# Patient Record
Sex: FEMALE | Race: WHITE | NOT HISPANIC OR LATINO | Employment: FULL TIME | URBAN - NONMETROPOLITAN AREA
[De-identification: names, ages, dates, MRNs, and addresses within clinical notes are randomized per-mention and may not be internally consistent; named-entity substitution may affect disease eponyms.]

---

## 2018-05-15 ENCOUNTER — TRANSCRIBE ORDERS (OUTPATIENT)
Dept: ADMINISTRATIVE | Facility: HOSPITAL | Age: 63
End: 2018-05-15

## 2018-05-15 DIAGNOSIS — Z12.31 ENCOUNTER FOR SCREENING MAMMOGRAM FOR MALIGNANT NEOPLASM OF BREAST: Primary | ICD-10-CM

## 2018-07-16 ENCOUNTER — OFFICE VISIT (OUTPATIENT)
Dept: GASTROENTEROLOGY | Facility: CLINIC | Age: 63
End: 2018-07-16

## 2018-07-16 ENCOUNTER — PREP FOR SURGERY (OUTPATIENT)
Dept: OTHER | Facility: HOSPITAL | Age: 63
End: 2018-07-16

## 2018-07-16 ENCOUNTER — HOSPITAL ENCOUNTER (OUTPATIENT)
Dept: MAMMOGRAPHY | Facility: HOSPITAL | Age: 63
Discharge: HOME | End: 2018-07-16
Admitting: OBSTETRICS & GYNECOLOGY

## 2018-07-16 VITALS
DIASTOLIC BLOOD PRESSURE: 88 MMHG | TEMPERATURE: 97.7 F | SYSTOLIC BLOOD PRESSURE: 139 MMHG | WEIGHT: 148 LBS | RESPIRATION RATE: 16 BRPM | BODY MASS INDEX: 27.23 KG/M2 | HEART RATE: 59 BPM | HEIGHT: 62 IN

## 2018-07-16 DIAGNOSIS — R11.0 NAUSEA: Primary | ICD-10-CM

## 2018-07-16 DIAGNOSIS — R19.7 DIARRHEA, UNSPECIFIED TYPE: Chronic | ICD-10-CM

## 2018-07-16 DIAGNOSIS — R10.13 EPIGASTRIC PAIN: ICD-10-CM

## 2018-07-16 DIAGNOSIS — K62.5 BRIGHT RED BLOOD PER RECTUM: Primary | Chronic | ICD-10-CM

## 2018-07-16 DIAGNOSIS — R10.32 LEFT LOWER QUADRANT PAIN: ICD-10-CM

## 2018-07-16 DIAGNOSIS — R10.32 LEFT LOWER QUADRANT PAIN: Chronic | ICD-10-CM

## 2018-07-16 DIAGNOSIS — R13.10 DYSPHAGIA, UNSPECIFIED TYPE: ICD-10-CM

## 2018-07-16 DIAGNOSIS — K62.5 BRIGHT RED BLOOD PER RECTUM: ICD-10-CM

## 2018-07-16 DIAGNOSIS — R19.7 DIARRHEA, UNSPECIFIED TYPE: ICD-10-CM

## 2018-07-16 DIAGNOSIS — R13.10 DYSPHAGIA, UNSPECIFIED TYPE: Chronic | ICD-10-CM

## 2018-07-16 DIAGNOSIS — Z12.31 ENCOUNTER FOR SCREENING MAMMOGRAM FOR MALIGNANT NEOPLASM OF BREAST: ICD-10-CM

## 2018-07-16 DIAGNOSIS — R12 HEARTBURN: ICD-10-CM

## 2018-07-16 DIAGNOSIS — Z12.11 COLON CANCER SCREENING: ICD-10-CM

## 2018-07-16 DIAGNOSIS — Z12.11 COLON CANCER SCREENING: Primary | ICD-10-CM

## 2018-07-16 DIAGNOSIS — R12 HEARTBURN: Chronic | ICD-10-CM

## 2018-07-16 DIAGNOSIS — R10.13 EPIGASTRIC PAIN: Chronic | ICD-10-CM

## 2018-07-16 DIAGNOSIS — R11.0 NAUSEA: Chronic | ICD-10-CM

## 2018-07-16 PROCEDURE — 99244 OFF/OP CNSLTJ NEW/EST MOD 40: CPT | Performed by: NURSE PRACTITIONER

## 2018-07-16 PROCEDURE — 77063 BREAST TOMOSYNTHESIS BI: CPT

## 2018-07-16 PROCEDURE — 77067 SCR MAMMO BI INCL CAD: CPT

## 2018-07-16 RX ORDER — OMEPRAZOLE 40 MG/1
CAPSULE, DELAYED RELEASE ORAL
Qty: 30 CAPSULE | Refills: 5 | Status: SHIPPED | OUTPATIENT
Start: 2018-07-16 | End: 2020-01-14 | Stop reason: SDUPTHER

## 2018-07-16 RX ORDER — ONDANSETRON 4 MG/1
4 TABLET, ORALLY DISINTEGRATING ORAL EVERY 8 HOURS PRN
Qty: 30 TABLET | Refills: 1 | Status: SHIPPED | OUTPATIENT
Start: 2018-07-16 | End: 2018-08-15

## 2018-07-16 RX ORDER — METRONIDAZOLE 250 MG/1
250 TABLET ORAL 4 TIMES DAILY
Qty: 28 TABLET | Refills: 0 | Status: SHIPPED | OUTPATIENT
Start: 2018-07-16 | End: 2018-07-23

## 2018-07-16 RX ORDER — OMEPRAZOLE 20 MG/1
CAPSULE, DELAYED RELEASE ORAL
Refills: 12 | COMMUNITY
Start: 2018-06-08 | End: 2018-07-16 | Stop reason: DRUGHIGH

## 2018-07-16 RX ORDER — SODIUM CHLORIDE 9 MG/ML
70 INJECTION, SOLUTION INTRAVENOUS CONTINUOUS PRN
Status: CANCELLED | OUTPATIENT
Start: 2018-07-16

## 2018-07-16 RX ORDER — CIPROFLOXACIN 500 MG/1
500 TABLET, FILM COATED ORAL 2 TIMES DAILY
Qty: 14 TABLET | Refills: 0 | Status: SHIPPED | OUTPATIENT
Start: 2018-07-16 | End: 2018-07-23

## 2018-07-16 NOTE — PATIENT INSTRUCTIONS
1. Antireflux measures: Avoid fried, fatty foods, alcohol, chocolate, coffee, tea,  soft drinks, peppermint and spearmint, spicy foods, tomatoes and tomato based foods, onion based foods, and smoking. Other antireflux measures include weight reduction if overweight, avoiding tight clothing around the abdomen, elevating the head of the bed 6 inches with blocks under the head board, and don't drink or eat before going to bed and avoid lying down immediately after meals.  2. Omeprazole 40 mg 1 po daily in the am 30 minutes before breakfast.  3. Zofran 4 mg 1 po every 8 hours as needed for nausea.  4. Upper endoscopy-EGD: Description of the procedure, risks, benefits, alternatives and options, including nonoperative options, were discussed with the patient in detail. The patient understands and wishes to proceed.  5. Treatment for diverticulitis:  A. Low-fat low fiber diet for 5 days thereafter low-fat high-fiber diet.   B. Cipro (ciprofloxacin) tablets 500 mg. Take 1 tablet by mouth twice a day for 7 days. Side effects were discussed.   C. Flagyl (metronidazole) tablets 250 mg. Take 1 tablet one by mouth 4 times a day for 7 days. Side effects were discussed.  D. Avoid laxatives, enemas for next 5 days. However, for constipation the patient may use stool softeners.  E. Patient may take probiotics while taking antibiotics, and continue for an additional 1-2 weeks.  F. Colonoscopy: Description of the procedure, risks, benefits, alternatives and options, including nonoperative options, were discussed with the patient in detail. The patient understands and wishes to proceed, although it may be advisable to wait 3-4 weeks to schedule procedure.  6. The patient is to call in 1 week with update.

## 2018-07-16 NOTE — PROGRESS NOTES
Chief Complaint   Patient presents with   • Hematochezia     There is a history of bright red blood per rectum since October 2017. The patient has rectal bleeding daily. At times it is 1-2 tsp and at times it is a very scant amount. It seems to be worsening.     There is a history of loose stools since October 2017. The patient has 1-2 loose stools per day. Nothing worsens diarrhea and nothing improves diarrhea. The patient does not take anything for diarrhea. No history of constipation.     There is a history of epigastric pain since October 2017. The patient has the pain daily. The pain is a sharp pain and has been gradually worsening. The pain lasts 5-15 minutes. Eating makes the pain worse. Nothing improves the pain.     There is a long-standing history of heartburn. The patient has heartburn 3-4 days per week. Heartburn is moderate to severe. The patient takes Omeprazole with reasonable control of heartburn.     There is a long-standing history of difficulty swallowing. The patient may have difficulty swallowing 3-4 times per month. She has difficulty swallowing solids and soft foods, but no problems with liquids.     There is a long-standing history of nausea. The patient has nausea 5-6 days per week. Eating makes nausea worse. The patient is not taking anything for nausea. The patient denies vomiting. Of interest, the patient has been having chills for the past several months off and on, but no fevers.    The patient's last colonoscopy was in 2014. There is no family history of colon cancer.    Rectal Bleeding   This is a chronic problem. Episode onset: October 2017. The problem occurs daily. The problem has been gradually worsening. Associated symptoms include abdominal pain, arthralgias and coughing. Pertinent negatives include no chest pain, chills, fatigue, fever, headaches, joint swelling, myalgias, nausea, rash or vomiting. Exacerbated by: bowel movements. She has tried nothing for the symptoms.    Diarrhea    This is a chronic problem. Episode onset: October 2017. The problem occurs less than 2 times per day. The problem has been unchanged. Diarrhea characteristics: loose. The patient states that diarrhea does not awaken her from sleep. Associated symptoms include abdominal pain, arthralgias and coughing. Pertinent negatives include no chills, fever, headaches, myalgias or vomiting. Nothing aggravates the symptoms. There are no known risk factors. She has tried nothing for the symptoms.   Abdominal Pain   This is a chronic problem. Episode onset: October 2017. The onset quality is gradual. The problem occurs daily. The problem has been gradually worsening. The pain is located in the epigastric region. The quality of the pain is sharp. Associated symptoms include arthralgias, diarrhea and hematochezia. Pertinent negatives include no constipation, dysuria, fever, headaches, hematuria, myalgias, nausea or vomiting. Her past medical history is significant for GERD.   Heartburn   She complains of abdominal pain, coughing and heartburn. She reports no chest pain or no nausea. This is a chronic problem. Episode onset: over 5 years. The problem occurs frequently. The problem has been unchanged. The heartburn duration is an hour. The heartburn is of moderate intensity. The heartburn wakes her from sleep. Nothing aggravates the symptoms. Pertinent negatives include no fatigue. There are no known risk factors. She has tried a PPI for the symptoms. The treatment provided moderate relief.   Nausea   This is a chronic problem. Episode onset: October 2017. The problem occurs intermittently. The problem has been unchanged. Associated symptoms include abdominal pain, arthralgias and coughing. Pertinent negatives include no chest pain, chills, fatigue, fever, headaches, joint swelling, myalgias, nausea, rash or vomiting. The symptoms are aggravated by eating. She has tried nothing for the symptoms.     Review of Systems    Constitutional: Negative for appetite change, chills, fatigue, fever and unexpected weight change.   HENT: Negative for mouth sores, nosebleeds and trouble swallowing.    Eyes: Negative for discharge and redness.   Respiratory: Positive for cough. Negative for apnea and shortness of breath.    Cardiovascular: Negative for chest pain, palpitations and leg swelling.   Gastrointestinal: Positive for abdominal pain, blood in stool, diarrhea, heartburn and hematochezia. Negative for abdominal distention, anal bleeding, constipation, nausea and vomiting.   Endocrine: Negative for cold intolerance, heat intolerance and polydipsia.   Genitourinary: Negative for dysuria, hematuria and urgency.   Musculoskeletal: Positive for arthralgias. Negative for joint swelling and myalgias.   Skin: Negative for rash.   Allergic/Immunologic: Negative for food allergies and immunocompromised state.   Neurological: Negative for dizziness, seizures, syncope and headaches.   Hematological: Negative for adenopathy. Does not bruise/bleed easily.   Psychiatric/Behavioral: Negative for dysphoric mood. The patient is not nervous/anxious and is not hyperactive.      Patient Active Problem List   Diagnosis   • Bright red blood per rectum   • Left lower quadrant pain   • Diarrhea   • Nausea   • Dysphagia   • Heartburn   • Epigastric pain     Past Medical History:   Diagnosis Date   • Anxiety    • Colon polyp 01/16/2014   • Depression    • Hyperlipidemia    • Osteoarthritis      Past Surgical History:   Procedure Laterality Date   • BREAST BIOPSY     • COLONOSCOPY  01/16/2014   • TUBAL ABDOMINAL LIGATION  1989   • UPPER GASTROINTESTINAL ENDOSCOPY  03/13/2014     Family History   Problem Relation Age of Onset   • Lung cancer Mother    • Lung cancer Father    • Prostate cancer Brother         x 3 brothers     Social History   Substance Use Topics   • Smoking status: Former Smoker     Types: Cigarettes     Quit date: 7/16/2016   • Smokeless tobacco:  "Never Used   • Alcohol use Yes      Comment: social       Current Outpatient Prescriptions:   •  ciprofloxacin (CIPRO) 500 MG tablet, Take 1 tablet by mouth 2 (Two) Times a Day for 7 days. Take 1 tablet twice a day x 7 days, Disp: 14 tablet, Rfl: 0  •  metroNIDAZOLE (FLAGYL) 250 MG tablet, Take 1 tablet by mouth 4 (Four) Times a Day for 7 days. Take 1 tablet four times daily x 7 days, Disp: 28 tablet, Rfl: 0  •  omeprazole (PRILOSEC) 40 MG capsule, 1 po daily in the am 30 minutes before breakfast, Disp: 30 capsule, Rfl: 5  •  ondansetron ODT (ZOFRAN-ODT) 4 MG disintegrating tablet, Take 1 tablet by mouth Every 8 (Eight) Hours As Needed for Nausea or Vomiting for up to 30 days., Disp: 30 tablet, Rfl: 1  •  Probiotic capsule, Take 1 capsule by mouth Daily., Disp: 30 capsule, Rfl: 1    No Known Allergies    /88   Pulse 59   Temp 97.7 °F (36.5 °C)   Resp 16   Ht 157.5 cm (62\")   Wt 67.1 kg (148 lb)   LMP  (LMP Unknown)   BMI 27.07 kg/m²     Physical Exam   Constitutional: She is oriented to person, place, and time. She appears well-developed and well-nourished. No distress.   HENT:   Head: Normocephalic and atraumatic.   Right Ear: Hearing and external ear normal.   Left Ear: Hearing and external ear normal.   Nose: Nose normal.   Mouth/Throat: Oropharynx is clear and moist and mucous membranes are normal. Mucous membranes are not pale, not dry and not cyanotic. No oral lesions. No oropharyngeal exudate.   Eyes: Conjunctivae and EOM are normal. Right eye exhibits no discharge. Left eye exhibits no discharge.   Neck: Trachea normal. Neck supple. No JVD present. No edema present. No thyroid mass and no thyromegaly present.   Cardiovascular: Normal rate, regular rhythm, S2 normal and normal heart sounds.  Exam reveals no gallop, no S3 and no friction rub.    No murmur heard.  Pulmonary/Chest: Effort normal and breath sounds normal. No respiratory distress. She exhibits no tenderness.   Abdominal: Normal " appearance and bowel sounds are normal. She exhibits no distension, no ascites and no mass. There is no splenomegaly or hepatomegaly. There is tenderness (mild to moderate) in the epigastric area and left lower quadrant. There is no rigidity, no rebound and no guarding. No hernia.     Vascular Status -  Her right foot exhibits no edema. Her left foot exhibits no edema.  Lymphadenopathy:     She has no cervical adenopathy.        Left: No supraclavicular adenopathy present.   Neurological: She is alert and oriented to person, place, and time. She has normal strength. No cranial nerve deficit or sensory deficit.   Skin: No rash noted. She is not diaphoretic. No cyanosis. No pallor. Nails show no clubbing.   Psychiatric: She has a normal mood and affect.   Nursing note and vitals reviewed.  Stigmata of chronic liver disease:  None.  Asterixis:  None.    Procedures:  Upon review of records:    Colonoscopy dated 1/16/2014 reveals left-sided scattered diverticulosis.  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  Colon polyps.  Internal hemorrhoids.  Hypertrophic anal papilla.  Biopsy results not available.    EGD dated 3/13/2014 reveals linear erythema within the midesophagus which may represent pill esophagitis.  Erythematous distal esophagitis, grade 1.  Small sliding hiatal hernia less than 3 centimeter.  Erythematous gastritis.  No Castlelanos's mucosa was seen.  Subtle concentric rings were seen in the mid esophagus.  Duodenum second portion biopsy reveals benign small bowel mucosa with no diagnostic abnormality.  Antrum and body biopsy reveals chronic gastritis with lymphoid aggregate.  Negative for H. pylori.  Mid esophagus biopsy reveals reactive squamous mucosa.    Assessment:      ICD-10-CM ICD-9-CM   1. Bright red blood per rectum K62.5 569.3   2. Left lower quadrant pain R10.32 789.04   3. Diarrhea, unspecified type R19.7 787.91   4. Nausea R11.0 787.02   5. Dysphagia, unspecified type R13.10  787.20   6. Heartburn R12 787.1   7. Epigastric pain R10.13 789.06   8. Colon cancer screening Z12.11 V76.51     Discussion:  1. Long-standing history of bright red blood per rectum.  Differentials include diverticulitis, hemorrhoids, fissure, vascular ectasia, underlying inflammatory bowel disease.  2. Left lower quadrant pain consistent with diverticulitis.  3. Long-standing history of loose stools.  Differentials include diverticulitis, microscopic colitis, underlying inflammatory bowel disease.  4. Long-standing history of nausea and epigastric pain.  Differentials include peptic ulcer disease, pancreatobiliary disease.  5. History of recurrent difficulty swallowing.  Differentials include Schatzki's ring, esophagitis, esophageal dysmotility.  6. Long-standing history of heartburn.  Moderate control with omeprazole.  Concerns for underlying Castellanos's.    Plan/  Patient Instructions   1. Antireflux measures: Avoid fried, fatty foods, alcohol, chocolate, coffee, tea,  soft drinks, peppermint and spearmint, spicy foods, tomatoes and tomato based foods, onion based foods, and smoking. Other antireflux measures include weight reduction if overweight, avoiding tight clothing around the abdomen, elevating the head of the bed 6 inches with blocks under the head board, and don't drink or eat before going to bed and avoid lying down immediately after meals.  2. Omeprazole 40 mg 1 po daily in the am 30 minutes before breakfast.  3. Zofran 4 mg 1 po every 8 hours as needed for nausea.  4. Upper endoscopy-EGD: Description of the procedure, risks, benefits, alternatives and options, including nonoperative options, were discussed with the patient in detail. The patient understands and wishes to proceed.  5. Treatment for diverticulitis:  A. Low-fat low fiber diet for 5 days thereafter low-fat high-fiber diet.   B. Cipro (ciprofloxacin) tablets 500 mg. Take 1 tablet by mouth twice a day for 7 days. Side effects were discussed.    C. Flagyl (metronidazole) tablets 250 mg. Take 1 tablet one by mouth 4 times a day for 7 days. Side effects were discussed.  D. Avoid laxatives, enemas for next 5 days. However, for constipation the patient may use stool softeners.  E. Patient may take probiotics while taking antibiotics, and continue for an additional 1-2 weeks.  F. Colonoscopy: Description of the procedure, risks, benefits, alternatives and options, including nonoperative options, were discussed with the patient in detail. The patient understands and wishes to proceed, although it may be advisable to wait 3-4 weeks to schedule procedure.  6. The patient is to call in 1 week with update.     Lakia Keen, APRN

## 2018-07-17 PROBLEM — Z12.11 COLON CANCER SCREENING: Status: ACTIVE | Noted: 2018-07-17

## 2019-05-23 DIAGNOSIS — R12 HEARTBURN: Chronic | ICD-10-CM

## 2019-05-23 DIAGNOSIS — R10.13 EPIGASTRIC PAIN: Chronic | ICD-10-CM

## 2019-05-23 RX ORDER — OMEPRAZOLE 40 MG/1
CAPSULE, DELAYED RELEASE ORAL
Qty: 30 CAPSULE | Refills: 4 | OUTPATIENT
Start: 2019-05-23

## 2019-09-26 DIAGNOSIS — R12 HEARTBURN: Chronic | ICD-10-CM

## 2019-09-26 DIAGNOSIS — R10.13 EPIGASTRIC PAIN: Chronic | ICD-10-CM

## 2019-09-26 RX ORDER — OMEPRAZOLE 40 MG/1
CAPSULE, DELAYED RELEASE ORAL
Qty: 30 CAPSULE | Refills: 4 | OUTPATIENT
Start: 2019-09-26

## 2020-01-14 ENCOUNTER — OFFICE VISIT (OUTPATIENT)
Dept: INTERNAL MEDICINE | Facility: CLINIC | Age: 65
End: 2020-01-14

## 2020-01-14 VITALS
HEART RATE: 75 BPM | WEIGHT: 165 LBS | OXYGEN SATURATION: 97 % | HEIGHT: 62 IN | BODY MASS INDEX: 30.36 KG/M2 | TEMPERATURE: 97.6 F | RESPIRATION RATE: 16 BRPM | SYSTOLIC BLOOD PRESSURE: 118 MMHG | DIASTOLIC BLOOD PRESSURE: 84 MMHG

## 2020-01-14 DIAGNOSIS — F41.9 ANXIETY: ICD-10-CM

## 2020-01-14 DIAGNOSIS — Z12.11 COLON CANCER SCREENING: ICD-10-CM

## 2020-01-14 DIAGNOSIS — M19.90 ARTHRITIS: ICD-10-CM

## 2020-01-14 DIAGNOSIS — E78.5 HYPERLIPIDEMIA, UNSPECIFIED HYPERLIPIDEMIA TYPE: ICD-10-CM

## 2020-01-14 DIAGNOSIS — R11.0 NAUSEA: ICD-10-CM

## 2020-01-14 DIAGNOSIS — R10.32 LEFT LOWER QUADRANT PAIN: ICD-10-CM

## 2020-01-14 DIAGNOSIS — F32.A DEPRESSION, UNSPECIFIED DEPRESSION TYPE: ICD-10-CM

## 2020-01-14 DIAGNOSIS — R19.7 DIARRHEA, UNSPECIFIED TYPE: ICD-10-CM

## 2020-01-14 DIAGNOSIS — E55.9 VITAMIN D DEFICIENCY, UNSPECIFIED: ICD-10-CM

## 2020-01-14 DIAGNOSIS — Z83.2 FAMILY HISTORY OF ANEMIA: ICD-10-CM

## 2020-01-14 DIAGNOSIS — K62.5 BRIGHT RED BLOOD PER RECTUM: Primary | ICD-10-CM

## 2020-01-14 DIAGNOSIS — R10.13 EPIGASTRIC PAIN: Chronic | ICD-10-CM

## 2020-01-14 DIAGNOSIS — R12 HEARTBURN: ICD-10-CM

## 2020-01-14 DIAGNOSIS — R32 URINARY INCONTINENCE, UNSPECIFIED TYPE: ICD-10-CM

## 2020-01-14 PROBLEM — R13.10 DYSPHAGIA: Status: RESOLVED | Noted: 2018-07-16 | Resolved: 2020-01-14

## 2020-01-14 PROCEDURE — 99214 OFFICE O/P EST MOD 30 MIN: CPT | Performed by: INTERNAL MEDICINE

## 2020-01-14 RX ORDER — OMEPRAZOLE 40 MG/1
CAPSULE, DELAYED RELEASE ORAL
Qty: 30 CAPSULE | Refills: 5 | Status: SHIPPED | OUTPATIENT
Start: 2020-01-14 | End: 2020-03-16 | Stop reason: DRUGHIGH

## 2020-01-14 NOTE — PROGRESS NOTES
Subjective   Krystina Henley is a 64 y.o. female.     Chief Complaint   Patient presents with   • Hiatal Hernia     pt states she had a colonoscopy 5 years ago by Dr. Abdi, pt states she had a lot of rectal bleeding and still does have rectal bleeding. Dr. Abdi also found her hiatal hernia.     • Establish Care       History of Present Illness   Patient here for follow-up.  Patient complains lot of GI symptoms including nausea heartburn epigastric area discomfort diarrhea bright red blood per rectum.  Patient had a history of upper endoscopy many years ago diagnosed a hiatal hernia.  Patient states eating make it worse lying down make it worse medication does not help a great deal.  Patient also has some anxiety depression stable now.  Hyperlipidemia history and anemia history.  Patient also complains urinary incontinence.  Patient also has a history of a low vitamin D.      Current Outpatient Medications:   •  omeprazole (PrilOSEC) 40 MG capsule, 1 po daily in the am 30 minutes before breakfast, Disp: 30 capsule, Rfl: 5  •  Probiotic capsule, Take 1 capsule by mouth Daily., Disp: 30 capsule, Rfl: 1    The following portions of the patient's history were reviewed and updated as appropriate: allergies, current medications, past family history, past medical history, past social history, past surgical history and problem list.    Review of Systems   Constitutional: Negative.    Respiratory: Negative.    Cardiovascular: Negative.    Gastrointestinal: Positive for abdominal pain.   Musculoskeletal: Negative.    Skin: Negative.    Neurological: Negative.    Psychiatric/Behavioral: Negative.        Objective   Physical Exam   Constitutional: She is oriented to person, place, and time. She appears well-developed and well-nourished.   HENT:   Head: Normocephalic and atraumatic.   Neck: Neck supple.   Cardiovascular: Normal rate, regular rhythm and normal heart sounds.   Pulmonary/Chest: Effort normal and breath sounds  normal.   Abdominal: Bowel sounds are normal.   Neurological: She is alert and oriented to person, place, and time.   Skin: Skin is warm.   Psychiatric: She has a normal mood and affect.       All tests have been reviewed.    Assessment/Plan   Diagnoses and all orders for this visit:    Bright red blood per rectum  -     Ambulatory Referral to Gastroenterology    Diarrhea, unspecified type  -     Ambulatory Referral to Gastroenterology    Nausea  -     Ambulatory Referral to Gastroenterology    Heartburn  -     Ambulatory Referral to Gastroenterology  -     omeprazole (PrilOSEC) 40 MG capsule; 1 po daily in the am 30 minutes before breakfast    Epigastric pain  -     Ambulatory Referral to Gastroenterology  -     CBC & Differential  -     Comprehensive Metabolic Panel  -     Lipid Panel  -     TSH  -     Vitamin D 25 Hydroxy  -     omeprazole (PrilOSEC) 40 MG capsule; 1 po daily in the am 30 minutes before breakfast      Colon cancer screening  -     Ambulatory Referral to Gastroenterology    Arthritis stable now    Anxiety continue to watch    Depression, no suicidal thoughts continue to watch.  Stable now    Hyperlipidemia, unspecified hyperlipidemia type 2 blood tests  -     CBC & Differential  -     Comprehensive Metabolic Panel  -     Lipid Panel  -     TSH  -     Vitamin D 25 Hydroxy    history of anemia due to blood tests    Urinary incontinence, unspecified type  -     Ambulatory Referral to Urology    Vitamin D deficiency, unspecified   -     Vitamin D 25 Hydroxy      3 weeks after labs

## 2020-01-30 LAB
25(OH)D3+25(OH)D2 SERPL-MCNC: 32.7 NG/ML (ref 30–100)
ALBUMIN SERPL-MCNC: 4 G/DL (ref 3.5–5.2)
ALBUMIN/GLOB SERPL: 1.5 G/DL
ALP SERPL-CCNC: 85 U/L (ref 39–117)
ALT SERPL-CCNC: 20 U/L (ref 1–33)
AST SERPL-CCNC: 24 U/L (ref 1–32)
BASOPHILS # BLD AUTO: 0.05 10*3/MM3 (ref 0–0.2)
BASOPHILS NFR BLD AUTO: 1 % (ref 0–1.5)
BILIRUB SERPL-MCNC: <0.2 MG/DL (ref 0.2–1.2)
BUN SERPL-MCNC: 18 MG/DL (ref 8–23)
BUN/CREAT SERPL: 19.6 (ref 7–25)
CALCIUM SERPL-MCNC: 8.7 MG/DL (ref 8.6–10.5)
CHLORIDE SERPL-SCNC: 105 MMOL/L (ref 98–107)
CHOLEST SERPL-MCNC: 294 MG/DL (ref 0–200)
CO2 SERPL-SCNC: 26.9 MMOL/L (ref 22–29)
CREAT SERPL-MCNC: 0.92 MG/DL (ref 0.57–1)
EOSINOPHIL # BLD AUTO: 0.29 10*3/MM3 (ref 0–0.4)
EOSINOPHIL NFR BLD AUTO: 5.7 % (ref 0.3–6.2)
ERYTHROCYTE [DISTWIDTH] IN BLOOD BY AUTOMATED COUNT: 13.4 % (ref 12.3–15.4)
GLOBULIN SER CALC-MCNC: 2.6 GM/DL
GLUCOSE SERPL-MCNC: 85 MG/DL (ref 65–99)
HCT VFR BLD AUTO: 36.4 % (ref 34–46.6)
HDLC SERPL-MCNC: 71 MG/DL (ref 40–60)
HGB BLD-MCNC: 12 G/DL (ref 12–15.9)
IMM GRANULOCYTES # BLD AUTO: 0.01 10*3/MM3 (ref 0–0.05)
IMM GRANULOCYTES NFR BLD AUTO: 0.2 % (ref 0–0.5)
LDLC SERPL CALC-MCNC: 199 MG/DL (ref 0–100)
LYMPHOCYTES # BLD AUTO: 2.15 10*3/MM3 (ref 0.7–3.1)
LYMPHOCYTES NFR BLD AUTO: 42.3 % (ref 19.6–45.3)
MCH RBC QN AUTO: 28.9 PG (ref 26.6–33)
MCHC RBC AUTO-ENTMCNC: 33 G/DL (ref 31.5–35.7)
MCV RBC AUTO: 87.7 FL (ref 79–97)
MONOCYTES # BLD AUTO: 0.51 10*3/MM3 (ref 0.1–0.9)
MONOCYTES NFR BLD AUTO: 10 % (ref 5–12)
NEUTROPHILS # BLD AUTO: 2.07 10*3/MM3 (ref 1.7–7)
NEUTROPHILS NFR BLD AUTO: 40.8 % (ref 42.7–76)
NRBC BLD AUTO-RTO: 0 /100 WBC (ref 0–0.2)
PLATELET # BLD AUTO: 292 10*3/MM3 (ref 140–450)
POTASSIUM SERPL-SCNC: 4.8 MMOL/L (ref 3.5–5.2)
PROT SERPL-MCNC: 6.6 G/DL (ref 6–8.5)
RBC # BLD AUTO: 4.15 10*6/MM3 (ref 3.77–5.28)
SODIUM SERPL-SCNC: 144 MMOL/L (ref 136–145)
TRIGL SERPL-MCNC: 122 MG/DL (ref 0–150)
TSH SERPL DL<=0.005 MIU/L-ACNC: 4.66 UIU/ML (ref 0.27–4.2)
VLDLC SERPL CALC-MCNC: 24.4 MG/DL
WBC # BLD AUTO: 5.08 10*3/MM3 (ref 3.4–10.8)

## 2020-02-03 ENCOUNTER — OFFICE VISIT (OUTPATIENT)
Dept: UROLOGY | Facility: CLINIC | Age: 65
End: 2020-02-03

## 2020-02-03 VITALS — HEART RATE: 68 BPM | HEIGHT: 62 IN | BODY MASS INDEX: 30.36 KG/M2 | OXYGEN SATURATION: 99 % | WEIGHT: 165 LBS

## 2020-02-03 DIAGNOSIS — R32 URINARY INCONTINENCE, UNSPECIFIED TYPE: Primary | ICD-10-CM

## 2020-02-03 LAB
BILIRUB BLD-MCNC: NEGATIVE MG/DL
CLARITY, POC: CLEAR
COLOR UR: YELLOW
GLUCOSE UR STRIP-MCNC: NEGATIVE MG/DL
KETONES UR QL: NEGATIVE
LEUKOCYTE EST, POC: NEGATIVE
NITRITE UR-MCNC: NEGATIVE MG/ML
PH UR: 7.5 [PH] (ref 5–8)
PROT UR STRIP-MCNC: NEGATIVE MG/DL
RBC # UR STRIP: NEGATIVE /UL
SP GR UR: 1.01 (ref 1–1.03)
UROBILINOGEN UR QL: NORMAL

## 2020-02-03 PROCEDURE — 81003 URINALYSIS AUTO W/O SCOPE: CPT | Performed by: UROLOGY

## 2020-02-03 PROCEDURE — 99204 OFFICE O/P NEW MOD 45 MIN: CPT | Performed by: UROLOGY

## 2020-02-03 NOTE — PROGRESS NOTES
Chief Complaint  Mixed urinary incontinence    Referring Provider  Tre Lindquist MD    HPI  Ms. Henley is a 64 y.o. female presents with complaint of urinary incontinence for 3-4 years.     Pt has primarily urge  mixed urinary incontinence.     Severity: Pt uses 4-5 pads a day and has tried oxybutynin, Detrol, pelvic floor physical therapy, lifestyle changes and weight loss in the past  Associated Sx: Pt has + h/o daytime frequency, urgency and nocturia.     No h/o poor stream, straining, hesitancy or incomplete voiding.     No h/o recurrent UTI, hematuria, nephrolithiasis    No vaginal discharge or bleeding.  No h/o something coming out of vagina   No constipation  3 vaginal deliveries    Past Medical History  Past Medical History:   Diagnosis Date   • Anxiety    • Colon polyp 01/16/2014   • Depression    • Diverticulosis    • Hyperlipidemia    • Osteoarthritis        Past Surgical History  Past Surgical History:   Procedure Laterality Date   • BREAST BIOPSY     • COLONOSCOPY  01/16/2014   • UPPER GASTROINTESTINAL ENDOSCOPY  03/13/2014       Medications  Current Outpatient Medications   Medication Sig Dispense Refill   • omeprazole (PrilOSEC) 40 MG capsule 1 po daily in the am 30 minutes before breakfast 30 capsule 5   • Probiotic capsule Take 1 capsule by mouth Daily. 30 capsule 1     No current facility-administered medications for this visit.        Allergies  No Known Allergies    Social History  Social History     Socioeconomic History   • Marital status:      Spouse name: Not on file   • Number of children: Not on file   • Years of education: Not on file   • Highest education level: Not on file   Tobacco Use   • Smoking status: Former Smoker     Types: Cigarettes     Last attempt to quit: 7/16/2016     Years since quitting: 3.5   • Smokeless tobacco: Never Used   Substance and Sexual Activity   • Alcohol use: Yes     Comment: social   • Drug use: No   • Sexual activity: Defer       Family  "History  Family History   Problem Relation Age of Onset   • Lung cancer Mother    • Lung cancer Father    • Prostate cancer Brother         x 3 brothers   • Heart attack Brother         x 2 brothers        Review of Systems  Constitutional: No fevers or chills  Skin: Negative for rash  Endocrine: No heat/cold intolerance   Cardiovascular: Negative for chest pain or dyspnea on exertion  Respiratory: Negative for shortness of breath or wheezing  Gastrointestinal: No nausea or vomiting  Genitourinary: Negative for current gross hematuria.  Musculoskeletal: No flank pain  Neurological:  Negative for frequent headaches or dizziness  Lymph/Heme: Negative for leg swelling or calf pain.    Physical Exam  Visit Vitals  Pulse 68   Ht 157.5 cm (62\")   Wt 74.8 kg (165 lb)   LMP  (LMP Unknown)   SpO2 99%   BMI 30.18 kg/m²     Constitutional: NAD, WDWN  HEENT: NCAT. Conjunctivae normal  MMM  Cardiovascular: Regular rate  Pulmonary/Chest: Respirations are even and non-labored bilaterally  Abdominal: Soft with no distension, tenderness, masses, guarding or CVA tenderness  Neurological: A + O x 3,  cranial nerves II-XII grossly intact  Extremities: LIBBY x 4, warm, no clubbing, no cyanosis  Skin: Pink, warm, dry with no rash  Psychiatric:  Normal mood and affect    Pelvic exam: Fairly healthy vaginal estrogenization, severe stress urinary incontinence; grade 2-3 cystocele, grade 1-2 rectocele, no apical prolapse    Labs  Brief Urine Lab Results  (Last result in the past 365 days)      Color   Clarity   Blood   Leuk Est   Nitrite   Protein   CREAT   Urine HCG        02/03/20 1021 Yellow Clear Negative Negative Negative Negative               Lab Results   Component Value Date    CALCIUM 8.7 01/30/2020     01/30/2020    K 4.8 01/30/2020    CO2 26.9 01/30/2020     01/30/2020    BUN 18 01/30/2020    CREATININE 0.92 01/30/2020    EGFRIFAFRI 74 01/30/2020    EGFRIFNONA 61 01/30/2020    BCR 19.6 01/30/2020       Lab Results "   Component Value Date    WBC 5.08 01/30/2020    HGB 12.0 01/30/2020    HCT 36.4 01/30/2020    MCV 87.7 01/30/2020     01/30/2020       PVR   Post-void residual performed by staff - 0    I have personally reviewed her labs and post void residual imaging.     Assessment  Ms. Henley is a 64 y.o. female with mixed urinary incontinence, stress predominant, though the urge component is still very bothersome.  She had a significant amount of stress incontinence on exam today, as well as grade 2-3 anterior prolapse and 1-2 rectocele.  [place] was still well suspended.    We discussed the AUA guidelines for urge urinary incontinence.  We discussed lifestyle changes such as weight reduction and caffeine reduction, as well as medications such as anticholinergics and beta agonist.  We discussed third line therapies, such as Botox, InterStim, and PTNS.  The patient is interested in 3rd line therapies.  We discussed the risks, benefits, and alternatives to this approach.  She voiced her understanding and wished to proceed.    We discussed the AUA guidelines for management of stress urinary incontinence.  We discussed served of management with lifestyle changes like weight reduction and pelvic floor physical therapy.  We discussed surgical management with either mid urethral sling versus cystoscopy with bulking agent injection.  The patient is very interested in mid urethral sling.  We discussed the risks, benefits, and alternatives.  The patient voiced her understanding and wished to proceed.    I will have her see Dr. Estes and get his opinion on her degree of prolapse.  If he agrees that is overall minimal, we will proceed with mid urethral sling, plus or minus anterior repair.  If he feels that she is at risk or has significant apical prolapse, we will refer her to someone who can repair that, and she would get the sling along with that at that time.  We will defer treating her urge incontinence for now, because in  my experience 30% of women have great reduction or resolution of the urge incontinence after there severe stress incontinence is treated.    Plan  1.  FU in 2-3 weeks

## 2020-02-05 ENCOUNTER — OFFICE VISIT (OUTPATIENT)
Dept: GASTROENTEROLOGY | Facility: CLINIC | Age: 65
End: 2020-02-05

## 2020-02-05 VITALS
BODY MASS INDEX: 31.1 KG/M2 | RESPIRATION RATE: 16 BRPM | TEMPERATURE: 96.8 F | SYSTOLIC BLOOD PRESSURE: 123 MMHG | HEART RATE: 72 BPM | HEIGHT: 62 IN | WEIGHT: 169 LBS | DIASTOLIC BLOOD PRESSURE: 82 MMHG

## 2020-02-05 DIAGNOSIS — R13.10 DYSPHAGIA, UNSPECIFIED TYPE: ICD-10-CM

## 2020-02-05 DIAGNOSIS — K62.5 BRBPR (BRIGHT RED BLOOD PER RECTUM): ICD-10-CM

## 2020-02-05 DIAGNOSIS — R10.13 EPIGASTRIC PAIN: Primary | ICD-10-CM

## 2020-02-05 DIAGNOSIS — R19.7 DIARRHEA, UNSPECIFIED TYPE: ICD-10-CM

## 2020-02-05 DIAGNOSIS — R12 HEARTBURN: ICD-10-CM

## 2020-02-05 PROCEDURE — 99214 OFFICE O/P EST MOD 30 MIN: CPT | Performed by: INTERNAL MEDICINE

## 2020-02-05 NOTE — H&P (VIEW-ONLY)
Chief Complaint   Patient presents with   • Abdominal Pain     History of Present Illness     There is history of epigastric abdominal pain off and on for the last 6 months.  The pain is gradual in onset, moderate in severity and aching in character.  Frequency being 2-3 times a week.  The pain may last for several minutes.  There is no radiation of abdominal pain.  Eating worsens the abdominal pain.  No definite relieving factors of abdominal pain.  The patient has history of reflux which is getting  worse especially when she bends over.  The patient has significant regurgitation. For the last several months the patient feels some solid food dysphagia.  The patient points to words mid to lower retrosternal area.  Frequency being several times a week.  The patient denies associated weight loss. The patient has history of diarrhea off-and-on for the last months months.  Severity is moderate, frequency of bowel movements being 3-5 times a day.  The stools are described as loose and at times watery.  Occasionally, there is a nocturnal element of diarrhea. The diarrhea is associated with tenesmus at times.  There is history of bright red blood per rectum off and on for the last several months.  Frequency being several times a month.  There is no associated dizziness.  She denies anorectal pain.  There is no history of liver or pancreatic disease.    There is no family history of colon cancer, inflammatory bowel disease, celiac disease or chronic liver disease.    Review of Systems   Constitutional: Positive for fatigue. Negative for appetite change, chills, fever and unexpected weight change.   HENT: Positive for hearing loss and trouble swallowing. Negative for mouth sores and nosebleeds.    Eyes: Negative for discharge and redness.   Respiratory: Negative for apnea, cough and shortness of breath.    Cardiovascular: Negative for chest pain, palpitations and leg swelling.   Gastrointestinal: Positive for blood in stool,  diarrhea, nausea and vomiting. Negative for abdominal distention, abdominal pain, anal bleeding and constipation.   Endocrine: Negative for cold intolerance, heat intolerance and polydipsia.   Genitourinary: Negative for dysuria, hematuria and urgency.   Musculoskeletal: Positive for arthralgias and back pain. Negative for joint swelling and myalgias.   Skin: Negative for rash.   Allergic/Immunologic: Positive for environmental allergies. Negative for food allergies and immunocompromised state.   Neurological: Negative for dizziness, seizures, syncope and headaches.   Hematological: Negative for adenopathy. Does not bruise/bleed easily.   Psychiatric/Behavioral: Negative for dysphoric mood. The patient is nervous/anxious. The patient is not hyperactive.      Patient Active Problem List   Diagnosis   • Bright red blood per rectum   • Diarrhea   • Nausea   • Heartburn   • Epigastric pain   • Colon cancer screening   • Arthritis   • Anxiety   • Depression   • Hyperlipidemia   • Family history of anemia   • Urinary incontinence   • Vitamin D deficiency, unspecified      Past Medical History:   Diagnosis Date   • Anxiety    • Colon polyp 01/16/2014   • Depression    • Diverticulosis    • Hyperlipidemia    • Osteoarthritis      Past Surgical History:   Procedure Laterality Date   • BREAST BIOPSY     • COLONOSCOPY  01/16/2014   • UPPER GASTROINTESTINAL ENDOSCOPY  03/13/2014     Family History   Problem Relation Age of Onset   • Lung cancer Mother    • Lung cancer Father    • Prostate cancer Brother         x 3 brothers   • Heart attack Brother         x 2 brothers    • Colon cancer Neg Hx    • Cirrhosis Neg Hx    • Liver disease Neg Hx    • Liver cancer Neg Hx    • Crohn's disease Neg Hx      Social History     Tobacco Use   • Smoking status: Former Smoker     Types: Cigarettes     Last attempt to quit: 7/16/2016     Years since quitting: 3.5   • Smokeless tobacco: Never Used   Substance Use Topics   • Alcohol use: Yes      "Comment: social       Current Outpatient Medications:   •  omeprazole (PrilOSEC) 40 MG capsule, 1 po daily in the am 30 minutes before breakfast, Disp: 30 capsule, Rfl: 5    No Known Allergies    Blood pressure 123/82, pulse 72, temperature 96.8 °F (36 °C), resp. rate 16, height 157.5 cm (62\"), weight 76.7 kg (169 lb).    Physical Exam   Constitutional: She is oriented to person, place, and time. She appears well-developed and well-nourished. No distress.   HENT:   Head: Normocephalic and atraumatic.   Right Ear: Hearing and external ear normal.   Left Ear: Hearing and external ear normal.   Nose: Nose normal.   Mouth/Throat: Oropharynx is clear and moist and mucous membranes are normal. Mucous membranes are not pale, not dry and not cyanotic. No oral lesions. No oropharyngeal exudate.   Eyes: Conjunctivae and EOM are normal. Right eye exhibits no discharge. Left eye exhibits no discharge. No scleral icterus.   Neck: Trachea normal. Neck supple. No JVD present. No edema present. No thyroid mass and no thyromegaly present.   Cardiovascular: Normal rate, regular rhythm, S2 normal and normal heart sounds. Exam reveals no gallop, no S3 and no friction rub.   No murmur heard.  Pulmonary/Chest: Effort normal and breath sounds normal. No respiratory distress. She has no wheezes. She has no rales. She exhibits no tenderness.   Abdominal: Soft. Normal appearance and bowel sounds are normal. She exhibits no distension, no ascites and no mass. There is no splenomegaly or hepatomegaly. There is tenderness in the epigastric area. There is no rigidity, no rebound and no guarding. No hernia.   Musculoskeletal: She exhibits no tenderness or deformity.     Vascular Status -  Her right foot exhibits no edema. Her left foot exhibits no edema.  Lymphadenopathy:     She has no cervical adenopathy.        Left: No supraclavicular adenopathy present.   Neurological: She is alert and oriented to person, place, and time. She has normal " strength. No cranial nerve deficit or sensory deficit. She exhibits normal muscle tone. Coordination normal.   Skin: No rash noted. She is not diaphoretic. No cyanosis. No pallor. Nails show no clubbing.   Psychiatric: She has a normal mood and affect. Her behavior is normal. Judgment and thought content normal.   Nursing note and vitals reviewed.  Stigmata of chronic liver disease:  None.  Asterixis:  None.    Laboratory Testing:  Upon review of medical records:    Dated January 30, 2020 sodium 144 potassium 4.8 chloride 105 CO2 26.9 BUN 18 serum creatinine 0.92 glucose 85.  Calcium 8.7.  Total protein 6.6.  Albumin 4.00.  T bili <0.2 AST 24 ALT 20 alkaline phosphatase 85.  Total cholesterol 294.  Triglycerides 122.  TSH 4.660.  Vitamin D 25 hydroxy level 32.7.  WBC 5.08 hemoglobin 12.0 hematocrit 36.4 MCV 87.7 platelet count 292.    Procedures:  Upon review of medical records:     Colonoscopy dated 1/16/2014 reveals left-sided scattered diverticulosis.  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  Colon polyps.  Internal hemorrhoids.  Hypertrophic anal papilla.  Biopsy results not available.     EGD dated 3/13/2014 reveals linear erythema within the midesophagus which may represent pill esophagitis. Erythematous distal esophagitis, grade 1.  Small sliding hiatal hernia less than 3 centimeter.  Erythematous gastritis.  No Castellanos's mucosa was seen.  Subtle concentric rings were seen in the mid esophagus.  Duodenum second portion biopsy reveals benign small bowel mucosa with no diagnostic abnormality.  Antrum and body biopsy reveals chronic gastritis with lymphoid aggregate.  Negative for H. pylori.  Mid esophagus biopsy reveals reactive squamous mucosa.     Assessment:      ICD-10-CM ICD-9-CM   1. Epigastric pain R10.13 789.06   2. Heartburn R12 787.1   3. Dysphagia, unspecified type R13.10 787.20   4. Diarrhea, unspecified type R19.7 787.91   5. BRBPR (bright red blood per rectum) K62.5 569.3        Discussion:  1.     Plan/  Patient Instructions   1. Antireflux measures.  2. Prilosec(Omeprazole) DR capsule 40 mg. Take one capsule in the morning half an hour before eating daily.  3. Low-fat-low lactose diet.  4. Upper endoscopy (EGD) counseling:  Description of the procedure, risks, benefits, alternatives and options including nonoperative options were discussed with the patient in detail.  The patient understands and wishes to proceed.  5. The patient will need a colonoscopy in the future.       Marito Abdi MD

## 2020-02-05 NOTE — PATIENT INSTRUCTIONS
1. Antireflux measures.  2. Prilosec(Omeprazole) DR capsule 40 mg. Take one capsule in the morning half an hour before eating daily.  3. Low-fat-low lactose diet.  4. Upper endoscopy (EGD) counseling:  Description of the procedure, risks, benefits, alternatives and options including nonoperative options were discussed with the patient in detail.  The patient understands and wishes to proceed.  5. The patient will need a colonoscopy in the future.

## 2020-02-06 ENCOUNTER — PREP FOR SURGERY (OUTPATIENT)
Dept: OTHER | Facility: HOSPITAL | Age: 65
End: 2020-02-06

## 2020-02-06 DIAGNOSIS — R12 HEARTBURN: Primary | ICD-10-CM

## 2020-02-06 DIAGNOSIS — R10.13 EPIGASTRIC PAIN: ICD-10-CM

## 2020-02-06 DIAGNOSIS — R13.10 DYSPHAGIA, UNSPECIFIED TYPE: ICD-10-CM

## 2020-02-06 RX ORDER — SODIUM CHLORIDE 9 MG/ML
70 INJECTION, SOLUTION INTRAVENOUS CONTINUOUS PRN
Status: CANCELLED | OUTPATIENT
Start: 2020-02-19

## 2020-02-07 ENCOUNTER — OFFICE VISIT (OUTPATIENT)
Dept: INTERNAL MEDICINE | Facility: CLINIC | Age: 65
End: 2020-02-07

## 2020-02-07 VITALS
BODY MASS INDEX: 31.1 KG/M2 | TEMPERATURE: 98.2 F | DIASTOLIC BLOOD PRESSURE: 84 MMHG | SYSTOLIC BLOOD PRESSURE: 132 MMHG | HEART RATE: 72 BPM | OXYGEN SATURATION: 98 % | HEIGHT: 62 IN | WEIGHT: 169 LBS

## 2020-02-07 DIAGNOSIS — R19.7 DIARRHEA, UNSPECIFIED TYPE: ICD-10-CM

## 2020-02-07 DIAGNOSIS — K21.9 GASTROESOPHAGEAL REFLUX DISEASE WITHOUT ESOPHAGITIS: ICD-10-CM

## 2020-02-07 DIAGNOSIS — E78.5 HYPERLIPIDEMIA, UNSPECIFIED HYPERLIPIDEMIA TYPE: Primary | ICD-10-CM

## 2020-02-07 DIAGNOSIS — R10.13 EPIGASTRIC PAIN: Chronic | ICD-10-CM

## 2020-02-07 DIAGNOSIS — R94.6 ABNORMAL RESULTS OF THYROID FUNCTION STUDIES: ICD-10-CM

## 2020-02-07 DIAGNOSIS — E55.9 VITAMIN D DEFICIENCY, UNSPECIFIED: ICD-10-CM

## 2020-02-07 DIAGNOSIS — R79.89 TSH ELEVATION: ICD-10-CM

## 2020-02-07 DIAGNOSIS — F41.9 ANXIETY: ICD-10-CM

## 2020-02-07 DIAGNOSIS — R12 HEARTBURN: ICD-10-CM

## 2020-02-07 DIAGNOSIS — R32 URINARY INCONTINENCE, UNSPECIFIED TYPE: ICD-10-CM

## 2020-02-07 DIAGNOSIS — M19.90 ARTHRITIS: ICD-10-CM

## 2020-02-07 DIAGNOSIS — F32.A DEPRESSION, UNSPECIFIED DEPRESSION TYPE: ICD-10-CM

## 2020-02-07 DIAGNOSIS — K62.5 BRIGHT RED BLOOD PER RECTUM: ICD-10-CM

## 2020-02-07 DIAGNOSIS — Z12.11 COLON CANCER SCREENING: ICD-10-CM

## 2020-02-07 PROBLEM — Z83.2 FAMILY HISTORY OF ANEMIA: Status: RESOLVED | Noted: 2020-01-14 | Resolved: 2020-02-07

## 2020-02-07 PROBLEM — R13.10 DYSPHAGIA: Status: ACTIVE | Noted: 2020-02-07

## 2020-02-07 PROBLEM — R11.0 NAUSEA: Status: RESOLVED | Noted: 2018-07-16 | Resolved: 2020-02-07

## 2020-02-07 PROCEDURE — 99214 OFFICE O/P EST MOD 30 MIN: CPT | Performed by: INTERNAL MEDICINE

## 2020-02-07 RX ORDER — ROSUVASTATIN CALCIUM 10 MG/1
10 TABLET, COATED ORAL DAILY
Qty: 30 TABLET | Refills: 1 | Status: SHIPPED | OUTPATIENT
Start: 2020-02-07 | End: 2020-04-17

## 2020-02-07 NOTE — PROGRESS NOTES
Subjective   Krystina Henley is a 64 y.o. female.     Chief Complaint   Patient presents with   • Follow-up     3 wk f/u labs        History of Present Illness   Patient here for follow-up.  Cholesterol is very high .  History of failed Lipitor.  GERD still has heartburn and a loose stool blood in stool and epigastric pain patient is going to see gastroenterologist for scopes.  Vitamin D normal.  Anxiety depression normal.  Urine incontinence patient going to see urologist.    Current Outpatient Medications:   •  omeprazole (PrilOSEC) 40 MG capsule, 1 po daily in the am 30 minutes before breakfast, Disp: 30 capsule, Rfl: 5  •  rosuvastatin (CRESTOR) 10 MG tablet, Take 1 tablet by mouth Daily., Disp: 30 tablet, Rfl: 1    The following portions of the patient's history were reviewed and updated as appropriate: allergies, current medications, past family history, past medical history, past social history, past surgical history and problem list.    Review of Systems   Constitutional: Negative.    Respiratory: Negative.    Cardiovascular: Negative.    Gastrointestinal: Positive for abdominal pain.   Musculoskeletal: Negative.    Skin: Negative.    Neurological: Negative.    Psychiatric/Behavioral: Negative.        Objective   Physical Exam   Constitutional: She is oriented to person, place, and time. She appears well-nourished.   Neck: Neck supple.   Cardiovascular: Normal rate, regular rhythm and normal heart sounds.   Pulmonary/Chest: Effort normal and breath sounds normal.   Abdominal: Bowel sounds are normal. There is tenderness.   Neurological: She is alert and oriented to person, place, and time.   Skin: Skin is warm.   Psychiatric: She has a normal mood and affect.       All tests have been reviewed.    Assessment/Plan   Diagnoses and all orders for this visit:    Hyperlipidemia, unspecified hyperlipidemia type initiate Crestor 10 mg daily if muscle pain we will cut down to 5 mg daily or even every  other day patient is going to do titrate.  -     CK  -     Comprehensive Metabolic Panel  -     Lipid Panel    Gastroesophageal reflux disease without esophagitis continue medication    Vitamin D deficiency, unspecified normal    Anxiety stable    Depression, unspecified depression type stable    Heartburn pending scope    Diarrhea, unspecified type scope    Bright red blood per rectum scope    Epigastric pain  -     US Gallbladder    Arthritis    Urinary incontinence, unspecified type urology    Colon cancer screening scope    Other orders  -     rosuvastatin (CRESTOR) 10 MG tablet; Take 1 tablet by mouth Daily.      6 weeks after blood test

## 2020-02-13 ENCOUNTER — OFFICE VISIT (OUTPATIENT)
Dept: OBSTETRICS AND GYNECOLOGY | Facility: CLINIC | Age: 65
End: 2020-02-13

## 2020-02-13 VITALS
BODY MASS INDEX: 30.18 KG/M2 | DIASTOLIC BLOOD PRESSURE: 78 MMHG | SYSTOLIC BLOOD PRESSURE: 122 MMHG | HEIGHT: 62 IN | WEIGHT: 164 LBS

## 2020-02-13 DIAGNOSIS — N81.10 CYSTOCELE WITH RECTOCELE: ICD-10-CM

## 2020-02-13 DIAGNOSIS — N81.2 UTEROVAGINAL PROLAPSE, INCOMPLETE: Primary | ICD-10-CM

## 2020-02-13 DIAGNOSIS — N81.6 CYSTOCELE WITH RECTOCELE: ICD-10-CM

## 2020-02-13 DIAGNOSIS — N39.3 STRESS INCONTINENCE IN FEMALE: ICD-10-CM

## 2020-02-13 PROCEDURE — 99204 OFFICE O/P NEW MOD 45 MIN: CPT | Performed by: OBSTETRICS & GYNECOLOGY

## 2020-02-14 NOTE — PROGRESS NOTES
Subjective   Chief Complaint   Patient presents with   • Urinary Incontinence     Referred from Dr. Barreto to discuss urinary incontinence and rectocele.     Krystina Henley is a 64 y.o. year old  presenting to be seen for prolapse incontinence.    She reports worsening prolapse symptoms over the past 6 months.  She does notice a bulge at the introitus.  She does frequently require splinting in order to defecate.  No splinting in order to void.  She is embarrassed by appearance.  She does have pressure symptoms as well.  He is sexually active and the prolapse is bothersome during intercourse.    Her urinary incontinence is extremely severe.  She leaks with all activity.  She leaks large volumes of urine.  She constantly wears pads. She leaks with coughing/sneezing/laughing. She uses 4-5 pads a day and has tried oxybutynin, Detrol, pelvic floor physical therapy, lifestyle changes and weight loss in the past. She has daytime frequency, urgency and nocturia.  Her symptoms significantly affect activities of daily life.  She is very active for her age and would like to be more so.    OB Hx: 3 term vaginal births  Pap smear: 2018 - normal  Mammogram: 2018 - BIRADS 2  Colonoscopy: unsure  DEXA Scan: unsure    She denies nausea, emesis, fevers, chills, significant weight changes, hair/skin/nail changes, palpitations, chest pain, headaches, myalgia, dyspnea.     History  Past Medical History:   Diagnosis Date   • Anemia    • Anxiety    • Colon polyp 2014   • Depression    • Diverticulosis    • Fibroid    • Hyperlipidemia    • Osteoarthritis    , Past Surgical History:   Procedure Laterality Date   • BREAST BIOPSY     • COLONOSCOPY  2014   • UPPER GASTROINTESTINAL ENDOSCOPY  2014   , Family History   Problem Relation Age of Onset   • Lung cancer Mother    • Lung cancer Father    • Prostate cancer Brother         x 3 brothers   • Heart attack Brother         x 2 brothers    • Colon cancer Neg Hx    •  "Cirrhosis Neg Hx    • Liver disease Neg Hx    • Liver cancer Neg Hx    • Crohn's disease Neg Hx    , Social History     Tobacco Use   • Smoking status: Former Smoker     Types: Cigarettes     Last attempt to quit: 7/16/2016     Years since quitting: 3.5   • Smokeless tobacco: Never Used   Substance Use Topics   • Alcohol use: Yes     Comment: social   • Drug use: No   ,   (Not in a hospital admission) and Allergies:  Patient has no known allergies.    Current Outpatient Medications on File Prior to Visit   Medication Sig Dispense Refill   • omeprazole (PrilOSEC) 40 MG capsule 1 po daily in the am 30 minutes before breakfast 30 capsule 5   • rosuvastatin (CRESTOR) 10 MG tablet Take 1 tablet by mouth Daily. 30 tablet 1     No current facility-administered medications on file prior to visit.        Social History    Tobacco Use      Smoking status: Former Smoker        Types: Cigarettes        Quit date: 7/16/2016        Years since quitting: 3.5      Smokeless tobacco: Never Used      Review of Systems  Pertinent items are noted in HPI, all other systems were reviewed and negative       Objective   /78   Ht 157.5 cm (62\")   Wt 74.4 kg (164 lb)   LMP  (LMP Unknown)   BMI 30.00 kg/m²     Physical Exam:  General Appearance: alert, pleasant, appears stated age, interactive and cooperative  Head: normocephalic, without obvious abnormality and atraumatic  Eyes: lids and lashes normal and no icterus  Ears: ears appear intact with no abnormalities noted  Nose: nares normal, septum midline, mucosa normal and no drainage  Neck: suppple, trachea midline and no thyromegaly  Back: no kyphosis present, no scoliosis present and range of motion normal  Lungs: respirations regular, respirations even and respirations unlabored, clear to auscultation bilaterally   Heart: regular rhythm and normal rate, normal S1, S2, no murmur, gallop, or rubs and no click  Breasts: Not performed.  Abdomen: no masses, no hepatomegaly, no " splenomegaly, soft non-tender, no guarding and no rebound tenderness  Extremities: moves extremities well, no edema, no cyanosis and no redness  Skin: no bleeding, bruising or rash and no lesions noted  Lymph Nodes: no palpable adenopathy  Neurologic: Cranial Nerves cranial nerves 2 - 12 grossly intact, Speech normal content and execusion, Coordination normal  Psych: normal mood and affect, oriented to person, time and place, thought content organized and appropriate judgment    Pelvis:  Pelvic: Clinical staff was present for exam  External genitalia:  normal appearance of the external genitalia including Bartholin's and [place]'s glands.  :  urethral meatus normal; urethra hypermobile;  Vagina:  atrophic mucosal changes are present;  Cervix:  normal appearance.  Uterus:  normal size, shape and consistency.  Adnexa:  normal bimanual exam of the adnexa.  Rectal:  anus visually normal appearing.  Significant rectocele.  No masses palpable on digital exam.  Cystocele GRADE 2  Rectocele GRADE 2  Uterine GRADE 2  Enterocele not appreciated  Pelvic floor pain: pelvic floor is nontender to palpation in 4 quadrants.    Positive supine cough stress test with an empty bladder.  Large volume leakage demonstrated today.    Review of Labs:   No data reviewed    Review of Imaging:  No data reviewed    Decision to obtain old records:  No.    Summarization of old records:  N/A    Independent review of image, tracing or specimen:  N/A       Assessment   Grade 2 uterovaginal prolapse with anterior, apical and posterior defects  Mixed urinary incontinence with stress predominance  Vaginal atrophy     Plan    Orders Placed This Encounter   Procedures   • Ambulatory Referral to Gynecologic Urology     Referral Priority:   Routine     Referral Type:   Consultation     Referral Reason:   Specialty Services Required     Requested Specialty:   Urogynecology     Number of Visits Requested:   1     Medications ordered: none    Procedures  performed: none    We reviewed her symptoms and exam findings in detail today.  We reviewed pelvic organ prolapse animations on the computer.  Given her significant prolapse and incontinence, I recommend she meet with a urogynecologist who can perform an apical suspension procedure (sacrocolpopexy vs uterosacral/sacrospinous ligament suspension), mid urethral sling and perineorrhaphy.  All questions answered.    Greater than 50% of this 45 minute visit was spent in face-to-face counseling and/or coordination of care for this patient.    David Estes MD  Obstetrics and Gynecology  [facility]

## 2020-02-19 ENCOUNTER — ANESTHESIA (OUTPATIENT)
Dept: GASTROENTEROLOGY | Facility: HOSPITAL | Age: 65
End: 2020-02-19

## 2020-02-19 ENCOUNTER — HOSPITAL ENCOUNTER (OUTPATIENT)
Facility: HOSPITAL | Age: 65
Setting detail: HOSPITAL OUTPATIENT SURGERY
Discharge: HOME | End: 2020-02-19
Attending: INTERNAL MEDICINE | Admitting: INTERNAL MEDICINE

## 2020-02-19 ENCOUNTER — ANESTHESIA EVENT (OUTPATIENT)
Dept: GASTROENTEROLOGY | Facility: HOSPITAL | Age: 65
End: 2020-02-19

## 2020-02-19 VITALS
BODY MASS INDEX: 31.03 KG/M2 | OXYGEN SATURATION: 99 % | DIASTOLIC BLOOD PRESSURE: 83 MMHG | TEMPERATURE: 97.3 F | HEIGHT: 62 IN | HEART RATE: 63 BPM | WEIGHT: 168.6 LBS | RESPIRATION RATE: 16 BRPM | SYSTOLIC BLOOD PRESSURE: 135 MMHG

## 2020-02-19 DIAGNOSIS — R13.10 DYSPHAGIA, UNSPECIFIED TYPE: ICD-10-CM

## 2020-02-19 DIAGNOSIS — R10.13 EPIGASTRIC PAIN: ICD-10-CM

## 2020-02-19 DIAGNOSIS — R12 HEARTBURN: ICD-10-CM

## 2020-02-19 PROCEDURE — 43239 EGD BIOPSY SINGLE/MULTIPLE: CPT | Performed by: INTERNAL MEDICINE

## 2020-02-19 PROCEDURE — C1726 CATH, BAL DIL, NON-VASCULAR: HCPCS | Performed by: INTERNAL MEDICINE

## 2020-02-19 PROCEDURE — 25010000002 FENTANYL CITRATE (PF) 100 MCG/2ML SOLUTION: Performed by: NURSE ANESTHETIST, CERTIFIED REGISTERED

## 2020-02-19 PROCEDURE — 88342 IMHCHEM/IMCYTCHM 1ST ANTB: CPT | Performed by: INTERNAL MEDICINE

## 2020-02-19 PROCEDURE — 25010000002 PROPOFOL 1000 MG/100ML EMULSION: Performed by: NURSE ANESTHETIST, CERTIFIED REGISTERED

## 2020-02-19 PROCEDURE — 43249 ESOPH EGD DILATION <30 MM: CPT | Performed by: INTERNAL MEDICINE

## 2020-02-19 PROCEDURE — 88305 TISSUE EXAM BY PATHOLOGIST: CPT | Performed by: INTERNAL MEDICINE

## 2020-02-19 RX ORDER — FENTANYL CITRATE 50 UG/ML
INJECTION, SOLUTION INTRAMUSCULAR; INTRAVENOUS AS NEEDED
Status: DISCONTINUED | OUTPATIENT
Start: 2020-02-19 | End: 2020-02-19 | Stop reason: SURG

## 2020-02-19 RX ORDER — SODIUM CHLORIDE 9 MG/ML
70 INJECTION, SOLUTION INTRAVENOUS CONTINUOUS PRN
Status: DISCONTINUED | OUTPATIENT
Start: 2020-02-19 | End: 2020-02-19 | Stop reason: HOSPADM

## 2020-02-19 RX ORDER — METOCLOPRAMIDE 5 MG/1
2.5 TABLET ORAL
Qty: 30 TABLET | Refills: 0 | Status: SHIPPED | OUTPATIENT
Start: 2020-02-19 | End: 2020-07-31 | Stop reason: HOSPADM

## 2020-02-19 RX ORDER — OMEPRAZOLE 40 MG/1
40 CAPSULE, DELAYED RELEASE ORAL NIGHTLY
Qty: 30 CAPSULE | Refills: 2 | Status: SHIPPED | OUTPATIENT
Start: 2020-02-19 | End: 2020-03-16 | Stop reason: SDUPTHER

## 2020-02-19 RX ORDER — SIMETHICONE 20 MG/.3ML
EMULSION ORAL AS NEEDED
Status: DISCONTINUED | OUTPATIENT
Start: 2020-02-19 | End: 2020-02-19 | Stop reason: HOSPADM

## 2020-02-19 RX ORDER — LIDOCAINE HYDROCHLORIDE 20 MG/ML
INJECTION, SOLUTION INTRAVENOUS AS NEEDED
Status: DISCONTINUED | OUTPATIENT
Start: 2020-02-19 | End: 2020-02-19 | Stop reason: SURG

## 2020-02-19 RX ORDER — PROPOFOL 10 MG/ML
INJECTION, EMULSION INTRAVENOUS AS NEEDED
Status: DISCONTINUED | OUTPATIENT
Start: 2020-02-19 | End: 2020-02-19 | Stop reason: SURG

## 2020-02-19 RX ADMIN — PROPOFOL 50 MG: 10 INJECTION, EMULSION INTRAVENOUS at 07:44

## 2020-02-19 RX ADMIN — PROPOFOL 50 MG: 10 INJECTION, EMULSION INTRAVENOUS at 07:29

## 2020-02-19 RX ADMIN — SODIUM CHLORIDE 70 ML/HR: 9 INJECTION, SOLUTION INTRAVENOUS at 06:42

## 2020-02-19 RX ADMIN — LIDOCAINE HYDROCHLORIDE 100 MG: 20 INJECTION, SOLUTION INTRAVENOUS at 07:29

## 2020-02-19 RX ADMIN — FENTANYL CITRATE 100 MCG: 50 INJECTION, SOLUTION INTRAMUSCULAR; INTRAVENOUS at 07:29

## 2020-02-19 RX ADMIN — PROPOFOL 50 MG: 10 INJECTION, EMULSION INTRAVENOUS at 07:36

## 2020-02-19 NOTE — ANESTHESIA PREPROCEDURE EVALUATION
Anesthesia Evaluation     Patient summary reviewed and Nursing notes reviewed   NPO Solid Status: > 8 hours  NPO Liquid Status: > 8 hours           Airway   Mallampati: II  TM distance: >3 FB  Neck ROM: full  No difficulty expected  Dental - normal exam     Pulmonary - normal exam   Cardiovascular - normal exam    (+) hyperlipidemia,       Neuro/Psych  GI/Hepatic/Renal/Endo    (+)  hiatal hernia, GERD well controlled, GI bleeding upper ,     Musculoskeletal     Abdominal  - normal exam   Substance History      OB/GYN          Other   arthritis,                      Anesthesia Plan    ASA 2     MAC     intravenous induction     Anesthetic plan, all risks, benefits, and alternatives have been provided, discussed and informed consent has been obtained with: patient.    Plan discussed with CRNA.

## 2020-02-19 NOTE — OP NOTE
PROCEDURE:  Upper Endoscopy with serial dilation of the esophagus to 18 mm and biopsies.    DATE OF PROCEDURE: February 19, 2020.    REFERRING PROVIDER:  Tre Lindquist MD.     INSTRUMENT: Olympus GIF H 190 video endoscope     INDICATIONS OF THE PROCEDURE: This is a 64-year-old white female with recurrent reflux, epigastric abdominal pain and dysphagia.        BIOPSIES: Second portion of duodenum.  Gastric antrum, body and fundus.  Mid and distal esophagus.     MEDICATIONS:  MAC.     PHOTOGRAPHS:  Photographs were included in the medical records.     CONSENT/PREPROCEDURE EVALUATION:  Risks, benefits, alternatives and options of the procedure including risks of anesthesia/sedation were discussed and informed consent was obtained prior to the procedure. History and physical examination were performed and nothing precluded the test.     REPORT:  The patient was placed in left lateral decubitus position. Once under the influence of IV sedation, the instrument was inserted into the mouth and esophagus was intubated under direct vision without difficulty.    Visualized portions of the hypopharyngeal and laryngopharyngeal areas including partial view of the vocal cords did not reveal significant pathology.     Esophagus:  Z line was noted to be around 35  cm.  Erosive distal esophagitis. LA class B.  Subtle concentric rings were noted within the distal, mid and more proximal esophagus.  Biopsies were obtained from the mid and distal esophagus.  An early diverticulum was noted within the distal esophagus.  Tortuosity and tertiary contractions of esophageal body were noted.  Schatzki's type ring was noted.  A small sliding hiatal hernia less than 3 cm was noted.  No Castellanos's esophagus was seen.     Stomach:  Antrum:  Erythematous gastritis.  Angulus, lesser and greater curves: Normal.  Retroflex examination: Sliding hiatal hernia.  Cardia and fundus: The patient had a tendency to burp.  Limited examination of the cardia and  fundus on retroflex view was possible.     Body of the stomach: Erythematous gastritis.  Good distensibility of the stomach was achieved no giant folds were noted.   Biopsies were obtained from the gastric antrum,  body and fundus.    Pylorus and pyloric channel:  normal.     Duodenum:  Bulb: Normal.  Second portion: normal.  No scalloping was seen in the second portion of duodenum.  Biopsies were obtained from the second portion of duodenum.    Intervention: Distal, mid and more proximal esophagus were dilated using 15-16.5-18 mm CRE balloon to 18 mm under vision without difficulty.  No active bleeding was noted.     The upper GI tract was decompressed and the scope was pulled out of the patient. The patient tolerated the procedure well.     DIAGNOSES:     1. Erosive distal esophagitis. LA class B.  2. Schatzki's type ring.  3. Subtle concentric rings within the distal, mid and proximal esophagus.  Status post serial dilation to 18 mm.  4. Erosive gastritis.  5. Distal esophageal diverticulum.  6. Tortuosity and tertiary contractions of esophageal body were noted.    RECOMMENDATIONS:  1.  Dietary instructions.  2.  Prilosec(Omeprazole) DR capsule 40 mg. Take one capsule in the morning half an hour before eating daily.  Prilosec (omeprazole) DR capsule 40 mg take 1 additional capsule in the evening orally daily.  3.  Follow biopsies.  4.  Follow up in office.  5.  Reglan (metoclopramide) 5 mg tablets.  Take one half tablet (2.5 mg) by mouth in the morning and in the evening (2 times daily preferably half an hour before food).     Thank you very much for letting me participate in the care of this patient. Please do not hesitate to call me if you have any questions.

## 2020-02-19 NOTE — INTERVAL H&P NOTE
"  H&P reviewed. The patient was examined and there are no changes to the H&P.       No recent shortness of breath or chest pains.      Blood pressure 143/90, pulse 71, temperature 97.4 °F (36.3 °C), temperature source Temporal, resp. rate 18, height 157.5 cm (62\"), weight 76.5 kg (168 lb 9.6 oz), SpO2 96 %.    Chest: clear to auscultation.  Cardiac exam: No S3 no murmurs.   Abdomen: soft bowel sounds present nondistended nontender.        "

## 2020-02-19 NOTE — DISCHARGE INSTRUCTIONS
No pushing, pulling, tugging,  heavy lifting, or strenuous activity.  No major decision making, driving, or drinking alcoholic beverages for 24 hours. ( due to the medications you have  received)  Always use good hand hygiene/washing techniques.  NO driving while taking pain medications.    * if you have an incision:  Check your incision area every day for signs of infection.   Check for:  * more redness, swelling, or pain  *more fluid or blood  *warmth  *pus or bad smell  To assist you in voiding:  Drink plenty of fluids  Listen to running water while attempting to void.    If you are unable to urinate and you have an uncomfortable urge to void or it has been   6 hours since you were discharged, return to the Emergency Room      NK F EGD DIL*********************************************************************    Postprocedure instructions:  1. Nothing by mouth until fully alert and as specified below .  2. Bedrest until fully alert.  3. Vital signs as routine.    Diet:   1. Nothing by mouth for 60 minutes, then if no chest pains the patient may have Clear liquids diet (No Sodas) for 4 hours.  2. May advance to soft diet in 4 hours if no chest pains, Fever or chills, nausea vomiting or bleeding.    Other instructions:  1. The patient may eat in upright position, chew well, take small bites and take medications in upright position.   2. The patient should drink water after 3-4 bites, and liberally with medications.    3. The patient should remain upright for about 10 minutes after eating and taking oral medications.    Blood Thinner and other medications Directions:  Avoid Aspirin & other NSAIDS for 3 days.  Tylenol is okay.    Other instructions:  The patient should avoid medications that have a potential to cause pill esophagitis including potassium pills, tetracycline capsules, iron pills, NSAIDs and bisphosphonates.    Follow-up:    DR. DENG VINCENT in 4 weeks.Office phone #  [phone].  ****************************************************************************************************    Notes to the patient and the family from Dr. Abdi.     Dear patient/family member,    Findings on today's procedure are as follows:    1. Erosive esophagitis. Inflammation of the esophagus.  2. Esophageal rings. Schatzki's ring. These areas were stretched.   3. Inflammation of the stomach. Gastritis.  4. Sliding hiatal hernia.  5. No cancer. No active ulcers.   6. Tortuosity of the esophageal body.  7. A small diverticulum in the lower portion of the esophagus (outpouching).    Recommendations:    1. Prilosec(Omeprazole) DR capsule 40 mg. Take one capsule in the morning half an hour before eating daily.  2. Prilosec(Omeprazole) DR capsule 40 mg. Take one capsule in the the evening orally daily.    3. Reglan (metoclopramide) 5 mg tablets.  Take one half tablet (2.5 mg) by mouth in the morning and in the evening (2 times daily preferably half an hour before food).  4. Other instructions as above.  5. Avoid blind insertion of NG tube in view of diverticulum towards the lower portion of the esophagus.  This may predispose the patient to potential perforation.    Should you have more questions please do not hesitate to talk to the nurse who can call me and let me talk to you.     I hope you feel better.    Marito Abdi M.D., FACP, FACG.

## 2020-02-19 NOTE — ANESTHESIA POSTPROCEDURE EVALUATION
Patient: Krystina Henley    Procedure Summary     Date:  02/19/20 Room / Location:  [facility] ENDOSCOPY 2 / [facility] ENDOSCOPY    Anesthesia Start:  0727 Anesthesia Stop:  0757    Procedure:  ESOPHAGOGASTRODUODENOSCOPY (N/A Esophagus) Diagnosis:       Esophagitis      Hiatal hernia      Esophageal diverticulum      Erosive gastritis      Esophageal ring      (Heartburn [R12])      (Epigastric pain [R10.13])      (Dysphagia, unspecified type [R13.10])    Surgeon:  Marito Abdi MD Provider:  Greg Marrero CRNA    Anesthesia Type:  MAC ASA Status:  2          Anesthesia Type: MAC    Vitals  Vitals Value Taken Time   /77 2/19/2020  8:00 AM   Temp 97.2 °F (36.2 °C) 2/19/2020  8:00 AM   Pulse 75 2/19/2020  8:00 AM   Resp 16 2/19/2020  8:00 AM   SpO2 99 % 2/19/2020  8:00 AM           Post Anesthesia Care and Evaluation    Patient location during evaluation: bedside  Patient participation: complete - patient participated  Level of consciousness: awake  Pain score: 0  Pain management: adequate  Airway patency: patent  Anesthetic complications: No anesthetic complications  PONV Status: controlled  Cardiovascular status: acceptable and stable  Respiratory status: acceptable and room air  Hydration status: acceptable

## 2020-02-20 ENCOUNTER — HOSPITAL ENCOUNTER (OUTPATIENT)
Dept: ULTRASOUND IMAGING | Facility: HOSPITAL | Age: 65
Discharge: HOME | End: 2020-02-20
Admitting: INTERNAL MEDICINE

## 2020-02-20 PROCEDURE — 76705 ECHO EXAM OF ABDOMEN: CPT

## 2020-02-25 LAB
LAB AP CASE REPORT: NORMAL
PATH REPORT.FINAL DX SPEC: NORMAL

## 2020-03-13 ENCOUNTER — TELEPHONE (OUTPATIENT)
Dept: GASTROENTEROLOGY | Facility: CLINIC | Age: 65
End: 2020-03-13

## 2020-03-13 DIAGNOSIS — K22.10 EROSIVE ESOPHAGITIS: ICD-10-CM

## 2020-03-13 DIAGNOSIS — K29.60 EROSIVE GASTRITIS: ICD-10-CM

## 2020-03-13 DIAGNOSIS — R12 HEARTBURN: Primary | ICD-10-CM

## 2020-03-16 RX ORDER — OMEPRAZOLE 40 MG/1
CAPSULE, DELAYED RELEASE ORAL
Qty: 60 CAPSULE | Refills: 3 | Status: SHIPPED | OUTPATIENT
Start: 2020-03-16 | End: 2020-07-31 | Stop reason: HOSPADM

## 2020-03-30 ENCOUNTER — OFFICE VISIT (OUTPATIENT)
Dept: GASTROENTEROLOGY | Facility: CLINIC | Age: 65
End: 2020-03-30

## 2020-03-30 DIAGNOSIS — K62.5 BRBPR (BRIGHT RED BLOOD PER RECTUM): ICD-10-CM

## 2020-03-30 DIAGNOSIS — R12 HEARTBURN: Primary | ICD-10-CM

## 2020-03-30 DIAGNOSIS — R13.10 DYSPHAGIA, UNSPECIFIED TYPE: ICD-10-CM

## 2020-03-30 DIAGNOSIS — R19.7 DIARRHEA, UNSPECIFIED TYPE: ICD-10-CM

## 2020-03-30 PROCEDURE — 99213 OFFICE O/P EST LOW 20 MIN: CPT | Performed by: INTERNAL MEDICINE

## 2020-03-30 NOTE — PATIENT INSTRUCTIONS
1. Antireflux measures.  2. Prilosec(Omeprazole) DR capsule 40 mg. Take one capsule in the morning half an hour before eating daily.  3. Low-fat-low lactose diet.  4. Reglan (metoclopramide) 5 mg tablets.  Take one half tablet (2.5 mg) by mouth in the morning, afternoon, evening and at night (4 times daily preferably half an hour before food end that time).  5. Hemorrhoid care.  6. The patient should eat relatively soft diet.    7. The patient should eat in upright position and chew well.  The patient should drink water after to 3 bites and take medications with liberal amounts of water.    8. Generally,  medications that have a potential to cause pill esophagitis may be avoided or used in an alternative form. For example Motrin or Aleve can be taken in a gel cap form.  9. After eating and taking medications the patient should remain in upright position for 10-15 minutes.  10. The patient has been advised to call back in 2 to 3 weeks regarding progress.  Follow-up in 3 to 4 weeks.  Should there be worsening of bleeding.  This visit has been rescheduled as a phone visit to comply with patient safety concerns in accordance with CDC recommendations. Total time of discussion was 15 minutes.

## 2020-03-30 NOTE — PROGRESS NOTES
Chief Complaint   Patient presents with   • Heartburn     History of Present Illness     You have chosen to receive care through a telephone visit today. Do you consent to use a telephone visit for your medical care today? Yes    The patient has a history of reflux off and on for the last several years.  Reflux is rather severe.  The symptoms are described as retrosternal burning sensation, indigestion and frequent regurgitation.  Currently, the patient is taking omeprazole 40 mg 1 orally twice a day with morning dose on empty stomach half an hour before breakfast.  Although improved the patient can still has significant reflux.  Although improved, the patient continues to have dysphagia off and on.  There is no recent weight loss.  The patient has epigastric abdominal discomfort attacks.  The patient claims that she had gained some weight.  The patient has nausea at times.  She continues to have some diarrhea at times.  There is history of small amount of bright red blood per rectum.  There is no hematemesis or melena.    She denies smoking.  She drinks soda perhaps once a week.  There is no alcohol consumption.  There is no liver or pancreatic disease.     Review of Systems   Constitutional: Negative for appetite change, chills, fatigue, fever and unexpected weight change.   HENT: Positive for trouble swallowing. Negative for mouth sores and nosebleeds.    Eyes: Negative for discharge and redness.   Respiratory: Negative for apnea, cough and shortness of breath.    Cardiovascular: Positive for chest pain. Negative for palpitations and leg swelling.   Gastrointestinal: Positive for blood in stool, diarrhea and nausea. Negative for abdominal distention, abdominal pain, anal bleeding, constipation and vomiting.   Endocrine: Negative for cold intolerance, heat intolerance and polydipsia.   Genitourinary: Negative for dysuria, hematuria and urgency.   Musculoskeletal: Positive for arthralgias and back pain. Negative  for joint swelling and myalgias.   Skin: Negative for rash.   Allergic/Immunologic: Negative for food allergies and immunocompromised state.   Neurological: Negative for dizziness, seizures, syncope and headaches.   Hematological: Negative for adenopathy. Does not bruise/bleed easily.   Psychiatric/Behavioral: Negative for dysphoric mood. The patient is nervous/anxious. The patient is not hyperactive.      Patient Active Problem List   Diagnosis   • Bright red blood per rectum   • Diarrhea   • Heartburn   • Epigastric pain   • Colon cancer screening   • Arthritis   • Anxiety   • Depression   • Hyperlipidemia   • Urinary incontinence   • Vitamin D deficiency, unspecified    • Gastroesophageal reflux disease without esophagitis   • Dysphagia     Past Medical History:   Diagnosis Date   • Anemia    • Anxiety    • Colon polyp 01/16/2014   • Depression    • Diverticulosis    • Fibroid    • Heartburn    • Hiatal hernia    • Hyperlipidemia    • Osteoarthritis     generalized     Past Surgical History:   Procedure Laterality Date   • BREAST BIOPSY     • COLONOSCOPY  01/16/2014   • ENDOSCOPY N/A 2/19/2020    Procedure: ESOPHAGOGASTRODUODENOSCOPY;  Surgeon: Marito Abdi MD;  Location: [facility] ENDOSCOPY;  Service: Gastroenterology;  Laterality: N/A;   • UPPER GASTROINTESTINAL ENDOSCOPY  03/13/2014     Family History   Problem Relation Age of Onset   • Lung cancer Mother    • Lung cancer Father    • Prostate cancer Brother         x 3 brothers   • Heart attack Brother         x 2 brothers    • Colon cancer Neg Hx    • Cirrhosis Neg Hx    • Liver disease Neg Hx    • Liver cancer Neg Hx    • Crohn's disease Neg Hx      Social History     Tobacco Use   • Smoking status: Former Smoker     Packs/day: 1.00     Years: 10.00     Pack years: 10.00     Types: Cigarettes     Last attempt to quit: 7/16/2016     Years since quitting: 3.7   • Smokeless tobacco: Never Used   Substance Use Topics   • Alcohol use: Not Currently     Comment:  social       Current Outpatient Medications:   •  metoclopramide (REGLAN) 5 MG tablet, Take 1/2 tablets by mouth 2 (Two) Times a Day Before Meals., Disp: 30 tablet, Rfl: 0  •  omeprazole (PrilOSEC) 40 MG capsule, Take 1 capsule every morning 30 minutes before breakfast and 1 capsule every evening., Disp: 60 capsule, Rfl: 3  •  rosuvastatin (CRESTOR) 10 MG tablet, Take 1 tablet by mouth Daily., Disp: 30 tablet, Rfl: 1    No Known Allergies    There were no vitals taken for this visit.      Procedures:    Dated February 19, 2020 the patient had undergone an upper endoscopy which revealed erosive distal esophagitis-LA class B, Schatzki's ring, and subtle concentric rings involving the distal mid and proximal esophagus.  The patient had undergone dilation of esophagus to 18 mm.  She was also found to have erosive gastritis and distal esophageal diverticulum.    Assessment:      ICD-10-CM ICD-9-CM   1. Heartburn R12 787.1   2. Dysphagia, unspecified type R13.10 787.20   3. Diarrhea, unspecified type R19.7 787.91   4. BRBPR (bright red blood per rectum) K62.5 569.3         Discussion:  1.     Plan/  Patient Instructions   1. Antireflux measures.  2. Prilosec(Omeprazole) DR capsule 40 mg. Take one capsule in the morning half an hour before eating daily.  3. Low-fat-low lactose diet.  4. Reglan (metoclopramide) 5 mg tablets.  Take one half tablet (2.5 mg) by mouth in the morning, afternoon, evening and at night (4 times daily preferably half an hour before food end that time).  5. Hemorrhoid care.  6. The patient should eat relatively soft diet.    7. The patient should eat in upright position and chew well.  The patient should drink water after to 3 bites and take medications with liberal amounts of water.    8. Generally,  medications that have a potential to cause pill esophagitis may be avoided or used in an alternative form. For example Motrin or Aleve can be taken in a gel cap form.  9. After eating and taking  medications the patient should remain in upright position for 10-15 minutes.  10. The patient has been advised to call back in 2 to 3 weeks regarding progress.  Follow-up in 3 to 4 weeks.  Should there be worsening of bleeding.  This visit has been rescheduled as a phone visit to comply with patient safety concerns in accordance with CDC recommendations. Total time of discussion was 15 minutes.         Marito Abdi MD

## 2020-04-02 ENCOUNTER — OFFICE VISIT (OUTPATIENT)
Dept: INTERNAL MEDICINE | Facility: CLINIC | Age: 65
End: 2020-04-02

## 2020-04-02 DIAGNOSIS — K21.9 GASTROESOPHAGEAL REFLUX DISEASE WITHOUT ESOPHAGITIS: ICD-10-CM

## 2020-04-02 DIAGNOSIS — E78.5 HYPERLIPIDEMIA, UNSPECIFIED HYPERLIPIDEMIA TYPE: Primary | ICD-10-CM

## 2020-04-02 PROBLEM — R19.7 DIARRHEA: Status: RESOLVED | Noted: 2018-07-16 | Resolved: 2020-04-02

## 2020-04-02 PROBLEM — R12 HEARTBURN: Status: RESOLVED | Noted: 2018-07-16 | Resolved: 2020-04-02

## 2020-04-02 PROCEDURE — 99212 OFFICE O/P EST SF 10 MIN: CPT | Performed by: INTERNAL MEDICINE

## 2020-04-02 NOTE — PROGRESS NOTES
You have chosen to receive care through a telephone visit today. Do you consent to use a telephone visit for your medical care today? Yes    Telephone visit discussed with  Ricardo Wan Yin MD Rose Linderman     Patient here for telephone visit  Hyperlipidemia on medication stable patient yet to do blood tests.  GERD status post endoscopy showed erosive gastritis and Schatzki's ring status post dilation.  Patient is taking acid suppressant and the Reglan.  Patient still complains some epigastric area pain when increase intra-abdominal pressure, light lifting heavy or bear down with bowel movement.  Ultrasound of the gallbladder showed gallbladder polyp only.  Anxiety depression stable.  Patient still has some diarrhea and hematochezia.  Patient is awaiting for colonoscopy after the pandemic.  Arthritis stable pending colonoscopy    Review of systems abdominal pain no fever chills no nausea vomiting diarrhea no weight loss per patient no chest pain no short of breath.  No significant joint pain muscle pain.    Assessment/plan    Hyperlipidemia, unspecified hyperlipidemia type  continue Crestor 10 mg do blood tests  -     CK  -     Comprehensive Metabolic Panel  -     Lipid Panel     Gastroesophageal reflux disease without esophagitis continue medication status post upper endoscopy continue follow-up with GI     Vitamin D deficiency, unspecified normal     Anxiety Depression,  stable     Diarrhea, unspecified type scope     Bright red blood per rectum scope     Epigastric pain follow-up with GI possible musculoskeletal related ultrasound showed gallbladder polyp     Arthritis stable now     Urinary incontinence, pending to see urologist      Colon cancer screening scope pending        3 months Medicare wellness    This visit has been rescheduled as a phone visit to comply with patient safety concerns in accordance with CDC recommendations. Total time of discussion was 11:44 minutes.

## 2020-04-17 RX ORDER — ROSUVASTATIN CALCIUM 10 MG/1
TABLET, COATED ORAL
Qty: 30 TABLET | Refills: 0 | Status: SHIPPED | OUTPATIENT
Start: 2020-04-17 | End: 2021-03-09 | Stop reason: SDUPTHER

## 2020-05-27 ENCOUNTER — LAB REQUISITION (OUTPATIENT)
Dept: LAB | Facility: HOSPITAL | Age: 65
End: 2020-05-27

## 2020-05-27 DIAGNOSIS — Z11.59 ENCOUNTER FOR SCREENING FOR OTHER VIRAL DISEASES: ICD-10-CM

## 2020-05-27 PROCEDURE — U0002 COVID-19 LAB TEST NON-CDC: HCPCS | Performed by: INTERNAL MEDICINE

## 2020-05-28 ENCOUNTER — TELEMEDICINE - AUDIO (OUTPATIENT)
Dept: GASTROENTEROLOGY | Facility: CLINIC | Age: 65
End: 2020-05-28

## 2020-05-28 DIAGNOSIS — R13.10 DYSPHAGIA, UNSPECIFIED TYPE: ICD-10-CM

## 2020-05-28 DIAGNOSIS — Z01.812 PRE-PROCEDURE LAB EXAM: ICD-10-CM

## 2020-05-28 DIAGNOSIS — R19.7 DIARRHEA, UNSPECIFIED TYPE: ICD-10-CM

## 2020-05-28 DIAGNOSIS — K92.1 MELENA: Primary | ICD-10-CM

## 2020-05-28 DIAGNOSIS — R10.13 EPIGASTRIC PAIN: ICD-10-CM

## 2020-05-28 DIAGNOSIS — R11.0 NAUSEA: ICD-10-CM

## 2020-05-28 DIAGNOSIS — R12 HEARTBURN: ICD-10-CM

## 2020-05-28 DIAGNOSIS — K62.5 BRIGHT RED BLOOD PER RECTUM: ICD-10-CM

## 2020-05-28 PROCEDURE — 99213 OFFICE O/P EST LOW 20 MIN: CPT | Performed by: NURSE PRACTITIONER

## 2020-05-28 RX ORDER — SODIUM CHLORIDE 9 MG/ML
70 INJECTION, SOLUTION INTRAVENOUS CONTINUOUS PRN
Status: CANCELLED | OUTPATIENT
Start: 2020-05-28

## 2020-05-28 RX ORDER — MULTIPLE VITAMINS W/ MINERALS TAB 9MG-400MCG
1 TAB ORAL DAILY
COMMUNITY
End: 2020-07-06

## 2020-05-28 RX ORDER — METOCLOPRAMIDE 5 MG/1
2.5 TABLET ORAL AS NEEDED
COMMUNITY
End: 2020-06-03 | Stop reason: HOSPADM

## 2020-05-28 NOTE — PATIENT INSTRUCTIONS
1. Antireflux measures: Avoid fried, fatty foods, alcohol, chocolate, coffee, tea,  soft drinks, peppermint and spearmint, spicy foods, tomatoes and tomato based foods, onion based foods, and smoking. Other antireflux measures include weight reduction if overweight, avoiding tight clothing around the abdomen, elevating the head of the bed 6 inches with blocks under the head board, and don't drink or eat before going to bed and avoid lying down immediately after meals.  2. Omeprazole 40 mg 1 by mouth in the am 30 minutes before breakfast and 1 every evening.  3. Zofran 4 mg 1 po every 8 hours as needed for nausea.   4. Metoclopramide 5 mg 1/2 tablet by mouth four times a day, preferably 30 minutes before meals and at bedtime. Discussed short and long term side effects. The patient verbalizes understanding.   5. High fiber diet with liberal water intake.   6. CBC  7. Dietary instructions.  The patient should eat relatively soft diet, and should eat in upright position and chew well.  The patient should drink water after 2-3 bites and take medications with liberal amounts of water.  Generally medications that have a potential to cause pill esophagitis may be avoided or used in an alternative form (for example if the patient needs potassium it may be used in a liquid rather than pill form).  Furthermore after eating and taking medications, the patient should remain in upright position for 5-10 minutes.  8. Upper endoscopy-EGD: Description of the procedure, risks, benefits, alternatives and options, including nonoperative options, were discussed with the patient in detail. The patient understands and wishes to proceed.  9. COVID-19 testing prior to procedure. The patient will need to self-quarantine after testing until the procedure. Instructions given to patient.  10. The patient will need colonoscopy in the future.    Unable to complete visit using a video connection to the patient. A phone visit was used to complete  this visit. Total time of discussion was 15 minutes.

## 2020-05-28 NOTE — PROGRESS NOTES
Chief Complaint   Patient presents with   • Follow-up     The patient has a history of black stools for the past 2 weeks. She has had 2 episodes. The patient denies taking Pepto Bismol. She has not had a change in her epigastric pain or nausea. The patient denies taking NSAIDs. The patient denies shortness of breath or dizziness.    There is a long standing history of difficulty swallowing. Swallowing has gradually worsened. After her last EGD, swallowing had improved for a few days, but has gradually worsened. She has difficulty eating and drinking. No history of weight loss.    There is a long standing history of heartburn. The patient is taking Omeprazole twice a day with moderate control of heartburn. Heartburn is severe. Reflux is worse at night.    There is a long standing history of nausea. The patient is taking Omeprazole twice a day and Metoclopramide as needed with mild improvement of nausea. The patient denies vomiting.     There is a long standing history of epigastric pain. The patient has the pain several days per week. The pain is moderate and cramping. Eating makes the pain worse.     There is a long standing history of diarrhea. The patient has diarrhea 2-4 times per day. Stools are described as loose.     There is a long standing history of rectal bleeding. The patient has bright red blood on the tissue a few times per week.     The patient's last colonoscopy was in 2014. There is no family history of colon cancer.    Heartburn   She complains of abdominal pain, heartburn and nausea. This is a chronic problem. Episode onset: over 5 years ago. The problem occurs frequently. The problem has been unchanged. The heartburn duration is an hour. The heartburn is located in the substernum. The heartburn is of severe intensity. The heartburn wakes her from sleep. Nothing aggravates the symptoms. There are no known risk factors. She has tried a PPI and a diet change for the symptoms. The treatment provided  moderate relief.   Nausea   This is a chronic problem. Episode onset: over 3 years ago. The problem occurs intermittently. The problem has been unchanged. Associated symptoms include abdominal pain and nausea. The symptoms are aggravated by eating. Treatments tried: Metoclopramide as needed. The treatment provided moderate relief.   Abdominal Pain   This is a chronic problem. Episode onset: over 3 years ago. The onset quality is gradual. The problem occurs intermittently. The problem has been unchanged. The pain is located in the epigastric region. The pain is moderate. The quality of the pain is cramping. The abdominal pain does not radiate. Associated symptoms include diarrhea, hematochezia and nausea. The pain is aggravated by eating. The pain is relieved by nothing. She has tried proton pump inhibitors for the symptoms. The treatment provided moderate relief. Prior diagnostic workup includes upper endoscopy. Her past medical history is significant for GERD.   Difficulty Swallowing   This is a chronic problem. Episode onset: over 3 years ago. The problem occurs intermittently. The problem has been gradually worsening. Associated symptoms include abdominal pain and nausea. The symptoms are aggravated by eating and drinking. She has tried nothing for the symptoms.   Diarrhea    This is a chronic problem. Episode onset: over 3 years ago. The problem occurs 2 to 4 times per day. The problem has been unchanged. Diarrhea characteristics: loose. The patient states that diarrhea does not awaken her from sleep. Associated symptoms include abdominal pain. Nothing aggravates the symptoms. There are no known risk factors. She has tried nothing for the symptoms.   Rectal Bleeding   This is a chronic problem. Episode onset: over 3 years ago. The problem occurs intermittently. The problem has been unchanged. Associated symptoms include abdominal pain and nausea. Nothing aggravates the symptoms. She has tried nothing for the  symptoms.     Review of Systems   HENT: Positive for trouble swallowing.    Gastrointestinal: Positive for abdominal pain, anal bleeding, blood in stool, diarrhea, heartburn, hematochezia and nausea.     Patient Active Problem List   Diagnosis   • Bright red blood per rectum   • Epigastric pain   • Colon cancer screening   • Arthritis   • Anxiety   • Depression   • Hyperlipidemia   • Urinary incontinence   • Vitamin D deficiency, unspecified    • Gastroesophageal reflux disease without esophagitis   • Dysphagia     Past Medical History:   Diagnosis Date   • Anemia    • Anxiety    • Colon polyp 01/16/2014   • Depression    • Diverticulosis    • Fibroid    • Heartburn    • Hiatal hernia    • Hyperlipidemia    • Osteoarthritis     generalized     Past Surgical History:   Procedure Laterality Date   • BREAST BIOPSY     • COLONOSCOPY  01/16/2014   • ENDOSCOPY N/A 2/19/2020    Procedure: ESOPHAGOGASTRODUODENOSCOPY;  Surgeon: Marito Abdi MD;  Location: [facility] ENDOSCOPY;  Service: Gastroenterology;  Laterality: N/A;   • UPPER GASTROINTESTINAL ENDOSCOPY  03/13/2014     Family History   Problem Relation Age of Onset   • Lung cancer Mother    • Lung cancer Father    • Prostate cancer Brother         x 3 brothers   • Heart attack Brother         x 2 brothers    • Colon cancer Neg Hx    • Cirrhosis Neg Hx    • Liver disease Neg Hx    • Liver cancer Neg Hx    • Crohn's disease Neg Hx      Social History     Tobacco Use   • Smoking status: Former Smoker     Packs/day: 1.00     Years: 10.00     Pack years: 10.00     Types: Cigarettes     Last attempt to quit: 7/16/2016     Years since quitting: 3.8   • Smokeless tobacco: Never Used   Substance Use Topics   • Alcohol use: Yes     Comment: social       Current Outpatient Medications:   •  metoclopramide (REGLAN) 5 MG tablet, Take 2.5 mg by mouth As Needed., Disp: , Rfl:   •  Multiple Vitamins-Minerals (MULTIVITAMIN WITH MINERALS) tablet tablet, Take 1 tablet by mouth Daily.,  Disp: , Rfl:   •  omeprazole (PrilOSEC) 40 MG capsule, Take 1 capsule every morning 30 minutes before breakfast and 1 capsule every evening. (Patient taking differently: 2 (Two) Times a Day. Take 1 capsule every morning 30 minutes before breakfast and 1 capsule every evening.), Disp: 60 capsule, Rfl: 3  •  rosuvastatin (CRESTOR) 10 MG tablet, TAKE 1 TABLET BY MOUTH ONE TIME A DAY , Disp: 30 tablet, Rfl: 0  •  metoclopramide (REGLAN) 5 MG tablet, Take 1/2 tablets by mouth 2 (Two) Times a Day Before Meals. (Patient taking differently: Take 2.5 mg by mouth 4 (Four) Times a Day.), Disp: 30 tablet, Rfl: 0    No Known Allergies    Physical Exam   Constitutional: She is oriented to person, place, and time.   Pulmonary/Chest:  No respiratory distress.  Neurological: She is alert and oriented to person, place, and time.   Psychiatric: She has a normal mood and affect. Her speech is normal and behavior is normal. Judgment and thought content normal.      Laboratory Tests:   Upon review of medical records:    Dated January 30, 2020 sodium 144 potassium 4.8 chloride 105 CO2 26.9 BUN 18 serum creatinine 0.92 glucose 85.  Calcium 8.7.  Total protein 6.6.  Albumin 4.00.  T bili <0.2 AST 24 ALT 20 alkaline phosphatase 85.  Total cholesterol 294.  Triglycerides 122.  TSH 4.660.  Vitamin D 25 hydroxy level 32.7.  WBC 5.08 hemoglobin 12.0 hematocrit 36.4 MCV 87.7 platelet count 292.    Abdominal Imaging:  Upon review of medical records:    Gallbladder ultrasound dated 2/20/2020 reveals liver parenchyma is normal in echogenicity. There are small polyps in the gallbladder wall There are no shadowing gallstones. The common duct is normal measuring 5.20 mm.    Procedures:  Upon review of medical records:    Colonoscopy dated 1/16/2014 reveals left-sided scattered diverticulosis.  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  Colon polyps.  Internal hemorrhoids.  Hypertrophic anal papilla.  Biopsy results not available.      EGD dated 3/13/2014 reveals linear erythema within the midesophagus which may represent pill esophagitis. Erythematous distal esophagitis, grade 1.  Small sliding hiatal hernia less than 3 centimeter.  Erythematous gastritis.  No Castellanos's mucosa was seen.  Subtle concentric rings were seen in the mid esophagus.  Duodenum second portion biopsy reveals benign small bowel mucosa with no diagnostic abnormality.  Antrum and body biopsy reveals chronic gastritis with lymphoid aggregate.  Negative for H. pylori.  Mid esophagus biopsy reveals reactive squamous mucosa.    Dated February 19, 2020 the patient had undergone an upper endoscopy which revealed erosive distal esophagitis-LA class B, Schatzki's ring, and subtle concentric rings involving the distal mid and proximal esophagus.  The patient had undergone dilation of esophagus to 18 mm.  She was also found to have erosive gastritis and distal esophageal diverticulum.    Assessment:      ICD-10-CM ICD-9-CM   1. Melena K92.1 578.1   2. Dysphagia, unspecified type R13.10 787.20   3. Epigastric pain R10.13 789.06   4. Nausea R11.0 787.02   5. Heartburn R12 787.1   6. Diarrhea, unspecified type R19.7 787.91   7. Bright red blood per rectum K62.5 569.3   8. Pre-procedure lab exam Z01.812 V72.63     Plan/  Patient Instructions   1. Antireflux measures: Avoid fried, fatty foods, alcohol, chocolate, coffee, tea,  soft drinks, peppermint and spearmint, spicy foods, tomatoes and tomato based foods, onion based foods, and smoking. Other antireflux measures include weight reduction if overweight, avoiding tight clothing around the abdomen, elevating the head of the bed 6 inches with blocks under the head board, and don't drink or eat before going to bed and avoid lying down immediately after meals.  2. Omeprazole 40 mg 1 by mouth in the am 30 minutes before breakfast and 1 every evening.  3. Zofran 4 mg 1 po every 8 hours as needed for nausea.   4. Metoclopramide 5 mg 1/2 tablet  by mouth four times a day, preferably 30 minutes before meals and at bedtime. Discussed short and long term side effects. The patient verbalizes understanding.   5. High fiber diet with liberal water intake.   6. CBC  7. Dietary instructions.  The patient should eat relatively soft diet, and should eat in upright position and chew well.  The patient should drink water after 2-3 bites and take medications with liberal amounts of water.  Generally medications that have a potential to cause pill esophagitis may be avoided or used in an alternative form (for example if the patient needs potassium it may be used in a liquid rather than pill form).  Furthermore after eating and taking medications, the patient should remain in upright position for 5-10 minutes.  8. Upper endoscopy-EGD: Description of the procedure, risks, benefits, alternatives and options, including nonoperative options, were discussed with the patient in detail. The patient understands and wishes to proceed.  9. COVID-19 testing prior to procedure. The patient will need to self-quarantine after testing until the procedure. Instructions given to patient.    Unable to complete visit using a video connection to the patient. A phone visit was used to complete this visit. Total time of discussion was 15 minutes.     PETTY Elder

## 2020-05-29 PROBLEM — R12 HEARTBURN: Status: ACTIVE | Noted: 2020-05-29

## 2020-05-29 PROBLEM — K92.1 MELENA: Status: ACTIVE | Noted: 2020-05-29

## 2020-05-29 PROBLEM — R11.0 NAUSEA: Status: ACTIVE | Noted: 2020-05-29

## 2020-05-29 LAB
REF LAB TEST METHOD: NORMAL
SARS-COV-2 RNA RESP QL NAA+PROBE: NOT DETECTED

## 2020-06-01 ENCOUNTER — LAB (OUTPATIENT)
Dept: LAB | Facility: HOSPITAL | Age: 65
End: 2020-06-01

## 2020-06-01 DIAGNOSIS — K92.1 MELENA: ICD-10-CM

## 2020-06-01 DIAGNOSIS — Z01.812 PRE-PROCEDURE LAB EXAM: ICD-10-CM

## 2020-06-01 LAB
BASOPHILS # BLD AUTO: 0.07 10*3/MM3 (ref 0–0.2)
BASOPHILS NFR BLD AUTO: 1.4 % (ref 0–1.5)
DEPRECATED RDW RBC AUTO: 43.8 FL (ref 37–54)
EOSINOPHIL # BLD AUTO: 0.24 10*3/MM3 (ref 0–0.4)
EOSINOPHIL NFR BLD AUTO: 4.8 % (ref 0.3–6.2)
ERYTHROCYTE [DISTWIDTH] IN BLOOD BY AUTOMATED COUNT: 14 % (ref 12.3–15.4)
HCT VFR BLD AUTO: 37.1 % (ref 34–46.6)
HGB BLD-MCNC: 12.5 G/DL (ref 12–15.9)
IMM GRANULOCYTES # BLD AUTO: 0.01 10*3/MM3 (ref 0–0.05)
IMM GRANULOCYTES NFR BLD AUTO: 0.2 % (ref 0–0.5)
LYMPHOCYTES # BLD AUTO: 2.26 10*3/MM3 (ref 0.7–3.1)
LYMPHOCYTES NFR BLD AUTO: 45.4 % (ref 19.6–45.3)
MCH RBC QN AUTO: 28.9 PG (ref 26.6–33)
MCHC RBC AUTO-ENTMCNC: 33.7 G/DL (ref 31.5–35.7)
MCV RBC AUTO: 85.9 FL (ref 79–97)
MONOCYTES # BLD AUTO: 0.47 10*3/MM3 (ref 0.1–0.9)
MONOCYTES NFR BLD AUTO: 9.4 % (ref 5–12)
NEUTROPHILS # BLD AUTO: 1.93 10*3/MM3 (ref 1.7–7)
NEUTROPHILS NFR BLD AUTO: 38.8 % (ref 42.7–76)
NRBC BLD AUTO-RTO: 0 /100 WBC (ref 0–0.2)
PLATELET # BLD AUTO: 306 10*3/MM3 (ref 140–450)
PMV BLD AUTO: 11.3 FL (ref 6–12)
RBC # BLD AUTO: 4.32 10*6/MM3 (ref 3.77–5.28)
WBC NRBC COR # BLD: 4.98 10*3/MM3 (ref 3.4–10.8)

## 2020-06-01 PROCEDURE — U0002 COVID-19 LAB TEST NON-CDC: HCPCS

## 2020-06-01 PROCEDURE — 85025 COMPLETE CBC W/AUTO DIFF WBC: CPT

## 2020-06-01 PROCEDURE — 36415 COLL VENOUS BLD VENIPUNCTURE: CPT

## 2020-06-01 PROCEDURE — C9803 HOPD COVID-19 SPEC COLLECT: HCPCS

## 2020-06-01 PROCEDURE — U0004 COV-19 TEST NON-CDC HGH THRU: HCPCS

## 2020-06-02 LAB
REF LAB TEST METHOD: NORMAL
SARS-COV-2 RNA RESP QL NAA+PROBE: NOT DETECTED

## 2020-06-03 ENCOUNTER — ANESTHESIA (OUTPATIENT)
Dept: GASTROENTEROLOGY | Facility: HOSPITAL | Age: 65
End: 2020-06-03

## 2020-06-03 ENCOUNTER — ANESTHESIA EVENT (OUTPATIENT)
Dept: GASTROENTEROLOGY | Facility: HOSPITAL | Age: 65
End: 2020-06-03

## 2020-06-03 ENCOUNTER — HOSPITAL ENCOUNTER (OUTPATIENT)
Facility: HOSPITAL | Age: 65
Setting detail: HOSPITAL OUTPATIENT SURGERY
Discharge: HOME | End: 2020-06-03
Attending: INTERNAL MEDICINE | Admitting: INTERNAL MEDICINE

## 2020-06-03 VITALS
HEIGHT: 62 IN | RESPIRATION RATE: 16 BRPM | SYSTOLIC BLOOD PRESSURE: 117 MMHG | DIASTOLIC BLOOD PRESSURE: 69 MMHG | BODY MASS INDEX: 29.81 KG/M2 | HEART RATE: 58 BPM | OXYGEN SATURATION: 98 % | TEMPERATURE: 97.6 F | WEIGHT: 162 LBS

## 2020-06-03 DIAGNOSIS — R10.13 EPIGASTRIC PAIN: ICD-10-CM

## 2020-06-03 DIAGNOSIS — R11.0 NAUSEA: ICD-10-CM

## 2020-06-03 DIAGNOSIS — R13.10 DYSPHAGIA, UNSPECIFIED TYPE: ICD-10-CM

## 2020-06-03 DIAGNOSIS — R12 HEARTBURN: ICD-10-CM

## 2020-06-03 DIAGNOSIS — K92.1 MELENA: ICD-10-CM

## 2020-06-03 PROCEDURE — C1726 CATH, BAL DIL, NON-VASCULAR: HCPCS | Performed by: INTERNAL MEDICINE

## 2020-06-03 PROCEDURE — 43239 EGD BIOPSY SINGLE/MULTIPLE: CPT | Performed by: INTERNAL MEDICINE

## 2020-06-03 PROCEDURE — 43249 ESOPH EGD DILATION <30 MM: CPT | Performed by: INTERNAL MEDICINE

## 2020-06-03 PROCEDURE — 25010000002 FENTANYL CITRATE (PF) 100 MCG/2ML SOLUTION: Performed by: NURSE ANESTHETIST, CERTIFIED REGISTERED

## 2020-06-03 PROCEDURE — 88305 TISSUE EXAM BY PATHOLOGIST: CPT | Performed by: INTERNAL MEDICINE

## 2020-06-03 PROCEDURE — 25010000002 PROPOFOL 200 MG/20ML EMULSION: Performed by: NURSE ANESTHETIST, CERTIFIED REGISTERED

## 2020-06-03 RX ORDER — OMEPRAZOLE 20 MG/1
20 CAPSULE, DELAYED RELEASE ORAL NIGHTLY
Qty: 30 CAPSULE | Refills: 1 | Status: SHIPPED | OUTPATIENT
Start: 2020-06-03 | End: 2020-09-28 | Stop reason: SDUPTHER

## 2020-06-03 RX ORDER — LIDOCAINE HYDROCHLORIDE 20 MG/ML
INJECTION, SOLUTION INTRAVENOUS AS NEEDED
Status: DISCONTINUED | OUTPATIENT
Start: 2020-06-03 | End: 2020-06-03 | Stop reason: SURG

## 2020-06-03 RX ORDER — PROPOFOL 10 MG/ML
INJECTION, EMULSION INTRAVENOUS AS NEEDED
Status: DISCONTINUED | OUTPATIENT
Start: 2020-06-03 | End: 2020-06-03 | Stop reason: SURG

## 2020-06-03 RX ORDER — SIMETHICONE 20 MG/.3ML
EMULSION ORAL AS NEEDED
Status: DISCONTINUED | OUTPATIENT
Start: 2020-06-03 | End: 2020-06-03 | Stop reason: HOSPADM

## 2020-06-03 RX ORDER — SODIUM CHLORIDE 0.9 % (FLUSH) 0.9 %
10 SYRINGE (ML) INJECTION AS NEEDED
Status: DISCONTINUED | OUTPATIENT
Start: 2020-06-03 | End: 2020-06-03 | Stop reason: HOSPADM

## 2020-06-03 RX ORDER — SODIUM CHLORIDE 9 MG/ML
70 INJECTION, SOLUTION INTRAVENOUS CONTINUOUS PRN
Status: DISCONTINUED | OUTPATIENT
Start: 2020-06-03 | End: 2020-06-03 | Stop reason: HOSPADM

## 2020-06-03 RX ORDER — MAGNESIUM HYDROXIDE 1200 MG/15ML
LIQUID ORAL AS NEEDED
Status: DISCONTINUED | OUTPATIENT
Start: 2020-06-03 | End: 2020-06-03 | Stop reason: HOSPADM

## 2020-06-03 RX ORDER — FENTANYL CITRATE 50 UG/ML
INJECTION, SOLUTION INTRAMUSCULAR; INTRAVENOUS AS NEEDED
Status: DISCONTINUED | OUTPATIENT
Start: 2020-06-03 | End: 2020-06-03 | Stop reason: SURG

## 2020-06-03 RX ADMIN — PROPOFOL 50 MG: 10 INJECTION, EMULSION INTRAVENOUS at 08:24

## 2020-06-03 RX ADMIN — PROPOFOL 50 MG: 10 INJECTION, EMULSION INTRAVENOUS at 08:16

## 2020-06-03 RX ADMIN — SODIUM CHLORIDE 70 ML/HR: 9 INJECTION, SOLUTION INTRAVENOUS at 07:15

## 2020-06-03 RX ADMIN — FENTANYL CITRATE 100 MCG: 50 INJECTION, SOLUTION INTRAMUSCULAR; INTRAVENOUS at 08:05

## 2020-06-03 RX ADMIN — PROPOFOL 50 MG: 10 INJECTION, EMULSION INTRAVENOUS at 08:05

## 2020-06-03 RX ADMIN — LIDOCAINE HYDROCHLORIDE 100 MG: 20 INJECTION, SOLUTION INTRAVENOUS at 08:05

## 2020-06-03 NOTE — OP NOTE
PROCEDURE:  Upper Endoscopy with serial dilation of the esophagus to 18 mm and biopsies.    DATE OF PROCEDURE: Yari 3, 2020.    REFERRING PROVIDER:  Tre Lindquist MD.     INSTRUMENT:  Olympus GIF H 190 video endoscope     INDICATIONS OF THE PROCEDURE: This is a 64-year-old white female with history of recurrent reflux, dysphagia, epigastric abdominal pain and history of melena.        BIOPSIES: Second portion of duodenum.  Gastric antrum, body and fundus.       MEDICATIONS:  MAC.     PHOTOGRAPHS:  Photographs were included in the medical records.     CONSENT/PREPROCEDURE EVALUATION:  Risks, benefits, alternatives and options of the procedure including risks of anesthesia/sedation were discussed and informed consent was obtained prior to the procedure. History and physical examination were performed and nothing precluded the test.     REPORT:  The patient was placed in left lateral decubitus position. Once under the influence of IV sedation, the instrument was inserted into the mouth and esophagus was intubated under direct vision without difficulty.     Esophagus:  Z line was noted to be around 33 cm.  Erosive distal esophagitis. LA class A.  Tortuosity and tertiary contractions of esophageal body were noted.  Early stricturing of the distal esophagus was seen.  Free gastroesophageal reflux was seen.  A small sliding hiatal hernia less than 3 cm was noted.  No Castellanos's esophagus was seen.       Stomach:  Antrum:  Erythematous gastritis.  Angulus, lesser and greater curves: Normal.  Retroflex examination: Sliding hiatal hernia.  Cardia and fundus: 3 mm sessile polyp at the fundus was removed with cold biopsy forceps.     Body of the stomach: Erythematous gastritis.  Good distensibility of the stomach was achieved no giant folds were noted.   Biopsies were obtained from the gastric antrum,  body and fundus.  3 mm sessile polyp was removed at the body of the stomach.    Pylorus and pyloric channel:  normal.      Duodenum:  Bulb: Normal.  Second portion: normal.  No scalloping was seen in the second portion of duodenum.  Biopsies were obtained from the second portion of duodenum.    Intervention: Distal, mid and more proximal esophagus were dilated using 15-16.5-18 mm CRE balloon to 18 mm under vision without difficulty.  No active bleeding was noted.       The upper GI tract was decompressed and the scope was pulled out of the patient. The patient tolerated the procedure well.     DIAGNOSES:     1. Erosive distal esophagitis. LA class A.  2. Distal esophageal angulation and early stricturing.  Status post serial dilation to 18 mm.  3. Free gastroesophageal reflux.  4. Moderate sliding hiatal hernia around 3 to 4 cm.  5. Erythematous gastritis.     6. No definite Castellanos's.      RECOMMENDATIONS:  1.  Dietary instructions.  2.  Prilosec(Omeprazole) DR capsule 40 mg. Take one capsule in the morning half an hour before eating daily.  3.  Prilosec (Omeprazole) DR capsule 20 mg. Take one capsule orally in the evening daily.  4.  Reglan (metoclopramide) 5 mg tablets.  Take one half tablet (2.5 mg) by mouth in the morning and in the evening (2 times daily preferably half an hour before food).  5.  Follow biopsies.  6.  Follow up in office.     Thank you very much for letting me participate in the care of this patient. Please do not hesitate to call me if you have any questions.

## 2020-06-03 NOTE — ANESTHESIA PREPROCEDURE EVALUATION
Anesthesia Evaluation     Patient summary reviewed and Nursing notes reviewed   no history of anesthetic complications:  NPO Solid Status: > 8 hours  NPO Liquid Status: > 8 hours           Airway   Mallampati: II  TM distance: >3 FB  Neck ROM: full  no difficulty expected  Dental - normal exam     Pulmonary - normal exam   (+) a smoker Former,   Cardiovascular - normal exam    Rhythm: regular  Rate: normal    (+) hyperlipidemia,       Neuro/Psych  (+) psychiatric history Anxiety and Depression,     GI/Hepatic/Renal/Endo    (+) obesity,  hiatal hernia, GERD, GI bleeding ,     Musculoskeletal     Abdominal    Substance History - negative use     OB/GYN negative ob/gyn ROS         Other   arthritis,                      Anesthesia Plan    ASA 2     MAC   (Pt told that intravenous sedation will be used as the primary anesthetic along with local anesthesia if necessary. Every effort will be made to make sure the patient is comfortable.     The patient was told they may or may not have recall for the procedure. It was further explained that if the MAC was not adequate that a general anesthetic with either an LMA or endotracheal tube would be required.     Will proceed with the plan of care.)  intravenous induction     Anesthetic plan, all risks, benefits, and alternatives have been provided, discussed and informed consent has been obtained with: patient.

## 2020-06-03 NOTE — INTERVAL H&P NOTE
"  H&P reviewed. The patient was examined and there are no changes to the H&P.       No recent shortness of breath or chest pains.      Blood pressure 132/84, pulse 78, temperature 97.5 °F (36.4 °C), resp. rate 18, height 157.5 cm (62\"), weight 73.5 kg (162 lb), SpO2 97 %.    Chest: clear to auscultation.  Cardiac exam: No S3 no murmurs.   Abdomen: soft bowel sounds present nondistended nontender.        "

## 2020-06-03 NOTE — DISCHARGE INSTRUCTIONS
No pushing, pulling, tugging,  heavy lifting, or strenuous activity.  No major decision making, driving, or drinking alcoholic beverages for 24 hours. ( due to the medications you have  received)  Always use good hand hygiene/washing techniques.  NO driving while taking pain medications.    To assist you in voiding:  Drink plenty of fluids  Listen to running water while attempting to void.    If you are unable to urinate and you have an uncomfortable urge to void or it has been   6 hours since you were discharged, return to the Emergency Room    NK F EGD DIL*********************************************************************    Postprocedure instructions:  1. Nothing by mouth until fully alert and as specified below .  2. Bedrest until fully alert.  3. Vital signs as routine.    Diet:   1. Nothing by mouth for 60 minutes, then if no chest pains the patient may have Clear liquids diet (No Sodas) for 4 hours.  2. May advance to soft diet in 4 hours if no chest pains, Fever or chills, nausea vomiting or bleeding.    Other instructions:  1. The patient may eat in upright position, chew well, take small bites and take medications in upright position.   2. The patient should drink water after 3-4 bites, and liberally with medications.    3. The patient should remain upright for about 10 minutes after eating and taking oral medications.    Blood Thinner and other medications Directions:  Avoid Aspirin & other NSAIDS for 3 days.  Tylenol is okay.    Other instructions:  The patient should avoid medications that have a potential to cause pill esophagitis including potassium pills, tetracycline capsules, iron pills, NSAIDs and bisphosphonates.    Follow-up:    DR. DENG ABDI in 4 weeks.Office phone # [phone].  ****************************************************************************************************    Notes to the patient and the family from Dr. Abdi.     Dear patient/family member,    Findings on today's  procedure are as follows:    1. Esophagitis. Inflammation of the esophagus.  2. Esophageal scarring.  This was stretched.  3. Small number of polyps.  2 were removed.  4. Inflammation of the stomach. Gastritis.  5. Small sliding hiatal hernia.  6. No cancer. No active ulcers.   7. Free gastroesophageal reflux.    Recommendations:    1. Prilosec(Omeprazole) DR capsule 40 mg. Take one capsule in the morning half an hour before eating daily.  2. Prilosec (Omeprazole) DR capsule 20 mg. Take one capsule orally in the evening daily.   3. Reglan (metoclopramide) 5 mg tablets.  Take one half tablet (2.5 mg) by mouth in the morning and in the evening (2 times daily preferably half an hour before food).  4. Other instructions as above.    Should you have more questions please do not hesitate to talk to the nurse who can call me and let me talk to you.     I hope you feel better.    Marito Abdi M.D., FACP, FACG.

## 2020-06-03 NOTE — ANESTHESIA POSTPROCEDURE EVALUATION
Patient: Krystina Henley    Procedure Summary     Date:  06/03/20 Room / Location:  [facility] ENDOSCOPY [facility]    Anesthesia Start:  0806 Anesthesia Stop:  0831    Procedure:  ESOPHAGOGASTRODUODENOSCOPY (N/A Esophagus) Diagnosis:       Esophagitis      Hiatal hernia      Esophageal stenosis      Nausea      (Melena [K92.1])      (Dysphagia, unspecified type [R13.10])      (Epigastric pain [R10.13])      (Nausea [R11.0])      (Heartburn [R12])    Surgeon:  Marito Abdi MD Provider:  Greg Marrero CRNA    Anesthesia Type:  MAC ASA Status:  2          Anesthesia Type: MAC    Vitals  Vitals Value Taken Time   /69 6/3/2020  9:06 AM   Temp 97.6 °F (36.4 °C) 6/3/2020  8:34 AM   Pulse 58 6/3/2020  9:06 AM   Resp 16 6/3/2020  9:06 AM   SpO2 98 % 6/3/2020  9:06 AM           Post Anesthesia Care and Evaluation    Patient location during evaluation: bedside  Patient participation: complete - patient participated  Level of consciousness: awake  Pain score: 0  Pain management: adequate  Airway patency: patent  Anesthetic complications: No anesthetic complications  PONV Status: controlled  Cardiovascular status: acceptable and stable  Respiratory status: acceptable and room air  Hydration status: acceptable

## 2020-06-05 LAB
LAB AP CASE REPORT: NORMAL
PATH REPORT.FINAL DX SPEC: NORMAL

## 2020-07-06 ENCOUNTER — OFFICE VISIT (OUTPATIENT)
Dept: GASTROENTEROLOGY | Facility: CLINIC | Age: 65
End: 2020-07-06

## 2020-07-06 VITALS
WEIGHT: 167 LBS | TEMPERATURE: 96.8 F | HEART RATE: 70 BPM | SYSTOLIC BLOOD PRESSURE: 114 MMHG | HEIGHT: 62 IN | BODY MASS INDEX: 30.73 KG/M2 | DIASTOLIC BLOOD PRESSURE: 80 MMHG

## 2020-07-06 DIAGNOSIS — R12 HEARTBURN: Primary | ICD-10-CM

## 2020-07-06 DIAGNOSIS — R10.814 LEFT LOWER QUADRANT ABDOMINAL TENDERNESS WITHOUT REBOUND TENDERNESS: ICD-10-CM

## 2020-07-06 DIAGNOSIS — R13.10 DYSPHAGIA, UNSPECIFIED TYPE: ICD-10-CM

## 2020-07-06 DIAGNOSIS — K62.5 BRBPR (BRIGHT RED BLOOD PER RECTUM): ICD-10-CM

## 2020-07-06 DIAGNOSIS — R19.7 DIARRHEA, UNSPECIFIED TYPE: ICD-10-CM

## 2020-07-06 PROCEDURE — 99214 OFFICE O/P EST MOD 30 MIN: CPT | Performed by: INTERNAL MEDICINE

## 2020-07-06 RX ORDER — LEVOFLOXACIN 500 MG/1
500 TABLET, FILM COATED ORAL DAILY
Qty: 7 TABLET | Refills: 0 | Status: SHIPPED | OUTPATIENT
Start: 2020-07-06 | End: 2020-07-31 | Stop reason: HOSPADM

## 2020-07-06 RX ORDER — METRONIDAZOLE 250 MG/1
TABLET ORAL
Qty: 28 TABLET | Refills: 0 | Status: SHIPPED | OUTPATIENT
Start: 2020-07-06 | End: 2020-07-31 | Stop reason: HOSPADM

## 2020-07-06 NOTE — PROGRESS NOTES
"Chief Complaint   Patient presents with   • Heartburn     History of Present Illness     The patient has a history of reflux off and on for the last several years.  The reflux is rather severe.  Symptoms are described as retrosternal burning sensation, and indigestion.  There is history of occasional regurgitative symptoms.  Frequency being several times per week.  The symptoms are worse at night.  The patient takes acid suppressive therapy with significant breakthrough symptoms.  The patient denies significant consumption of soda beverages, coffee or tea.  She denies eating chocolates.  The patient is a non-smoker.  She however drinks wine perhaps 2 drinks a week.  There is history of some recent weight gain of about 5 pounds over the last 1 month or so.  Her swallowing has significantly improved.    The patient has history of diarrhea off-and-on for the last several months.  Diarrhea is episodic and occurs perhaps couple of times a week.  Severity is mild, frequency of bowel movements being 2-3 times a day.  The stools are described as loose.  Occasionally, there is nocturnal element of diarrhea. The diarrhea is not associated with tenesmus.  The patient has been having recurrent bright red blood per rectum.  She has also noticed passage of \"clots\".  There is no associated dizziness.  There is some soreness at the rectal area.  There is no history of hematemesis or melena.  Her previous colonoscopy was in 2014.    There is no history of liver or pancreatic disease.  There is no family history of colon cancer, inflammatory bowel disease, celiac disease or chronic liver disease.     Review of Systems   Constitutional: Negative for appetite change, chills, fatigue, fever and unexpected weight change.   HENT: Negative for mouth sores, nosebleeds and trouble swallowing.    Eyes: Negative for discharge and redness.   Respiratory: Negative for apnea, cough and shortness of breath.    Cardiovascular: Negative for chest " pain, palpitations and leg swelling.   Gastrointestinal: Positive for anal bleeding and diarrhea. Negative for abdominal distention, abdominal pain, blood in stool, constipation, nausea and vomiting.   Endocrine: Negative for cold intolerance, heat intolerance and polydipsia.   Genitourinary: Negative for dysuria, hematuria and urgency.   Musculoskeletal: Positive for arthralgias. Negative for joint swelling and myalgias.   Skin: Negative for rash.   Allergic/Immunologic: Negative for food allergies and immunocompromised state.   Neurological: Negative for dizziness, seizures, syncope and headaches.   Hematological: Negative for adenopathy. Does not bruise/bleed easily.   Psychiatric/Behavioral: Negative for dysphoric mood. The patient is nervous/anxious. The patient is not hyperactive.      Patient Active Problem List   Diagnosis   • Bright red blood per rectum   • Epigastric pain   • Colon cancer screening   • Arthritis   • Anxiety   • Depression   • Hyperlipidemia   • Urinary incontinence   • Vitamin D deficiency, unspecified    • Gastroesophageal reflux disease without esophagitis   • Dysphagia   • Melena   • Nausea   • Heartburn     Past Medical History:   Diagnosis Date   • Anemia    • Anxiety    • Colon polyp 01/16/2014   • Depression    • Diverticulosis    • Elevated cholesterol    • Fibroid    • GERD (gastroesophageal reflux disease)    • Heartburn    • Hiatal hernia    • History of transfusion     No past rxns   • Hyperlipidemia    • Osteoarthritis     generalized     Past Surgical History:   Procedure Laterality Date   • BREAST BIOPSY     • COLONOSCOPY  01/16/2014   • ENDOSCOPY N/A 2/19/2020    Procedure: ESOPHAGOGASTRODUODENOSCOPY;  Surgeon: Marito Abdi MD;  Location: [facility] ENDOSCOPY;  Service: Gastroenterology;  Laterality: N/A;   • ENDOSCOPY N/A 6/3/2020    Procedure: ESOPHAGOGASTRODUODENOSCOPY;  Surgeon: Marito Abdi MD;  Location: [facility] ENDOSCOPY;  Service: Gastroenterology;  Laterality:  "N/A;   • UPPER GASTROINTESTINAL ENDOSCOPY  03/13/2014     Family History   Problem Relation Age of Onset   • Lung cancer Mother    • Lung cancer Father    • Prostate cancer Brother         x 3 brothers   • Heart attack Brother         x 2 brothers    • Colon cancer Neg Hx    • Cirrhosis Neg Hx    • Liver disease Neg Hx    • Liver cancer Neg Hx    • Crohn's disease Neg Hx      Social History     Tobacco Use   • Smoking status: Former Smoker     Packs/day: 1.00     Years: 10.00     Pack years: 10.00     Types: Cigarettes     Last attempt to quit: 7/16/2016     Years since quitting: 3.9   • Smokeless tobacco: Never Used   Substance Use Topics   • Alcohol use: Yes     Comment: social       Current Outpatient Medications:   •  omeprazole (priLOSEC) 20 MG capsule, Take 1 capsule by mouth Every Night., Disp: 30 capsule, Rfl: 1  •  omeprazole (PrilOSEC) 40 MG capsule, Take 1 capsule every morning 30 minutes before breakfast and 1 capsule every evening. (Patient taking differently: Take 40 mg by mouth Daily. Take 1 capsule every morning 30 minutes before breakfast), Disp: 60 capsule, Rfl: 3  •  levoFLOXacin (Levaquin) 500 MG tablet, Take 1 tablet by mouth Daily., Disp: 7 tablet, Rfl: 0  •  metoclopramide (REGLAN) 5 MG tablet, Take 1/2 tablets by mouth 2 (Two) Times a Day Before Meals., Disp: 30 tablet, Rfl: 0  •  metroNIDAZOLE (Flagyl) 250 MG tablet, Take 1 tablet by mouth four times a day x 7 days, Disp: 28 tablet, Rfl: 0  •  rosuvastatin (CRESTOR) 10 MG tablet, TAKE 1 TABLET BY MOUTH ONE TIME A DAY  (Patient taking differently: Take 10 mg by mouth Every Night.), Disp: 30 tablet, Rfl: 0    No Known Allergies    Blood pressure 114/80, pulse 70, temperature 96.8 °F (36 °C), height 156.2 cm (61.5\"), weight 75.8 kg (167 lb).    Physical Exam   Constitutional: She is oriented to person, place, and time. She appears well-developed and well-nourished. No distress.   HENT:   Head: Normocephalic and atraumatic.   Right Ear: Hearing " and external ear normal.   Left Ear: Hearing and external ear normal.   Nose: Nose normal.   Mouth/Throat: Oropharynx is clear and moist and mucous membranes are normal. Mucous membranes are not pale, not dry and not cyanotic. No oral lesions. No oropharyngeal exudate.   Eyes: Conjunctivae and EOM are normal. Right eye exhibits no discharge. Left eye exhibits no discharge. No scleral icterus.   Neck: Trachea normal. Neck supple. No JVD present. No edema present. No thyroid mass and no thyromegaly present.   Cardiovascular: Normal rate, regular rhythm, S2 normal and normal heart sounds. Exam reveals no gallop, no S3 and no friction rub.   No murmur heard.  Pulmonary/Chest: Effort normal and breath sounds normal. No respiratory distress. She has no wheezes. She has no rales. She exhibits no tenderness.   Abdominal: Soft. Normal appearance and bowel sounds are normal. She exhibits no distension, no ascites and no mass. There is no splenomegaly or hepatomegaly. There is tenderness in the left lower quadrant. There is no rigidity, no rebound and no guarding. No hernia.   Musculoskeletal: She exhibits no tenderness or deformity.     Vascular Status -  Her right foot exhibits no edema. Her left foot exhibits no edema.  Lymphadenopathy:     She has no cervical adenopathy.        Left: No supraclavicular adenopathy present.   Neurological: She is alert and oriented to person, place, and time. She has normal strength. No cranial nerve deficit or sensory deficit. She exhibits normal muscle tone. Coordination normal.   Skin: No rash noted. She is not diaphoretic. No cyanosis. No pallor. Nails show no clubbing.   Psychiatric: She has a normal mood and affect. Her behavior is normal. Judgment and thought content normal.   Nursing note and vitals reviewed.  Stigmata of chronic liver disease:  None.  Asterixis:  None.    Laboratory Testing:  Upon review of medical records:     Dated January 30, 2020 sodium 144 potassium 4.8  chloride 105 CO2 26.9 BUN 18 serum creatinine 0.92 glucose 85.  Calcium 8.7.  Total protein 6.6.  Albumin 4.00.  T bili <0.2 AST 24 ALT 20 alkaline phosphatase 85.  Total cholesterol 294.  Triglycerides 122.  TSH 4.660.  Vitamin D 25 hydroxy level 32.7.  WBC 5.08 hemoglobin 12.0 hematocrit 36.4 MCV 87.7 platelet count 292.     Dated June 1, 2020 WBC 4.98 hemoglobin 12.5 hematocrit 37.1 MCV 85.9 platelet count 306.    Abdominal Imaging:  Upon review of medical records:     Gallbladder ultrasound dated 2/20/2020 reveals liver parenchyma is normal in echogenicity. There are small polyps in the gallbladder wall There are no shadowing gallstones. The common duct is normal measuring 5.20 mm.     Procedures:  Upon review of medical records:     Colonoscopy dated 1/16/2014 reveals left-sided scattered diverticulosis.  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  Colon polyps.  Internal hemorrhoids.  Hypertrophic anal papilla.  Biopsy results not available.     EGD dated 3/13/2014 reveals linear erythema within the midesophagus which may represent pill esophagitis. Erythematous distal esophagitis, grade 1.  Small sliding hiatal hernia less than 3 centimeter.  Erythematous gastritis.  No Castellanos's mucosa was seen.  Subtle concentric rings were seen in the mid esophagus.  Duodenum second portion biopsy reveals benign small bowel mucosa with no diagnostic abnormality.  Antrum and body biopsy reveals chronic gastritis with lymphoid aggregate.  Negative for H. pylori.  Mid esophagus biopsy reveals reactive squamous mucosa.     Dated February 19, 2020 the patient had undergone an upper endoscopy which revealed erosive distal esophagitis-LA class B, Schatzki's ring, and subtle concentric rings involving the distal mid and proximal esophagus.  The patient had undergone dilation of esophagus to 18 mm.  She was also found to have erosive gastritis and distal esophageal diverticulum.    Dated Yari 3, 2020 the patient  "underwent an upper endoscopy which revealed: Erosive distal esophagitis.  LA class A.  Distal esophageal angulation and early stricturing.  Status post dilation to 18 mm.  Free gastroesophageal reflux.  Moderate sliding hiatal hernia around 3 to 4 cm.  Erythematous gastritis.  No definite Castellanos's.  Second portion of duodenum, biopsy revealed unremarkable duodenal mucosa with preserved villous architecture.  Antrum, body and fundus, biopsies revealed reactive gastropathy with minimal chronic inactive gastritis.  No Helicobacter organisms identified.  Negative for intestinal metaplasia, dysplasia and malignancy.  Stomach, body and fundus, polyps, biopsy revealed polypoid fragments of oxyntic mucosa with minimal chronic inactive gastritis and mild foveolar hyperplasia.  No Helicobacter organisms identified.  Negative for intestinal metaplasia, dysplasia and malignancy.    Assessment:      ICD-10-CM ICD-9-CM   1. Heartburn R12 787.1   2. Dysphagia, unspecified type R13.10 787.20   3. Diarrhea, unspecified type R19.7 787.91   4. BRBPR (bright red blood per rectum) K62.5 569.3   5. Left lower quadrant abdominal tenderness without rebound tenderness R10.814 789.64         Discussion:  1.     Plan/  Patient Instructions   1. Antireflux measures.  2. Avoidance of alcohol.  3. Low fiber diet (avoid fruits, vegetables, cereals, fiber supplements, nuts and seeds) for 5 days thereafter low-fat high-fiber diet.  4. Levaquin (levofloxacin). Take 1 tablet by mouth once a day for 7 days. Side effects were discussed.  5. Flagyl (metronidazole) tablets 250 mg. Take 1 tablet one by mouth 4 times a day for 7 days.  Side effects were discussed.  6. Avoid laxatives, enemas for next 5 days.  However, for constipation the patient may use \"stool softeners\" 1-3 per day.  7. May take probiotics such as Align.  Take one orally daily while taking antibiotics and 1-2 weeks thereafter.  8. Colonoscopy: Description of the procedure, risks " benefits alternatives and options including non-operative options were discussed with the patient in detail.  The patient understands and wishes to proceed.  This may however be postponed for about 3-4 weeks.  9. Prilosec(Omeprazole) DR capsule 40 mg. Take one capsule in the morning half an hour before eating daily.  10. Prilosec (Omeprazole) DR capsule 20 mg. Take one capsule in the evening orally daily.  11. The patient may elevate the head end of the bed by putting 5 inch blocks under the head of the bed.  12. Change Reglan to 2.5 mg orally at bedtime.   13. The patient may call back in 1 week or so regarding the progress report.         Marito Abdi MD

## 2020-07-06 NOTE — PATIENT INSTRUCTIONS
"1. Antireflux measures.  2. Avoidance of alcohol.  3. Low fiber diet (avoid fruits, vegetables, cereals, fiber supplements, nuts and seeds) for 5 days thereafter low-fat high-fiber diet.  4. Levaquin (levofloxacin). Take 1 tablet by mouth once a day for 7 days. Side effects were discussed.  5. Flagyl (metronidazole) tablets 250 mg. Take 1 tablet one by mouth 4 times a day for 7 days.  Side effects were discussed.  6. Avoid laxatives, enemas for next 5 days.  However, for constipation the patient may use \"stool softeners\" 1-3 per day.  7. May take probiotics such as Align.  Take one orally daily while taking antibiotics and 1-2 weeks thereafter.  8. Colonoscopy: Description of the procedure, risks benefits alternatives and options including non-operative options were discussed with the patient in detail.  The patient understands and wishes to proceed.  This may however be postponed for about 3-4 weeks.  9. Prilosec(Omeprazole) DR capsule 40 mg. Take one capsule in the morning half an hour before eating daily.  10. Prilosec (Omeprazole) DR capsule 20 mg. Take one capsule in the evening orally daily.  11. The patient may elevate the head end of the bed by putting 5 inch blocks under the head of the bed.  12. Change Reglan to 2.5 mg orally at bedtime.   13. The patient may call back in 1 week or so regarding the progress report.    "

## 2020-07-27 ENCOUNTER — PREP FOR SURGERY (OUTPATIENT)
Dept: OTHER | Facility: HOSPITAL | Age: 65
End: 2020-07-27

## 2020-07-27 DIAGNOSIS — K62.5 BRBPR (BRIGHT RED BLOOD PER RECTUM): ICD-10-CM

## 2020-07-27 DIAGNOSIS — Z01.812 PRE-PROCEDURE LAB EXAM: Primary | ICD-10-CM

## 2020-07-27 DIAGNOSIS — R19.7 DIARRHEA, UNSPECIFIED TYPE: Primary | ICD-10-CM

## 2020-07-27 DIAGNOSIS — R10.814 LEFT LOWER QUADRANT ABDOMINAL TENDERNESS WITHOUT REBOUND TENDERNESS: ICD-10-CM

## 2020-07-27 RX ORDER — SODIUM CHLORIDE 9 MG/ML
70 INJECTION, SOLUTION INTRAVENOUS CONTINUOUS PRN
Status: CANCELLED | OUTPATIENT
Start: 2020-07-27

## 2020-07-28 ENCOUNTER — LAB (OUTPATIENT)
Dept: LAB | Facility: HOSPITAL | Age: 65
End: 2020-07-28

## 2020-07-28 DIAGNOSIS — Z01.812 PRE-PROCEDURE LAB EXAM: ICD-10-CM

## 2020-07-28 PROBLEM — K62.5 BRBPR (BRIGHT RED BLOOD PER RECTUM): Status: ACTIVE | Noted: 2020-07-28

## 2020-07-28 PROBLEM — R10.814 LEFT LOWER QUADRANT ABDOMINAL TENDERNESS WITHOUT REBOUND TENDERNESS: Status: ACTIVE | Noted: 2020-07-28

## 2020-07-28 PROBLEM — R19.7 DIARRHEA: Status: ACTIVE | Noted: 2020-07-28

## 2020-07-28 PROCEDURE — U0002 COVID-19 LAB TEST NON-CDC: HCPCS

## 2020-07-28 PROCEDURE — U0004 COV-19 TEST NON-CDC HGH THRU: HCPCS

## 2020-07-28 PROCEDURE — C9803 HOPD COVID-19 SPEC COLLECT: HCPCS

## 2020-07-29 LAB
REF LAB TEST METHOD: NORMAL
SARS-COV-2 RNA RESP QL NAA+PROBE: NOT DETECTED

## 2020-07-31 ENCOUNTER — ANESTHESIA EVENT (OUTPATIENT)
Dept: GASTROENTEROLOGY | Facility: HOSPITAL | Age: 65
End: 2020-07-31

## 2020-07-31 ENCOUNTER — ANESTHESIA (OUTPATIENT)
Dept: GASTROENTEROLOGY | Facility: HOSPITAL | Age: 65
End: 2020-07-31

## 2020-07-31 ENCOUNTER — HOSPITAL ENCOUNTER (OUTPATIENT)
Facility: HOSPITAL | Age: 65
Setting detail: HOSPITAL OUTPATIENT SURGERY
Discharge: HOME | End: 2020-07-31
Attending: INTERNAL MEDICINE | Admitting: INTERNAL MEDICINE

## 2020-07-31 VITALS
HEART RATE: 53 BPM | HEIGHT: 61 IN | DIASTOLIC BLOOD PRESSURE: 93 MMHG | TEMPERATURE: 97 F | SYSTOLIC BLOOD PRESSURE: 128 MMHG | RESPIRATION RATE: 18 BRPM | BODY MASS INDEX: 28.32 KG/M2 | OXYGEN SATURATION: 99 % | WEIGHT: 150 LBS

## 2020-07-31 DIAGNOSIS — R10.814 LEFT LOWER QUADRANT ABDOMINAL TENDERNESS WITHOUT REBOUND TENDERNESS: ICD-10-CM

## 2020-07-31 DIAGNOSIS — R19.7 DIARRHEA, UNSPECIFIED TYPE: ICD-10-CM

## 2020-07-31 DIAGNOSIS — K62.5 BRBPR (BRIGHT RED BLOOD PER RECTUM): ICD-10-CM

## 2020-07-31 PROCEDURE — 45380 COLONOSCOPY AND BIOPSY: CPT | Performed by: INTERNAL MEDICINE

## 2020-07-31 PROCEDURE — 45385 COLONOSCOPY W/LESION REMOVAL: CPT | Performed by: INTERNAL MEDICINE

## 2020-07-31 PROCEDURE — 88305 TISSUE EXAM BY PATHOLOGIST: CPT | Performed by: INTERNAL MEDICINE

## 2020-07-31 PROCEDURE — 25010000002 PROPOFOL 200 MG/20ML EMULSION: Performed by: NURSE ANESTHETIST, CERTIFIED REGISTERED

## 2020-07-31 RX ORDER — LIDOCAINE HYDROCHLORIDE 20 MG/ML
INJECTION, SOLUTION INTRAVENOUS AS NEEDED
Status: DISCONTINUED | OUTPATIENT
Start: 2020-07-31 | End: 2020-07-31 | Stop reason: SURG

## 2020-07-31 RX ORDER — SODIUM CHLORIDE 9 MG/ML
70 INJECTION, SOLUTION INTRAVENOUS CONTINUOUS PRN
Status: DISCONTINUED | OUTPATIENT
Start: 2020-07-31 | End: 2020-07-31 | Stop reason: HOSPADM

## 2020-07-31 RX ORDER — SODIUM CHLORIDE 0.9 % (FLUSH) 0.9 %
10 SYRINGE (ML) INJECTION AS NEEDED
Status: DISCONTINUED | OUTPATIENT
Start: 2020-07-31 | End: 2020-07-31 | Stop reason: HOSPADM

## 2020-07-31 RX ORDER — PROPOFOL 10 MG/ML
INJECTION, EMULSION INTRAVENOUS AS NEEDED
Status: DISCONTINUED | OUTPATIENT
Start: 2020-07-31 | End: 2020-07-31 | Stop reason: SURG

## 2020-07-31 RX ADMIN — PROPOFOL 50 MG: 10 INJECTION, EMULSION INTRAVENOUS at 10:36

## 2020-07-31 RX ADMIN — PROPOFOL 100 MG: 10 INJECTION, EMULSION INTRAVENOUS at 10:11

## 2020-07-31 RX ADMIN — PROPOFOL 100 MG: 10 INJECTION, EMULSION INTRAVENOUS at 10:04

## 2020-07-31 RX ADMIN — PROPOFOL 100 MG: 10 INJECTION, EMULSION INTRAVENOUS at 09:48

## 2020-07-31 RX ADMIN — LIDOCAINE HYDROCHLORIDE 60 MG: 20 INJECTION, SOLUTION INTRAVENOUS at 09:48

## 2020-07-31 RX ADMIN — PROPOFOL 100 MG: 10 INJECTION, EMULSION INTRAVENOUS at 10:21

## 2020-07-31 RX ADMIN — PROPOFOL 100 MG: 10 INJECTION, EMULSION INTRAVENOUS at 09:54

## 2020-07-31 RX ADMIN — PROPOFOL 50 MG: 10 INJECTION, EMULSION INTRAVENOUS at 10:26

## 2020-07-31 RX ADMIN — PROPOFOL 50 MG: 10 INJECTION, EMULSION INTRAVENOUS at 10:31

## 2020-07-31 RX ADMIN — PROPOFOL 100 MG: 10 INJECTION, EMULSION INTRAVENOUS at 10:00

## 2020-07-31 RX ADMIN — PROPOFOL 100 MG: 10 INJECTION, EMULSION INTRAVENOUS at 10:16

## 2020-07-31 RX ADMIN — SODIUM CHLORIDE: 9 INJECTION, SOLUTION INTRAVENOUS at 10:30

## 2020-07-31 RX ADMIN — SODIUM CHLORIDE: 9 INJECTION, SOLUTION INTRAVENOUS at 09:44

## 2020-07-31 NOTE — ANESTHESIA PREPROCEDURE EVALUATION
Anesthesia Evaluation     Patient summary reviewed and Nursing notes reviewed   no history of anesthetic complications:  NPO Solid Status: > 8 hours  NPO Liquid Status: > 8 hours           Airway   Mallampati: I  TM distance: >3 FB  Neck ROM: full  no difficulty expected  Dental - normal exam     Pulmonary - negative pulmonary ROS and normal exam   Cardiovascular - normal exam    (+) hyperlipidemia,       Neuro/Psych- negative ROS  GI/Hepatic/Renal/Endo    (+)  hiatal hernia, GERD, GI bleeding ,     Musculoskeletal (-) negative ROS    Abdominal    Substance History - negative use     OB/GYN negative ob/gyn ROS         Other - negative ROS                       Anesthesia Plan    ASA 2     MAC     intravenous induction     Anesthetic plan, all risks, benefits, and alternatives have been provided, discussed and informed consent has been obtained with: patient.

## 2020-08-03 ENCOUNTER — APPOINTMENT (OUTPATIENT)
Dept: LAB | Facility: HOSPITAL | Age: 65
End: 2020-08-03

## 2020-08-05 LAB
LAB AP CASE REPORT: NORMAL
PATH REPORT.FINAL DX SPEC: NORMAL

## 2020-09-03 ENCOUNTER — OFFICE VISIT (OUTPATIENT)
Dept: GASTROENTEROLOGY | Facility: CLINIC | Age: 65
End: 2020-09-03

## 2020-09-03 VITALS
TEMPERATURE: 97.5 F | WEIGHT: 153 LBS | HEIGHT: 61 IN | RESPIRATION RATE: 16 BRPM | SYSTOLIC BLOOD PRESSURE: 131 MMHG | BODY MASS INDEX: 28.89 KG/M2 | HEART RATE: 63 BPM | DIASTOLIC BLOOD PRESSURE: 77 MMHG

## 2020-09-03 DIAGNOSIS — R13.19 ESOPHAGEAL DYSPHAGIA: ICD-10-CM

## 2020-09-03 DIAGNOSIS — D12.6 ADENOMATOUS POLYP OF COLON, UNSPECIFIED PART OF COLON: ICD-10-CM

## 2020-09-03 DIAGNOSIS — K22.2 ESOPHAGEAL STRICTURE: ICD-10-CM

## 2020-09-03 DIAGNOSIS — K22.10 EROSIVE ESOPHAGITIS: Primary | ICD-10-CM

## 2020-09-03 DIAGNOSIS — D36.9 TUBULOVILLOUS ADENOMA: ICD-10-CM

## 2020-09-03 PROCEDURE — 99214 OFFICE O/P EST MOD 30 MIN: CPT | Performed by: INTERNAL MEDICINE

## 2020-09-03 NOTE — PROGRESS NOTES
"     Follow Up Note     Date: 2020   Patient Name: Krystina Henley  MRN: [id number]  : 1955     Referring Physician: Tre Lindquist MD    Chief Complaint:    Chief Complaint   Patient presents with   • Follow-up   • Rectal Bleeding       Interval History:   9/3/2020  Krystina Henley is a 64 y.o. female who is here today for follow up for after procedure.  Denies any rectal bleeding since the procedure.   Her bowel movements have regularized now and diarrhea resolved.  She is here to discuss the pathology report and biopsy findings    2020  The patient has a history of reflux off and on for the last several years.  The reflux is rather severe.  Symptoms are described as retrosternal burning sensation, and indigestion.  There is history of occasional regurgitative symptoms.  Frequency being several times per week.  The symptoms are worse at night.  The patient takes acid suppressive therapy with significant breakthrough symptoms.  The patient denies significant consumption of soda beverages, coffee or tea.  She denies eating chocolates.  The patient is a non-smoker.  She however drinks wine perhaps 2 drinks a week.  There is history of some recent weight gain of about 5 pounds over the last 1 month or so.  Her swallowing has significantly improved.     The patient has history of diarrhea off-and-on for the last several months.  Diarrhea is episodic and occurs perhaps couple of times a week.  Severity is mild, frequency of bowel movements being 2-3 times a day.  The stools are described as loose.  Occasionally, there is nocturnal element of diarrhea. The diarrhea is not associated with tenesmus.  The patient has been having recurrent bright red blood per rectum.  She has also noticed passage of \"clots\".  There is no associated dizziness.  There is some soreness at the rectal area.  There is no history of hematemesis or melena.  Her previous colonoscopy was in .     There is no history " of liver or pancreatic disease.  There is no family history of colon cancer, inflammatory bowel disease, celiac disease or chronic liver disease.    Subjective      Past Medical History:   Past Medical History:   Diagnosis Date   • Anemia    • Anxiety    • Colon polyp 01/16/2014   • Depression    • Diverticulosis    • Elevated cholesterol    • Fibroid    • GERD (gastroesophageal reflux disease)    • Heartburn    • Hiatal hernia    • History of transfusion     No past rxns   • Hyperlipidemia    • Osteoarthritis     generalized     Past Surgical History:   Past Surgical History:   Procedure Laterality Date   • BREAST BIOPSY     • COLONOSCOPY  01/16/2014   • COLONOSCOPY N/A 7/31/2020    Procedure: COLONOSCOPY WITH BIOPSY AND HOT SNARE POLYPECTOMY;  Surgeon: Juan Coreas MD;  Location: [facility] ENDOSCOPY;  Service: Gastroenterology;  Laterality: N/A;   • ENDOSCOPY N/A 2/19/2020    Procedure: ESOPHAGOGASTRODUODENOSCOPY;  Surgeon: Marito Abdi MD;  Location: [facility] ENDOSCOPY;  Service: Gastroenterology;  Laterality: N/A;   • ENDOSCOPY N/A 6/3/2020    Procedure: ESOPHAGOGASTRODUODENOSCOPY;  Surgeon: Marito Abdi MD;  Location: [facility] ENDOSCOPY;  Service: Gastroenterology;  Laterality: N/A;   • UPPER GASTROINTESTINAL ENDOSCOPY  03/13/2014       Family History:   Family History   Problem Relation Age of Onset   • Lung cancer Mother    • Lung cancer Father    • Prostate cancer Brother         x 3 brothers   • Heart attack Brother         x 2 brothers    • Colon cancer Neg Hx    • Cirrhosis Neg Hx    • Liver disease Neg Hx    • Liver cancer Neg Hx    • Crohn's disease Neg Hx        Social History:   Social History     Socioeconomic History   • Marital status:      Spouse name: Not on file   • Number of children: Not on file   • Years of education: Not on file   • Highest education level: Not on file   Tobacco Use   • Smoking status: Former Smoker     Packs/day: 1.00     Years: 10.00     Pack years: 10.00  "    Types: Cigarettes     Last attempt to quit: 2016     Years since quittin.1   • Smokeless tobacco: Never Used   Substance and Sexual Activity   • Alcohol use: Yes     Comment: social   • Drug use: No   • Sexual activity: Defer     Partners: Male     Birth control/protection: Post-menopausal       Medications:     Current Outpatient Medications:   •  omeprazole (priLOSEC) 20 MG capsule, Take 1 capsule by mouth Every Night. (Patient taking differently: Take 20 mg by mouth 2 (two) times a day.), Disp: 30 capsule, Rfl: 1  •  rosuvastatin (CRESTOR) 10 MG tablet, TAKE 1 TABLET BY MOUTH ONE TIME A DAY  (Patient taking differently: Take 10 mg by mouth Every Night.), Disp: 30 tablet, Rfl: 0    Allergies:   No Known Allergies    Review of Systems:   Review of Systems   Constitutional: Negative for appetite change, fatigue and unexpected weight loss.   HENT: Negative for trouble swallowing.    Gastrointestinal: Positive for anal bleeding. Negative for abdominal distention, abdominal pain, blood in stool, constipation, diarrhea, nausea, rectal pain, vomiting, GERD and indigestion.       The following portions of the patient's history were reviewed and updated as appropriate: allergies, current medications, past family history, past medical history, past social history, past surgical history and problem list.    Objective     Physical Exam:  Vital Signs:   Vitals:    20 1340   BP: 131/77   Pulse: 63   Resp: 16   Temp: 97.5 °F (36.4 °C)   Weight: 69.4 kg (153 lb)   Height: 154.9 cm (61\")       Physical Exam   Constitutional: She is oriented to person, place, and time. She appears well-developed and well-nourished.   HENT:   Mouth/Throat: Oropharynx is clear and moist.   Eyes: Conjunctivae and EOM are normal.   Neck: Neck supple.   Cardiovascular: Normal rate and regular rhythm.   No murmur heard.  Pulmonary/Chest: Effort normal and breath sounds normal. No respiratory distress.   Abdominal: Soft. Bowel sounds " are normal. She exhibits no distension and no mass. There is no tenderness. No hernia.   Lymphadenopathy:     She has no cervical adenopathy.   Neurological: She is alert and oriented to person, place, and time. No sensory deficit.   Skin: Skin is warm and dry.   Nursing note and vitals reviewed.      Results Review:   I reviewed the patient's new clinical results.    Admission on 07/31/2020, Discharged on 07/31/2020   Component Date Value Ref Range Status   • Case Report 07/31/2020    Final                    Value:Surgical Pathology Report                         Case: [id number]                                  Authorizing Provider:  Juan Coreas MD  Collected:           07/31/2020 09:56 AM          Ordering Location:     [facility]    Received:            07/31/2020 02:00 PM                                 [facility]                                                                    Pathologist:           Dina Wilson MD                                                        Specimens:   1) - Large Intestine, TERMINAL ILEUM                                                                2) - Large Intestine, Right / Ascending Colon, polyp                                                3) - Large Intestine, hepatic flexure                                                               4) - Large Intestine, hepatic flexure #2 polyp                                                      5) - Large Intestine, random                                                                                                  6) - Large Intestine, Rectum                                                              • Final Diagnosis 07/31/2020    Final                    Value:This result contains rich text formatting which cannot be displayed here.   Lab on 07/28/2020   Component Date Value Ref Range Status   • Reference Lab Report 07/28/2020 See attachment   Final   • COVID19 07/28/2020 Not Detected  Not  Detected - Ref. Range Final      No radiology results for the last 90 days.    Assessment / Plan      1.  Erosive esophagitis with gastroesophageal reflux disease  Her reflux symptoms have improved now with the Prilosec 20 mils p.o. twice daily  To continue the PPI as before    2.  Dysphagia  3  Esophageal stricture  EGD done in June 2020 revealed lower esophagus peptic stricture which was dilated to 18 mm with a savory dilator.    Swallowing better now after she had a esophageal dilatation earlier this year.  We will monitor for now    4 Chronic diarrhea  Bowel movements normal now.  Will monitor    5.  Tubulovillous adenoma with high-grade dysplasia  She had a colonoscopy done on 7/31/2020.  Colonoscopy revealed 8 to 10 mm polyp in the ascending colon and 2 more in the hepatic flexure.   2 of these polyps were tubular adenoma and 1 of them sessile serrated adenoma without any dysplasia  She had a large 25 to 30 mm pedunculated lesion in the rectosigmoid junction.  Which was removed with a hot snare piecemeal.  Pathology consistent with a tubulovillous adenoma with high-grade dysplasia.  Polyp stalk did not reveal any high-grade dysplasia.   She needs a surveillance colonoscopy in 6 months time    5.  Colonic tubular adenoma and sessile serrated adenoma  As above    6.  Left colon diverticulosis  High fiber diet discussed      Prior work-up  Laboratory Testing:  Upon review of medical records:     Dated January 30, 2020 sodium 144 potassium 4.8 chloride 105 CO2 26.9 BUN 18 serum creatinine 0.92 glucose 85.  Calcium 8.7.  Total protein 6.6.  Albumin 4.00.  T bili <0.2 AST 24 ALT 20 alkaline phosphatase 85.  Total cholesterol 294.  Triglycerides 122.  TSH 4.660.  Vitamin D 25 hydroxy level 32.7.  WBC 5.08 hemoglobin 12.0 hematocrit 36.4 MCV 87.7 platelet count 292.     Dated June 1, 2020 WBC 4.98 hemoglobin 12.5 hematocrit 37.1 MCV 85.9 platelet count 306.     Abdominal Imaging:  Upon review of medical  records:     Gallbladder ultrasound dated 2/20/2020 reveals liver parenchyma is normal in echogenicity. There are small polyps in the gallbladder wall There are no shadowing gallstones. The common duct is normal measuring 5.20 mm.     Procedures:  Upon review of medical records:     Colonoscopy dated 1/16/2014 reveals left-sided scattered diverticulosis.  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  Colon polyps.  Internal hemorrhoids.  Hypertrophic anal papilla.  Biopsy results not available.     EGD dated 3/13/2014 reveals linear erythema within the midesophagus which may represent pill esophagitis. Erythematous distal esophagitis, grade 1.  Small sliding hiatal hernia less than 3 centimeter.  Erythematous gastritis.  No Castellanos's mucosa was seen.  Subtle concentric rings were seen in the mid esophagus.  Duodenum second portion biopsy reveals benign small bowel mucosa with no diagnostic abnormality.  Antrum and body biopsy reveals chronic gastritis with lymphoid aggregate.  Negative for H. pylori.  Mid esophagus biopsy reveals reactive squamous mucosa.     Dated February 19, 2020 the patient had undergone an upper endoscopy which revealed erosive distal esophagitis-LA class B, Schatzki's ring, and subtle concentric rings involving the distal mid and proximal esophagus.  The patient had undergone dilation of esophagus to 18 mm.  She was also found to have erosive gastritis and distal esophageal diverticulum.     Dated Yari 3, 2020 the patient underwent an upper endoscopy which revealed: Erosive distal esophagitis.  LA class A.  Distal esophageal angulation and early stricturing.  Status post dilation to 18 mm.  Free gastroesophageal reflux.  Moderate sliding hiatal hernia around 3 to 4 cm.  Erythematous gastritis.  No definite Castellanos's.  Second portion of duodenum, biopsy revealed unremarkable duodenal mucosa with preserved villous architecture.  Antrum, body and fundus, biopsies revealed  reactive gastropathy with minimal chronic inactive gastritis.  No Helicobacter organisms identified.  Negative for intestinal metaplasia, dysplasia and malignancy.  Stomach, body and fundus, polyps, biopsy revealed polypoid fragments of oxyntic mucosa with minimal chronic inactive gastritis and mild foveolar hyperplasia.  No Helicobacter organisms identified.  Negative for intestinal metaplasia, dysplasia and malignancy.     Follow Up:   Return in about 6 months (around 3/3/2021).    Juan Coreas MD  [facility] [place]  9/3/2020  14:00     Please note that portions of this note may have been completed with a voice recognition program.

## 2020-09-23 DIAGNOSIS — K29.60 EROSIVE GASTRITIS: ICD-10-CM

## 2020-09-23 DIAGNOSIS — R12 HEARTBURN: ICD-10-CM

## 2020-09-23 DIAGNOSIS — K22.10 EROSIVE ESOPHAGITIS: ICD-10-CM

## 2020-09-23 RX ORDER — OMEPRAZOLE 40 MG/1
CAPSULE, DELAYED RELEASE ORAL
Qty: 60 CAPSULE | Refills: 0 | OUTPATIENT
Start: 2020-09-23

## 2020-09-24 DIAGNOSIS — K22.10 EROSIVE ESOPHAGITIS: ICD-10-CM

## 2020-09-24 DIAGNOSIS — R12 HEARTBURN: ICD-10-CM

## 2020-09-24 DIAGNOSIS — K29.60 EROSIVE GASTRITIS: ICD-10-CM

## 2020-09-28 RX ORDER — OMEPRAZOLE 40 MG/1
CAPSULE, DELAYED RELEASE ORAL
Qty: 60 CAPSULE | Refills: 5 | Status: SHIPPED | OUTPATIENT
Start: 2020-09-28 | End: 2021-03-09 | Stop reason: ALTCHOICE

## 2021-01-06 DIAGNOSIS — D12.6 ADENOMATOUS POLYP OF COLON, UNSPECIFIED PART OF COLON: Primary | ICD-10-CM

## 2021-01-06 DIAGNOSIS — Z01.812 PRE-PROCEDURE LAB EXAM: Primary | ICD-10-CM

## 2021-01-06 RX ORDER — SODIUM CHLORIDE 9 MG/ML
70 INJECTION, SOLUTION INTRAVENOUS CONTINUOUS PRN
Status: CANCELLED | OUTPATIENT
Start: 2021-01-06

## 2021-01-06 RX ORDER — BISACODYL 5 MG/1
20 TABLET, DELAYED RELEASE ORAL ONCE
Qty: 4 TABLET | Refills: 0 | Status: SHIPPED | OUTPATIENT
Start: 2021-01-06 | End: 2021-01-06

## 2021-01-08 PROBLEM — D12.6 ADENOMATOUS POLYP OF COLON: Status: ACTIVE | Noted: 2021-01-08

## 2021-01-21 ENCOUNTER — LAB (OUTPATIENT)
Dept: LAB | Facility: HOSPITAL | Age: 66
End: 2021-01-21

## 2021-01-21 DIAGNOSIS — Z01.812 PRE-PROCEDURE LAB EXAM: ICD-10-CM

## 2021-01-21 PROCEDURE — U0004 COV-19 TEST NON-CDC HGH THRU: HCPCS

## 2021-01-21 PROCEDURE — C9803 HOPD COVID-19 SPEC COLLECT: HCPCS

## 2021-01-22 LAB — SARS-COV-2 RNA RESP QL NAA+PROBE: NOT DETECTED

## 2021-01-25 ENCOUNTER — HOSPITAL ENCOUNTER (OUTPATIENT)
Facility: HOSPITAL | Age: 66
Setting detail: HOSPITAL OUTPATIENT SURGERY
Discharge: HOME | End: 2021-01-25
Attending: INTERNAL MEDICINE | Admitting: INTERNAL MEDICINE

## 2021-01-25 ENCOUNTER — ANESTHESIA (OUTPATIENT)
Dept: GASTROENTEROLOGY | Facility: HOSPITAL | Age: 66
End: 2021-01-25

## 2021-01-25 ENCOUNTER — ANESTHESIA EVENT (OUTPATIENT)
Dept: GASTROENTEROLOGY | Facility: HOSPITAL | Age: 66
End: 2021-01-25

## 2021-01-25 VITALS
TEMPERATURE: 97.6 F | OXYGEN SATURATION: 98 % | HEIGHT: 62 IN | RESPIRATION RATE: 16 BRPM | WEIGHT: 155 LBS | SYSTOLIC BLOOD PRESSURE: 109 MMHG | HEART RATE: 55 BPM | BODY MASS INDEX: 28.52 KG/M2 | DIASTOLIC BLOOD PRESSURE: 70 MMHG

## 2021-01-25 DIAGNOSIS — D12.6 ADENOMATOUS POLYP OF COLON, UNSPECIFIED PART OF COLON: ICD-10-CM

## 2021-01-25 PROCEDURE — 25010000002 PROPOFOL 200 MG/20ML EMULSION: Performed by: NURSE ANESTHETIST, CERTIFIED REGISTERED

## 2021-01-25 PROCEDURE — 45380 COLONOSCOPY AND BIOPSY: CPT | Performed by: INTERNAL MEDICINE

## 2021-01-25 RX ORDER — SODIUM CHLORIDE 0.9 % (FLUSH) 0.9 %
10 SYRINGE (ML) INJECTION AS NEEDED
Status: DISCONTINUED | OUTPATIENT
Start: 2021-01-25 | End: 2021-01-25 | Stop reason: HOSPADM

## 2021-01-25 RX ORDER — MAGNESIUM HYDROXIDE 1200 MG/15ML
LIQUID ORAL AS NEEDED
Status: DISCONTINUED | OUTPATIENT
Start: 2021-01-25 | End: 2021-01-25 | Stop reason: HOSPADM

## 2021-01-25 RX ORDER — SODIUM CHLORIDE 9 MG/ML
70 INJECTION, SOLUTION INTRAVENOUS CONTINUOUS PRN
Status: DISCONTINUED | OUTPATIENT
Start: 2021-01-25 | End: 2021-01-25 | Stop reason: HOSPADM

## 2021-01-25 RX ORDER — PROPOFOL 10 MG/ML
INJECTION, EMULSION INTRAVENOUS AS NEEDED
Status: DISCONTINUED | OUTPATIENT
Start: 2021-01-25 | End: 2021-01-25 | Stop reason: SURG

## 2021-01-25 RX ORDER — LIDOCAINE HYDROCHLORIDE 20 MG/ML
INJECTION, SOLUTION INTRAVENOUS AS NEEDED
Status: DISCONTINUED | OUTPATIENT
Start: 2021-01-25 | End: 2021-01-25 | Stop reason: SURG

## 2021-01-25 RX ADMIN — PROPOFOL 50 MG: 10 INJECTION, EMULSION INTRAVENOUS at 10:22

## 2021-01-25 RX ADMIN — PROPOFOL 50 MG: 10 INJECTION, EMULSION INTRAVENOUS at 10:19

## 2021-01-25 RX ADMIN — SODIUM CHLORIDE 70 ML/HR: 9 INJECTION, SOLUTION INTRAVENOUS at 09:11

## 2021-01-25 RX ADMIN — PROPOFOL 50 MG: 10 INJECTION, EMULSION INTRAVENOUS at 10:16

## 2021-01-25 RX ADMIN — PROPOFOL 80 MG: 10 INJECTION, EMULSION INTRAVENOUS at 10:10

## 2021-01-25 RX ADMIN — LIDOCAINE HYDROCHLORIDE 60 MG: 20 INJECTION, SOLUTION INTRAVENOUS at 10:10

## 2021-01-25 NOTE — DISCHARGE INSTRUCTIONS
Please follow all post op instructions and follow up appointment time from your physician's office included in your discharge packet.    REST TODAY    No pushing, pulling, tugging,  heavy lifting, or strenuous activity.  No major decision making, driving, or drinking alcoholic beverages for 24 hours. ( due to the medications you have  received)  Always use good hand hygiene/washing techniques.  NO driving while taking pain medications.    To assist you in voiding:  Drink plenty of fluids  Listen to running water while attempting to void.    If you are unable to urinate and you have an uncomfortable urge to void or it has been   6 hours since you were discharged, return to the Emergency Room    - Discharge patient to home (ambulatory).   - High fiber diet.   - Continue present medications.   - Await pathology results.   - Repeat colonoscopy in 1 year for surveillance due to large adavanced polyp removed piecemeal.   - Return to GI office in 8 weeks.

## 2021-01-25 NOTE — ANESTHESIA POSTPROCEDURE EVALUATION
Patient: Krystina Henley    Procedure Summary     Date: 01/25/21 Room / Location: [facility] ENDOSCOPY 1 / [facility] ENDOSCOPY    Anesthesia Start: 1008 Anesthesia Stop: 1033    Procedure: COLONOSCOPY WITH COLD BIOPSY POLYPECTOMY (N/A Anus) Diagnosis:       Adenomatous polyp of colon, unspecified part of colon      (Adenomatous polyp of colon, unspecified part of colon [D12.6])    Surgeon: Juan Coreas MD Provider: Michael Shook CRNA    Anesthesia Type: MAC ASA Status: 2          Anesthesia Type: MAC    Vitals  Vitals Value Taken Time   /70 01/25/21 1107   Temp 97.6 °F (36.4 °C) 01/25/21 1035   Pulse 55 01/25/21 1107   Resp 16 01/25/21 1107   SpO2 98 % 01/25/21 1107           Post Anesthesia Care and Evaluation    Patient location during evaluation: PHASE II  Patient participation: complete - patient participated  Level of consciousness: awake and sleepy but conscious  Pain management: adequate  Airway patency: patent  Anesthetic complications: No anesthetic complications  PONV Status: none  Cardiovascular status: acceptable and hemodynamically stable  Respiratory status: acceptable, room air, nonlabored ventilation and spontaneous ventilation  Hydration status: acceptable

## 2021-01-25 NOTE — ANESTHESIA POSTPROCEDURE EVALUATION
Patient: Krystina Henley    Procedure Summary     Date: 01/25/21 Room / Location: [facility] ENDOSCOPY 1 / [facility] ENDOSCOPY    Anesthesia Start: 1008 Anesthesia Stop: 1033    Procedure: COLONOSCOPY WITH COLD BIOPSY POLYPECTOMY (N/A Anus) Diagnosis:       Adenomatous polyp of colon, unspecified part of colon      (Adenomatous polyp of colon, unspecified part of colon [D12.6])    Surgeon: Juan Coreas MD Provider: Michael Shook CRNA    Anesthesia Type: MAC ASA Status: 2          Anesthesia Type: MAC    Vitals  Vitals Value Taken Time   /70 01/25/21 1107   Temp 97.6 °F (36.4 °C) 01/25/21 1035   Pulse 55 01/25/21 1107   Resp 16 01/25/21 1107   SpO2 98 % 01/25/21 1107           Post Anesthesia Care and Evaluation    Patient location during evaluation: PHASE II  Patient participation: complete - patient participated  Level of consciousness: awake and alert  Pain score: 0  Pain management: satisfactory to patient  Airway patency: patent  Anesthetic complications: No anesthetic complications  PONV Status: none  Cardiovascular status: acceptable and stable  Respiratory status: acceptable  Hydration status: acceptable

## 2021-01-25 NOTE — ANESTHESIA PREPROCEDURE EVALUATION
Anesthesia Evaluation     Patient summary reviewed and Nursing notes reviewed   no history of anesthetic complications:  NPO Solid Status: > 8 hours  NPO Liquid Status: > 8 hours           Airway   Mallampati: II  TM distance: >3 FB  Neck ROM: full  No difficulty expected  Dental - normal exam     Pulmonary - normal exam   (+) a smoker Former,     ROS comment: Former 10 pack year smoker - QUIT 2016  Cardiovascular - normal exam  Exercise tolerance: good (4-7 METS)    (+) hyperlipidemia,     ROS comment: NO ECG for review    Neuro/Psych  (+) psychiatric history Anxiety and Depression,     GI/Hepatic/Renal/Endo    (+)  hiatal hernia, GERD, GI bleeding ,     Musculoskeletal     Abdominal  - normal exam   Substance History   (+) alcohol use,   (-) drug use     OB/GYN negative ob/gyn ROS   (-)  Pregnant    Comment: Post menopause      Other   arthritis,      ROS/Med Hx Other: Epigastric pain  Colon cancer screening  Arthritis  Anxiety  Depression  Hyperlipidemia  Urinary incontinence  Vitamin D deficiency, unspecified   Gastroesophageal reflux disease without esophagitis  Dysphagia  Melena  Nausea  Heartburn  Diarrhea  BRBPR (bright red blood per rectum)  Left lower quadrant abdominal tenderness without rebound tenderness  Adenomatous polyp of colon                      Anesthesia Plan    ASA 2     MAC   (Patient advised that intravenous sedation would be utilized as primary anesthetic technique. Every effort will be made to make sure patient is comfortable. Patient advised that they may experience recall of events of the procedure. Patient verbalized understanding and agreed to plan. )  intravenous induction     Anesthetic plan, all risks, benefits, and alternatives have been provided, discussed and informed consent has been obtained with: patient.    Plan discussed with CRNA.

## 2021-01-25 NOTE — H&P
[facility]  HISTORY AND PHYSICAL    Patient Name: Krystina Henley  : 1955  MRN: [id number]    Chief Complaint:   For surveillance colonoscopy    History Of Presenting Illness:    25-30 mm advanced polyp recto sigmoid removed piecemeal  Path TA with high grade dysplasia  For surveillance colonoscopy    Past Medical History:   Diagnosis Date   • Anemia    • Anxiety    • Colon polyp 2014   • Depression    • Diverticulosis    • Elevated cholesterol    • Fibroid     uterine   • GERD (gastroesophageal reflux disease)    • Heartburn    • Hiatal hernia    • History of transfusion     no adverse reactions   • Hyperlipidemia    • Osteoarthritis     generalized   • Tinnitus    • Wears glasses     reading only       Past Surgical History:   Procedure Laterality Date   • BREAST BIOPSY     • COLONOSCOPY  2014   • COLONOSCOPY N/A 2020    Procedure: COLONOSCOPY WITH BIOPSY AND HOT SNARE POLYPECTOMY;  Surgeon: Juan Coreas MD;  Location: [facility] ENDOSCOPY;  Service: Gastroenterology;  Laterality: N/A;   • ENDOSCOPY N/A 2020    Procedure: ESOPHAGOGASTRODUODENOSCOPY;  Surgeon: Marito Abdi MD;  Location: [facility] ENDOSCOPY;  Service: Gastroenterology;  Laterality: N/A;   • ENDOSCOPY N/A 6/3/2020    Procedure: ESOPHAGOGASTRODUODENOSCOPY;  Surgeon: Marito Abdi MD;  Location: [facility] ENDOSCOPY;  Service: Gastroenterology;  Laterality: N/A;   • HYSTERECTOMY      still has one ovary on left side   • UPPER GASTROINTESTINAL ENDOSCOPY  2014   • VAGINAL DELIVERY      x3   • WISDOM TOOTH EXTRACTION         Social History     Socioeconomic History   • Marital status:      Spouse name: Not on file   • Number of children: Not on file   • Years of education: Not on file   • Highest education level: Not on file   Tobacco Use   • Smoking status: Former Smoker     Packs/day: 1.00     Years: 10.00     Pack years: 10.00     Types: Cigarettes     Quit date: 2016     Years  since quittin.5   • Smokeless tobacco: Never Used   Substance and Sexual Activity   • Alcohol use: Yes     Comment: social   • Drug use: No   • Sexual activity: Defer     Partners: Male     Birth control/protection: Post-menopausal       Family History   Problem Relation Age of Onset   • Lung cancer Mother    • Lung cancer Father    • Prostate cancer Brother         x 3 brothers   • Heart attack Brother         x 2 brothers    • Colon cancer Neg Hx    • Cirrhosis Neg Hx    • Liver disease Neg Hx    • Liver cancer Neg Hx    • Crohn's disease Neg Hx        Prior to Admission Medications:  Medications Prior to Admission   Medication Sig Dispense Refill Last Dose   • omeprazole (priLOSEC) 40 MG capsule TAKE 1 CAPSULE BY MOUTH IN THE MORNING 30 minutes before breakfast and 1 capsule by mouth in the evening 60 capsule 5 Past Week at Unknown time   • rosuvastatin (CRESTOR) 10 MG tablet TAKE 1 TABLET BY MOUTH ONE TIME A DAY  (Patient taking differently: Take 10 mg by mouth Every Night.) 30 tablet 0 Past Month at Unknown time       Allergies:  No Known Allergies     Vitals: Temp:  [97.1 °F (36.2 °C)] 97.1 °F (36.2 °C)  Heart Rate:  [59] 59  Resp:  [16] 16  BP: (112)/(73) 112/73    Review Of Systems:  Constitutional:  Negative for chills, fever, and unexpected weight change.  Respiratory:  Negative for cough, chest tightness, shortness of breath, and wheezing.  Cardiovascular:  Negative for chest pain, palpitations, and leg swelling.  Gastrointestinal:  Negative for abdominal distention, abdominal pain, Nausea, vomiting.  Neurological:  Negative for Weakness, numbness, and headaches.     Physical Exam:    General Appearance:  Alert, cooperative, in no acute distress.   Lungs:   Clear to auscultation, respirations regular, even and                 unlabored.   Heart:  Regular rhythm and normal rate.   Abdomen:   Normal bowel sounds, no masses, no organomegaly. Soft, non-tender, non-distended   Neurologic: Alert and  oriented x 3. Moves all four limbs equally       Plan: COLONOSCOPY (N/A)     Juan Coreas MD  1/25/2021

## 2021-01-28 LAB
LAB AP CASE REPORT: NORMAL
PATH REPORT.FINAL DX SPEC: NORMAL

## 2021-02-19 ENCOUNTER — TELEPHONE (OUTPATIENT)
Dept: INTERNAL MEDICINE | Facility: CLINIC | Age: 66
End: 2021-02-19

## 2021-02-23 DIAGNOSIS — E78.2 MIXED HYPERLIPIDEMIA: ICD-10-CM

## 2021-02-23 DIAGNOSIS — I20.8 ANGINA OF EFFORT (HCC): ICD-10-CM

## 2021-02-23 DIAGNOSIS — Z11.59 ENCOUNTER FOR HEPATITIS C SCREENING TEST FOR LOW RISK PATIENT: ICD-10-CM

## 2021-02-23 DIAGNOSIS — D64.9 ANEMIA, UNSPECIFIED TYPE: ICD-10-CM

## 2021-02-23 DIAGNOSIS — I10 ESSENTIAL HYPERTENSION: ICD-10-CM

## 2021-02-23 DIAGNOSIS — Z00.00 WELLNESS EXAMINATION: Primary | ICD-10-CM

## 2021-02-25 ENCOUNTER — OFFICE VISIT (OUTPATIENT)
Dept: INTERNAL MEDICINE | Facility: CLINIC | Age: 66
End: 2021-02-25

## 2021-02-25 VITALS
HEIGHT: 62 IN | SYSTOLIC BLOOD PRESSURE: 134 MMHG | WEIGHT: 157 LBS | BODY MASS INDEX: 28.89 KG/M2 | TEMPERATURE: 97.3 F | DIASTOLIC BLOOD PRESSURE: 79 MMHG | OXYGEN SATURATION: 99 % | HEART RATE: 70 BPM

## 2021-02-25 DIAGNOSIS — D12.6 ADENOMATOUS POLYP OF COLON, UNSPECIFIED PART OF COLON: ICD-10-CM

## 2021-02-25 DIAGNOSIS — Z12.31 ENCOUNTER FOR SCREENING MAMMOGRAM FOR MALIGNANT NEOPLASM OF BREAST: Primary | ICD-10-CM

## 2021-02-25 DIAGNOSIS — R11.0 NAUSEA: ICD-10-CM

## 2021-02-25 DIAGNOSIS — D64.9 ANEMIA, UNSPECIFIED TYPE: ICD-10-CM

## 2021-02-25 DIAGNOSIS — E78.5 HYPERLIPIDEMIA, UNSPECIFIED HYPERLIPIDEMIA TYPE: ICD-10-CM

## 2021-02-25 DIAGNOSIS — R10.13 EPIGASTRIC PAIN: Chronic | ICD-10-CM

## 2021-02-25 DIAGNOSIS — K21.9 GASTROESOPHAGEAL REFLUX DISEASE WITHOUT ESOPHAGITIS: ICD-10-CM

## 2021-02-25 DIAGNOSIS — R12 HEARTBURN: ICD-10-CM

## 2021-02-25 DIAGNOSIS — R07.9 CHEST PAIN, UNSPECIFIED TYPE: ICD-10-CM

## 2021-02-25 PROBLEM — K62.5 BRIGHT RED BLOOD PER RECTUM: Status: RESOLVED | Noted: 2018-07-16 | Resolved: 2021-02-25

## 2021-02-25 PROBLEM — K92.1 MELENA: Status: RESOLVED | Noted: 2020-05-29 | Resolved: 2021-02-25

## 2021-02-25 PROBLEM — K62.5 BRBPR (BRIGHT RED BLOOD PER RECTUM): Status: RESOLVED | Noted: 2020-07-28 | Resolved: 2021-02-25

## 2021-02-25 PROBLEM — R10.814 LEFT LOWER QUADRANT ABDOMINAL TENDERNESS WITHOUT REBOUND TENDERNESS: Status: RESOLVED | Noted: 2020-07-28 | Resolved: 2021-02-25

## 2021-02-25 PROBLEM — R13.10 DYSPHAGIA: Status: RESOLVED | Noted: 2020-02-07 | Resolved: 2021-02-25

## 2021-02-25 PROBLEM — Z12.11 COLON CANCER SCREENING: Status: RESOLVED | Noted: 2018-07-17 | Resolved: 2021-02-25

## 2021-02-25 PROBLEM — R19.7 DIARRHEA: Status: RESOLVED | Noted: 2020-07-28 | Resolved: 2021-02-25

## 2021-02-25 LAB
ALBUMIN SERPL-MCNC: 3.9 G/DL (ref 3.5–5.2)
ALBUMIN/GLOB SERPL: 1.6 G/DL
ALP SERPL-CCNC: 73 U/L (ref 39–117)
ALT SERPL-CCNC: 39 U/L (ref 1–33)
AST SERPL-CCNC: 47 U/L (ref 1–32)
BASOPHILS # BLD AUTO: 0.06 10*3/MM3 (ref 0–0.2)
BASOPHILS NFR BLD AUTO: 1.2 % (ref 0–1.5)
BILIRUB SERPL-MCNC: 0.2 MG/DL (ref 0–1.2)
BUN SERPL-MCNC: 27 MG/DL (ref 8–23)
BUN/CREAT SERPL: 37.5 (ref 7–25)
CALCIUM SERPL-MCNC: 9 MG/DL (ref 8.6–10.5)
CHLORIDE SERPL-SCNC: 107 MMOL/L (ref 98–107)
CHOLEST SERPL-MCNC: 320 MG/DL (ref 0–200)
CK SERPL-CCNC: 164 U/L (ref 20–180)
CO2 SERPL-SCNC: 27.9 MMOL/L (ref 22–29)
CREAT SERPL-MCNC: 0.72 MG/DL (ref 0.57–1)
EOSINOPHIL # BLD AUTO: 0.2 10*3/MM3 (ref 0–0.4)
EOSINOPHIL NFR BLD AUTO: 4.1 % (ref 0.3–6.2)
ERYTHROCYTE [DISTWIDTH] IN BLOOD BY AUTOMATED COUNT: 14.6 % (ref 12.3–15.4)
GLOBULIN SER CALC-MCNC: 2.4 GM/DL
GLUCOSE SERPL-MCNC: 86 MG/DL (ref 65–99)
HCT VFR BLD AUTO: 33.8 % (ref 34–46.6)
HCV AB S/CO SERPL IA: <0.1 S/CO RATIO (ref 0–0.9)
HDLC SERPL-MCNC: 83 MG/DL (ref 40–60)
HGB BLD-MCNC: 11.4 G/DL (ref 12–15.9)
IMM GRANULOCYTES # BLD AUTO: 0.01 10*3/MM3 (ref 0–0.05)
IMM GRANULOCYTES NFR BLD AUTO: 0.2 % (ref 0–0.5)
LDLC SERPL CALC-MCNC: 223 MG/DL (ref 0–100)
LYMPHOCYTES # BLD AUTO: 2.24 10*3/MM3 (ref 0.7–3.1)
LYMPHOCYTES NFR BLD AUTO: 45.8 % (ref 19.6–45.3)
MCH RBC QN AUTO: 29.7 PG (ref 26.6–33)
MCHC RBC AUTO-ENTMCNC: 33.7 G/DL (ref 31.5–35.7)
MCV RBC AUTO: 88 FL (ref 79–97)
MONOCYTES # BLD AUTO: 0.54 10*3/MM3 (ref 0.1–0.9)
MONOCYTES NFR BLD AUTO: 11 % (ref 5–12)
NEUTROPHILS # BLD AUTO: 1.84 10*3/MM3 (ref 1.7–7)
NEUTROPHILS NFR BLD AUTO: 37.7 % (ref 42.7–76)
NRBC BLD AUTO-RTO: 0 /100 WBC (ref 0–0.2)
PLATELET # BLD AUTO: 282 10*3/MM3 (ref 140–450)
POTASSIUM SERPL-SCNC: 4.4 MMOL/L (ref 3.5–5.2)
PROT SERPL-MCNC: 6.3 G/DL (ref 6–8.5)
RBC # BLD AUTO: 3.84 10*6/MM3 (ref 3.77–5.28)
SODIUM SERPL-SCNC: 143 MMOL/L (ref 136–145)
TRIGL SERPL-MCNC: 88 MG/DL (ref 0–150)
TSH SERPL DL<=0.005 MIU/L-ACNC: 3.11 UIU/ML (ref 0.27–4.2)
VLDLC SERPL CALC-MCNC: 14 MG/DL (ref 5–40)
WBC # BLD AUTO: 4.89 10*3/MM3 (ref 3.4–10.8)

## 2021-02-25 PROCEDURE — 99215 OFFICE O/P EST HI 40 MIN: CPT | Performed by: INTERNAL MEDICINE

## 2021-02-25 PROCEDURE — 93005 ELECTROCARDIOGRAM TRACING: CPT | Performed by: INTERNAL MEDICINE

## 2021-02-25 RX ORDER — NITROGLYCERIN 0.4 MG/1
0.4 TABLET SUBLINGUAL
Qty: 12 TABLET | Refills: 12 | Status: SHIPPED | OUTPATIENT
Start: 2021-02-25

## 2021-02-25 RX ORDER — ASPIRIN 81 MG/1
81 TABLET ORAL DAILY
Qty: 30 TABLET | Refills: 1 | Status: SHIPPED | OUTPATIENT
Start: 2021-02-25

## 2021-02-25 NOTE — PROGRESS NOTES
Subjective   Krystina Henley is a 65 y.o. female.     Chief Complaint   Patient presents with   • Referral - Heart [facility]     cardiology, pt having chest pain for a year and chest pain is more frequent       History of Present Illness   Patient complaints chest pain lower chest epigastric area dull achy in nature.  Pain usually started after the beginning of the exercise.  After nitroglycerin from her family 2 to 3 minutes pain relieves patient can go on for another 5 miles exercise.  Occasional dizziness with pain.  Pain radiate to the back and also to the back of the neck and the back of the arms.  No palpitation no short of breath.  Patient does have a history of a GERD patient states it is different from heartburn she explains patient had erosive esophagitis in the past.  Denies any abdominal pain.  Family history of a heart disease.  Patient still has some nausea occasionally.  Recent cholesterol check very high and hemoglobin slightly lower.    Current Outpatient Medications:   •  omeprazole (priLOSEC) 40 MG capsule, TAKE 1 CAPSULE BY MOUTH IN THE MORNING 30 minutes before breakfast and 1 capsule by mouth in the evening, Disp: 60 capsule, Rfl: 5  •  rosuvastatin (CRESTOR) 10 MG tablet, TAKE 1 TABLET BY MOUTH ONE TIME A DAY  (Patient taking differently: Take 10 mg by mouth Every Night.), Disp: 30 tablet, Rfl: 0  •  aspirin (aspirin) 81 MG EC tablet, Take 1 tablet by mouth Daily., Disp: 30 tablet, Rfl: 1  •  metoprolol tartrate (LOPRESSOR) 25 MG tablet, Take 1 tablet by mouth 2 (Two) Times a Day., Disp: 60 tablet, Rfl: 0  •  nitroglycerin (Nitrostat) 0.4 MG SL tablet, Place 1 tablet under the tongue Every 5 (Five) Minutes As Needed for Chest Pain. Take no more than 3 doses in 15 minutes., Disp: 12 tablet, Rfl: 12    The following portions of the patient's history were reviewed and updated as appropriate: allergies, current medications, past family history, past medical history, past social history, past  surgical history and problem list.    Review of Systems   Constitutional: Negative.    Respiratory: Negative.    Cardiovascular: Positive for chest pain.   Gastrointestinal: Positive for nausea.        Occasional heartburn   Musculoskeletal: Negative.    Skin: Negative.    Neurological: Negative.    Psychiatric/Behavioral: Negative.        Objective   Physical Exam  HENT:      Head: Normocephalic and atraumatic.   Neck:      Musculoskeletal: Neck supple.   Cardiovascular:      Rate and Rhythm: Normal rate and regular rhythm.      Heart sounds: Normal heart sounds.      Comments: No chest tenderness  Pulmonary:      Effort: Pulmonary effort is normal.      Breath sounds: Normal breath sounds.   Abdominal:      General: Bowel sounds are normal. There is no distension.      Tenderness: There is no abdominal tenderness.   Skin:     General: Skin is warm.   Neurological:      Mental Status: She is alert and oriented to person, place, and time.         All tests have been reviewed.  Independent interpretation of test EKG.  Stress test ordered.  Nitroglycerin and a beta-blocker prescribed which she needs monitoring.    Assessment/Plan   Diagnoses and all orders for this visit:    Encounter for screening mammogram for malignant neoplasm of breast    Hyperlipidemia, unspecified hyperlipidemia type probable familial hyperlipidemia patient unable to take Lipitor 10 mg daily.  Patient is taking twice a week only.    Nausea continue stomach medicine    Heartburn May increase omeprazole to twice a day for now    Epigastric pain possible related to GERD versus gallbladder disease versus coronary artery disease.  We will do stress test first    Chest pain, unspecified type. To ER of worse  -     nitroglycerin (Nitrostat) 0.4 MG SL tablet; Place 1 tablet under the tongue Every 5 (Five) Minutes As Needed for Chest Pain. Take no more than 3 doses in 15 minutes.  -     metoprolol tartrate (LOPRESSOR) 25 MG tablet; Take 1 tablet by  mouth 2 (Two) Times a Day.  -     aspirin (aspirin) 81 MG EC tablet; Take 1 tablet by mouth Daily.  -     Stress Test With Myocardial Perfusion One Day    Adenomatous polyp of colon, unspecified part of colon    Gastroesophageal reflux disease without esophagitis    Anemia, unspecified type    2 weeks follow-up after stress test  With others          ECG 12 Lead    Date/Time: 2/25/2021 2:10 PM  Performed by: Tre Lindquist MD  Authorized by: Tre Lindquist MD   Comparison: not compared with previous ECG   Rhythm: sinus rhythm  Rate: normal  Conduction: conduction normal  Q waves: III and V1    ST Segments: ST segments normal  T inversion: III, V1 and V3  QRS axis: normal  Other: no other findings    Clinical impression: abnormal EKG            Answers for HPI/ROS submitted by the patient on 2/24/2021   What is the primary reason for your visit?: Other  Please describe your symptoms.: Chest pain.  Have you had these symptoms before?: Yes  How long have you been having these symptoms?: Greater than 2 weeks  Please describe any probable cause for these symptoms. : Physical activities

## 2021-03-02 ENCOUNTER — LAB (OUTPATIENT)
Dept: LAB | Facility: HOSPITAL | Age: 66
End: 2021-03-02

## 2021-03-02 ENCOUNTER — TRANSCRIBE ORDERS (OUTPATIENT)
Dept: LAB | Facility: HOSPITAL | Age: 66
End: 2021-03-02

## 2021-03-02 DIAGNOSIS — Z01.818 PRE-OP TESTING: ICD-10-CM

## 2021-03-02 DIAGNOSIS — Z01.818 PRE-OP TESTING: Primary | ICD-10-CM

## 2021-03-02 LAB — SARS-COV-2 RNA RESP QL NAA+PROBE: NOT DETECTED

## 2021-03-02 PROCEDURE — C9803 HOPD COVID-19 SPEC COLLECT: HCPCS

## 2021-03-02 PROCEDURE — U0005 INFEC AGEN DETEC AMPLI PROBE: HCPCS

## 2021-03-02 PROCEDURE — U0004 COV-19 TEST NON-CDC HGH THRU: HCPCS

## 2021-03-05 ENCOUNTER — HOSPITAL ENCOUNTER (OUTPATIENT)
Dept: NUCLEAR MEDICINE | Facility: HOSPITAL | Age: 66
Discharge: HOME | End: 2021-03-05

## 2021-03-05 PROCEDURE — 25010000002 REGADENOSON 0.4 MG/5ML SOLUTION: Performed by: INTERNAL MEDICINE

## 2021-03-05 PROCEDURE — 78452 HT MUSCLE IMAGE SPECT MULT: CPT

## 2021-03-05 PROCEDURE — 93017 CV STRESS TEST TRACING ONLY: CPT

## 2021-03-05 PROCEDURE — 78452 HT MUSCLE IMAGE SPECT MULT: CPT | Performed by: INTERNAL MEDICINE

## 2021-03-05 PROCEDURE — A9500 TC99M SESTAMIBI: HCPCS | Performed by: INTERNAL MEDICINE

## 2021-03-05 PROCEDURE — 0 TECHNETIUM SESTAMIBI: Performed by: INTERNAL MEDICINE

## 2021-03-05 PROCEDURE — 93018 CV STRESS TEST I&R ONLY: CPT | Performed by: INTERNAL MEDICINE

## 2021-03-05 RX ADMIN — TECHNETIUM TC 99M SESTAMIBI 1 DOSE: 1 INJECTION INTRAVENOUS at 10:08

## 2021-03-05 RX ADMIN — REGADENOSON 0.4 MG: 0.08 INJECTION, SOLUTION INTRAVENOUS at 10:08

## 2021-03-05 RX ADMIN — TECHNETIUM TC 99M SESTAMIBI 1 DOSE: 1 INJECTION INTRAVENOUS at 07:48

## 2021-03-09 ENCOUNTER — LAB (OUTPATIENT)
Dept: LAB | Facility: HOSPITAL | Age: 66
End: 2021-03-09

## 2021-03-09 ENCOUNTER — OFFICE VISIT (OUTPATIENT)
Dept: GASTROENTEROLOGY | Facility: CLINIC | Age: 66
End: 2021-03-09

## 2021-03-09 VITALS
SYSTOLIC BLOOD PRESSURE: 121 MMHG | WEIGHT: 160 LBS | BODY MASS INDEX: 29.44 KG/M2 | TEMPERATURE: 97.1 F | DIASTOLIC BLOOD PRESSURE: 71 MMHG | RESPIRATION RATE: 16 BRPM | HEART RATE: 70 BPM | HEIGHT: 62 IN

## 2021-03-09 DIAGNOSIS — I20.9 ANGINA PECTORIS (HCC): Primary | ICD-10-CM

## 2021-03-09 DIAGNOSIS — K63.5 HYPERPLASTIC POLYP OF SIGMOID COLON: ICD-10-CM

## 2021-03-09 DIAGNOSIS — D64.9 ANEMIA, UNSPECIFIED TYPE: ICD-10-CM

## 2021-03-09 DIAGNOSIS — D64.9 NORMOCYTIC ANEMIA: ICD-10-CM

## 2021-03-09 DIAGNOSIS — K22.10 EROSIVE ESOPHAGITIS: ICD-10-CM

## 2021-03-09 DIAGNOSIS — R94.5 ABNORMAL RESULTS OF LIVER FUNCTION STUDIES: ICD-10-CM

## 2021-03-09 DIAGNOSIS — Z11.59 ENCOUNTER FOR SCREENING FOR OTHER VIRAL DISEASES: ICD-10-CM

## 2021-03-09 DIAGNOSIS — D36.9 TUBULOVILLOUS ADENOMA: ICD-10-CM

## 2021-03-09 DIAGNOSIS — K22.2 ESOPHAGEAL STRICTURE: ICD-10-CM

## 2021-03-09 DIAGNOSIS — R94.5 ABNORMAL RESULTS OF LIVER FUNCTION STUDIES: Primary | ICD-10-CM

## 2021-03-09 LAB
ALBUMIN SERPL-MCNC: 3.9 G/DL (ref 3.5–5.2)
ALBUMIN/GLOB SERPL: 1.3 G/DL
ALP SERPL-CCNC: 61 U/L (ref 39–117)
ALT SERPL W P-5'-P-CCNC: 18 U/L (ref 1–33)
ANION GAP SERPL CALCULATED.3IONS-SCNC: 9.3 MMOL/L (ref 5–15)
AST SERPL-CCNC: 26 U/L (ref 1–32)
BH CV STRESS COMMENTS STAGE 1: ABNORMAL
BH CV STRESS DOSE REGADENOSON STAGE 1: 0.4
BH CV STRESS DURATION MIN STAGE 1: 0
BH CV STRESS DURATION SEC STAGE 1: 10
BH CV STRESS PROTOCOL 1: ABNORMAL
BH CV STRESS RECOVERY BP: ABNORMAL MMHG
BH CV STRESS RECOVERY HR: 74 BPM
BH CV STRESS STAGE 1: 1
BILIRUB SERPL-MCNC: 0.2 MG/DL (ref 0–1.2)
BUN SERPL-MCNC: 19 MG/DL (ref 8–23)
BUN/CREAT SERPL: 25 (ref 7–25)
CALCIUM SPEC-SCNC: 8.7 MG/DL (ref 8.6–10.5)
CHLORIDE SERPL-SCNC: 108 MMOL/L (ref 98–107)
CO2 SERPL-SCNC: 24.7 MMOL/L (ref 22–29)
CREAT SERPL-MCNC: 0.76 MG/DL (ref 0.57–1)
DEPRECATED RDW RBC AUTO: 46.1 FL (ref 37–54)
ERYTHROCYTE [DISTWIDTH] IN BLOOD BY AUTOMATED COUNT: 14.4 % (ref 12.3–15.4)
GFR SERPL CREATININE-BSD FRML MDRD: 76 ML/MIN/1.73
GLOBULIN UR ELPH-MCNC: 2.9 GM/DL
GLUCOSE SERPL-MCNC: 80 MG/DL (ref 65–99)
HBV SURFACE AG SERPL QL IA: NORMAL
HCT VFR BLD AUTO: 37.2 % (ref 34–46.6)
HGB BLD-MCNC: 12.5 G/DL (ref 12–15.9)
IRON 24H UR-MRATE: 51 MCG/DL (ref 37–145)
IRON SATN MFR SERPL: 11 % (ref 20–50)
LV EF NUC BP: 74 %
MAXIMAL PREDICTED HEART RATE: 154 BPM
MCH RBC QN AUTO: 30 PG (ref 26.6–33)
MCHC RBC AUTO-ENTMCNC: 33.6 G/DL (ref 31.5–35.7)
MCV RBC AUTO: 89.2 FL (ref 79–97)
PERCENT MAX PREDICTED HR: 55.84 %
PLATELET # BLD AUTO: 309 10*3/MM3 (ref 140–450)
PMV BLD AUTO: 10.9 FL (ref 6–12)
POTASSIUM SERPL-SCNC: 4.5 MMOL/L (ref 3.5–5.2)
PROT SERPL-MCNC: 6.8 G/DL (ref 6–8.5)
RBC # BLD AUTO: 4.17 10*6/MM3 (ref 3.77–5.28)
SODIUM SERPL-SCNC: 142 MMOL/L (ref 136–145)
STRESS BASELINE BP: ABNORMAL MMHG
STRESS BASELINE HR: 54 BPM
STRESS PERCENT HR: 66 %
STRESS POST PEAK BP: ABNORMAL MMHG
STRESS POST PEAK HR: 86 BPM
STRESS TARGET HR: 131 BPM
TIBC SERPL-MCNC: 446 MCG/DL (ref 298–536)
TRANSFERRIN SERPL-MCNC: 299 MG/DL (ref 200–360)
VIT B12 BLD-MCNC: 664 PG/ML (ref 211–946)
WBC # BLD AUTO: 5.68 10*3/MM3 (ref 3.4–10.8)

## 2021-03-09 PROCEDURE — 82607 VITAMIN B-12: CPT

## 2021-03-09 PROCEDURE — 86704 HEP B CORE ANTIBODY TOTAL: CPT

## 2021-03-09 PROCEDURE — 36415 COLL VENOUS BLD VENIPUNCTURE: CPT

## 2021-03-09 PROCEDURE — 85027 COMPLETE CBC AUTOMATED: CPT

## 2021-03-09 PROCEDURE — 99214 OFFICE O/P EST MOD 30 MIN: CPT | Performed by: NURSE PRACTITIONER

## 2021-03-09 PROCEDURE — 80053 COMPREHEN METABOLIC PANEL: CPT

## 2021-03-09 PROCEDURE — 83540 ASSAY OF IRON: CPT

## 2021-03-09 PROCEDURE — 86317 IMMUNOASSAY INFECTIOUS AGENT: CPT

## 2021-03-09 PROCEDURE — 84466 ASSAY OF TRANSFERRIN: CPT

## 2021-03-09 PROCEDURE — 87340 HEPATITIS B SURFACE AG IA: CPT

## 2021-03-09 PROCEDURE — 86708 HEPATITIS A ANTIBODY: CPT

## 2021-03-09 RX ORDER — FAMOTIDINE 20 MG/1
20 TABLET, FILM COATED ORAL 2 TIMES DAILY
Qty: 60 TABLET | Refills: 5 | Status: SHIPPED | OUTPATIENT
Start: 2021-03-09 | End: 2021-06-16 | Stop reason: SDUPTHER

## 2021-03-09 NOTE — PATIENT INSTRUCTIONS
Antireflux measures: Avoid fried, fatty foods, alcohol, chocolate, coffee, tea,  soft drinks, peppermint and spearmint, spicy foods, tomatoes and tomato based foods, onion based foods, and smoking. Other antireflux measures include weight reduction if overweight, avoiding tight clothing around the abdomen, elevating the head of the bed 6 inches with blocks under the head board, and don't drink or eat before going to bed and avoid lying down immediately after meals.  Pepcid 20 mg 1 po twice a day at patient request.  Hold Omeprazole for now.  High fiber, low fat diet with liberal water intake.   Advised to exercise 30 minutes 3-4 days per week.   Advised to lose 10-15 pounds in the next 6-12 months.  Labs  Colonoscopy in 1 year for surveillance, 2022.  Follow up: 3 months or sooner if symptoms worsen.

## 2021-03-09 NOTE — PROGRESS NOTES
Follow Up Note     Date: 2021   Patient Name: Krystina Henley  MRN: [id number]  : 1955     Primary Care Provider: Tre Lindquist MD     Chief Complaint:    Chief Complaint   Patient presents with   • Follow-up   • Med Refill     History of present illness:   3/9/2021  Krystina Henley is a 65 y.o. female who is here today for follow up for medication refill.     There is a long standing history of reflux. She has been taking Omeprazole 20 mg twice a day and occasionally takes an Omeprazole 40 mg if she is having breakthrough reflux. Reflux is severe. She admits she does not follow anti-reflux guidelines and eats most foods she should be avoiding. She would like to try to switch to Pepcid due to risk of long term use of PPI therapy. She has not had any further episodes of diarrhea or rectal bleeding.    Interval History:  9/3/2020  Krystina Henley is a 64 y.o. female who is here today for follow up for after procedure.  Denies any rectal bleeding since the procedure.   Her bowel movements have regularized now and diarrhea resolved.  She is here to discuss the pathology report and biopsy findings     2020  The patient has a history of reflux off and on for the last several years.  The reflux is rather severe.  Symptoms are described as retrosternal burning sensation, and indigestion.  There is history of occasional regurgitative symptoms.  Frequency being several times per week.  The symptoms are worse at night.  The patient takes acid suppressive therapy with significant breakthrough symptoms.  The patient denies significant consumption of soda beverages, coffee or tea.  She denies eating chocolates.  The patient is a non-smoker.  She however drinks wine perhaps 2 drinks a week.  There is history of some recent weight gain of about 5 pounds over the last 1 month or so.  Her swallowing has significantly improved.     The patient has history of diarrhea off-and-on for the  "last several months.  Diarrhea is episodic and occurs perhaps couple of times a week.  Severity is mild, frequency of bowel movements being 2-3 times a day.  The stools are described as loose.  Occasionally, there is nocturnal element of diarrhea. The diarrhea is not associated with tenesmus.  The patient has been having recurrent bright red blood per rectum.  She has also noticed passage of \"clots\".  There is no associated dizziness.  There is some soreness at the rectal area.  There is no history of hematemesis or melena.  Her previous colonoscopy was in 2014.     There is no history of liver or pancreatic disease.  There is no family history of colon cancer, inflammatory bowel disease, celiac disease or chronic liver disease.    Subjective      Past Medical History:   Diagnosis Date   • Anemia    • Anxiety    • Colon polyp 01/16/2014   • Depression    • Diverticulosis    • Elevated cholesterol    • Fibroid     uterine   • GERD (gastroesophageal reflux disease)    • Heartburn    • Hiatal hernia    • History of transfusion     no adverse reactions   • Hyperlipidemia    • Osteoarthritis     generalized   • Tinnitus    • Wears glasses     reading only     Past Surgical History:   Procedure Laterality Date   • BLADDER SLING MODIFIED, ANTERIOR AND POSTERIOR VAGINAL REPAIR     • BREAST BIOPSY     • COLONOSCOPY  01/16/2014   • COLONOSCOPY N/A 7/31/2020    Procedure: COLONOSCOPY WITH BIOPSY AND HOT SNARE POLYPECTOMY;  Surgeon: Juan Coreas MD;  Location: [facility] ENDOSCOPY;  Service: Gastroenterology;  Laterality: N/A;   • COLONOSCOPY N/A 1/25/2021    Procedure: COLONOSCOPY WITH COLD BIOPSY POLYPECTOMY;  Surgeon: Juan Coreas MD;  Location: [facility] ENDOSCOPY;  Service: Gastroenterology;  Laterality: N/A;   • ENDOSCOPY N/A 2/19/2020    Procedure: ESOPHAGOGASTRODUODENOSCOPY;  Surgeon: Marito Abdi MD;  Location: [facility] ENDOSCOPY;  Service: Gastroenterology;  Laterality: N/A;   • ENDOSCOPY N/A 6/3/2020    " Procedure: ESOPHAGOGASTRODUODENOSCOPY;  Surgeon: Marito Abdi MD;  Location: [facility] ENDOSCOPY;  Service: Gastroenterology;  Laterality: N/A;   • HYSTERECTOMY      still has one ovary on left side   • UPPER GASTROINTESTINAL ENDOSCOPY  2014   • VAGINAL DELIVERY      x3   • WISDOM TOOTH EXTRACTION       Family History   Problem Relation Age of Onset   • Lung cancer Mother    • Lung cancer Father    • Prostate cancer Brother         x 3 brothers   • Heart attack Brother         x 2 brothers    • Colon cancer Neg Hx    • Cirrhosis Neg Hx    • Liver disease Neg Hx    • Liver cancer Neg Hx    • Crohn's disease Neg Hx      Social History     Socioeconomic History   • Marital status:      Spouse name: Not on file   • Number of children: Not on file   • Years of education: Not on file   • Highest education level: Not on file   Tobacco Use   • Smoking status: Former Smoker     Packs/day: 1.00     Years: 10.00     Pack years: 10.00     Types: Cigarettes     Quit date: 2016     Years since quittin.6   • Smokeless tobacco: Never Used   Vaping Use   • Vaping Use: Never used   Substance and Sexual Activity   • Alcohol use: Yes     Comment: social   • Drug use: No   • Sexual activity: Defer     Partners: Male     Birth control/protection: Post-menopausal       Current Outpatient Medications:   •  aspirin (aspirin) 81 MG EC tablet, Take 1 tablet by mouth Daily., Disp: 30 tablet, Rfl: 1  •  metoprolol tartrate (LOPRESSOR) 25 MG tablet, Take 1 tablet by mouth 2 (Two) Times a Day., Disp: 60 tablet, Rfl: 0  •  nitroglycerin (Nitrostat) 0.4 MG SL tablet, Place 1 tablet under the tongue Every 5 (Five) Minutes As Needed for Chest Pain. Take no more than 3 doses in 15 minutes., Disp: 12 tablet, Rfl: 12  •  rosuvastatin (CRESTOR) 10 MG tablet, TAKE 1 TABLET BY MOUTH ONE TIME A DAY  (Patient taking differently: Take 10 mg by mouth Every Night.), Disp: 30 tablet, Rfl: 0  •  famotidine (PEPCID) 20 MG tablet, Take 1  tablet by mouth 2 (Two) Times a Day., Disp: 60 tablet, Rfl: 5     No Known Allergies     The following portions of the patient's history were reviewed and updated as appropriate: allergies, current medications, past family history, past medical history, past social history, past surgical history and problem list.    Objective     Physical Exam  Vitals and nursing note reviewed.   Constitutional:       General: She is awake. She is not in acute distress.     Appearance: Normal appearance. She is well-developed. She is not diaphoretic.   HENT:      Head: Normocephalic and atraumatic.      Right Ear: Hearing and external ear normal.      Left Ear: Hearing and external ear normal.      Nose: Nose normal.      Mouth/Throat:      Mouth: Mucous membranes are not pale, not dry and not cyanotic.   Eyes:      General: Lids are normal.         Right eye: No discharge.         Left eye: No discharge.      Conjunctiva/sclera: Conjunctivae normal.   Neck:      Thyroid: No thyroid mass or thyromegaly.      Vascular: No JVD.      Trachea: Trachea normal.   Cardiovascular:      Rate and Rhythm: Normal rate and regular rhythm.      Heart sounds: Normal heart sounds and S2 normal. No murmur. No friction rub. No gallop. No S3 sounds.    Pulmonary:      Effort: Pulmonary effort is normal. No respiratory distress.      Breath sounds: Normal breath sounds.   Chest:      Chest wall: No tenderness.   Abdominal:      General: Bowel sounds are normal. There is no distension.      Palpations: Abdomen is not rigid. There is no hepatomegaly, splenomegaly or mass.      Tenderness: There is no abdominal tenderness. There is no guarding or rebound.      Hernia: No hernia is present.   Musculoskeletal:         General: Normal range of motion.      Cervical back: Neck supple. No edema.   Lymphadenopathy:      Cervical: No cervical adenopathy.      Upper Body:      Left upper body: No supraclavicular adenopathy.   Skin:     General: Skin is warm and  "dry.      Coloration: Skin is not pale.      Findings: No rash.      Nails: There is no clubbing.   Neurological:      Mental Status: She is alert and oriented to person, place, and time.      Cranial Nerves: No cranial nerve deficit.      Sensory: No sensory deficit.   Psychiatric:         Mood and Affect: Mood normal.         Speech: Speech normal.         Behavior: Behavior is cooperative.       Vitals:    03/09/21 1039   BP: 121/71   Pulse: 70   Resp: 16   Temp: 97.1 °F (36.2 °C)   Weight: 72.6 kg (160 lb)   Height: 157.5 cm (62\")     Results Review:   I reviewed the patient's new clinical results.    Lab on 03/02/2021   Component Date Value Ref Range Status   • SARS-CoV-2 MAURICE 03/02/2021 Not Detected  Not Detected Final   Office Visit on 02/25/2021   Component Date Value Ref Range Status   • BH CV STRESS PROTOCOL 1 03/05/2021 Pharmacologic   Final   • Stage 1 03/05/2021 1   Final   • Duration Min Stage 1 03/05/2021 0   Final   • Duration Sec Stage 1 03/05/2021 10   Final   • Stress Dose Regadenoson Stage 1 03/05/2021 0.4   Final   • Stress Comments Stage 1 03/05/2021 10 sec bolus injection   Final   • Target HR (85%) 03/05/2021 131  bpm Final   • Max. Pred. HR (100%) 03/05/2021 154  bpm Final   • Baseline HR 03/05/2021 54  bpm Final   • Baseline BP 03/05/2021 135/85  mmHg Final   • Peak HR 03/05/2021 86  bpm Final   • Percent Max Pred HR 03/05/2021 55.84  % Final   • Percent Target HR 03/05/2021 66  % Final   • Peak BP 03/05/2021 140/79  mmHg Final   • Recovery HR 03/05/2021 74  bpm Final   • Recovery BP 03/05/2021 140/79  mmHg Final   • Nuc Stress EF 03/05/2021 74  % Final   Orders Only on 02/23/2021   Component Date Value Ref Range Status   • WBC 02/24/2021 4.89  3.40 - 10.80 10*3/mm3 Final   • RBC 02/24/2021 3.84  3.77 - 5.28 10*6/mm3 Final   • Hemoglobin 02/24/2021 11.4* 12.0 - 15.9 g/dL Final   • Hematocrit 02/24/2021 33.8* 34.0 - 46.6 % Final   • MCV 02/24/2021 88.0  79.0 - 97.0 fL Final   • MCH 02/24/2021 " 29.7  26.6 - 33.0 pg Final   • MCHC 02/24/2021 33.7  31.5 - 35.7 g/dL Final   • RDW 02/24/2021 14.6  12.3 - 15.4 % Final   • Platelets 02/24/2021 282  140 - 450 10*3/mm3 Final   • Neutrophil Rel % 02/24/2021 37.7* 42.7 - 76.0 % Final   • Lymphocyte Rel % 02/24/2021 45.8* 19.6 - 45.3 % Final   • Monocyte Rel % 02/24/2021 11.0  5.0 - 12.0 % Final   • Eosinophil Rel % 02/24/2021 4.1  0.3 - 6.2 % Final   • Basophil Rel % 02/24/2021 1.2  0.0 - 1.5 % Final   • Neutrophils Absolute 02/24/2021 1.84  1.70 - 7.00 10*3/mm3 Final   • Lymphocytes Absolute 02/24/2021 2.24  0.70 - 3.10 10*3/mm3 Final   • Monocytes Absolute 02/24/2021 0.54  0.10 - 0.90 10*3/mm3 Final   • Eosinophils Absolute 02/24/2021 0.20  0.00 - 0.40 10*3/mm3 Final   • Basophils Absolute 02/24/2021 0.06  0.00 - 0.20 10*3/mm3 Final   • Immature Granulocyte Rel % 02/24/2021 0.2  0.0 - 0.5 % Final   • Immature Grans Absolute 02/24/2021 0.01  0.00 - 0.05 10*3/mm3 Final   • nRBC 02/24/2021 0.0  0.0 - 0.2 /100 WBC Final   • Glucose 02/24/2021 86  65 - 99 mg/dL Final   • BUN 02/24/2021 27* 8 - 23 mg/dL Final   • Creatinine 02/24/2021 0.72  0.57 - 1.00 mg/dL Final   • eGFR Non  Am 02/24/2021 81  >60 mL/min/1.73 Final    Comment: GFR Normal >60  Chronic Kidney Disease <60  Kidney Failure <15     • eGFR  Am 02/24/2021 98  >60 mL/min/1.73 Final   • BUN/Creatinine Ratio 02/24/2021 37.5* 7.0 - 25.0 Final   • Sodium 02/24/2021 143  136 - 145 mmol/L Final   • Potassium 02/24/2021 4.4  3.5 - 5.2 mmol/L Final   • Chloride 02/24/2021 107  98 - 107 mmol/L Final   • Total CO2 02/24/2021 27.9  22.0 - 29.0 mmol/L Final   • Calcium 02/24/2021 9.0  8.6 - 10.5 mg/dL Final   • Total Protein 02/24/2021 6.3  6.0 - 8.5 g/dL Final   • Albumin 02/24/2021 3.90  3.50 - 5.20 g/dL Final   • Globulin 02/24/2021 2.4  gm/dL Final   • A/G Ratio 02/24/2021 1.6  g/dL Final   • Total Bilirubin 02/24/2021 0.2  0.0 - 1.2 mg/dL Final   • Alkaline Phosphatase 02/24/2021 73  39 - 117 U/L Final    • AST (SGOT) 02/24/2021 47* 1 - 32 U/L Final   • ALT (SGPT) 02/24/2021 39* 1 - 33 U/L Final   • Total Cholesterol 02/24/2021 320* 0 - 200 mg/dL Final    Comment: Cholesterol Reference Ranges  (U.S. Department of Health and Human Services ATP III  Classifications)  Desirable          <200 mg/dL  Borderline High    200-239 mg/dL  High Risk          >240 mg/dL  Triglyceride Reference Ranges  (U.S. Department of Health and Human Services ATP III  Classifications)  Normal           <150 mg/dL  Borderline High  150-199 mg/dL  High             200-499 mg/dL  Very High        >500 mg/dL  HDL Reference Ranges  (U.S. Department of Health and Human Services ATP III  Classifcations)  Low     <40 mg/dl (major risk factor for CHD)  High    >60 mg/dl ('negative' risk factor for CHD)  LDL Reference Ranges  (U.S. Department of Health and Human Services ATP III  Classifcations)  Optimal          <100 mg/dL  Near Optimal     100-129 mg/dL  Borderline High  130-159 mg/dL  High             160-189 mg/dL  Very High        >189 mg/dL     • Triglycerides 02/24/2021 88  0 - 150 mg/dL Final   • HDL Cholesterol 02/24/2021 83* 40 - 60 mg/dL Final   • VLDL Cholesterol Simone 02/24/2021 14  5 - 40 mg/dL Final   • LDL Chol Calc (NIH) 02/24/2021 223* 0 - 100 mg/dL Final   • TSH 02/24/2021 3.110  0.270 - 4.200 uIU/mL Final   • Creatine Kinase 02/24/2021 164  20 - 180 U/L Final   • Hep C Virus Ab 02/24/2021 <0.1  0.0 - 0.9 s/co ratio Final    Comment:                                   Negative:     < 0.8                               Indeterminate: 0.8 - 0.9                                    Positive:     > 0.9   The CDC recommends that a positive HCV antibody result   be followed up with a HCV Nucleic Acid Amplification   test (030740).     Admission on 01/25/2021, Discharged on 01/25/2021   Component Date Value Ref Range Status   • Case Report 01/25/2021    Final                    Value:Surgical Pathology Report                         Case:  [id number]                                  Authorizing Provider:  Juan Coreas MD  Collected:           01/25/2021 10:25 AM          Ordering Location:     [facility]    Received:            01/25/2021 02:28 PM                                 [facility]                                                                    Pathologist:           Jalen Ashton MD                                                       Specimen:    Large Intestine, Sigmoid Colon                                                            • Final Diagnosis 01/25/2021    Final                    Value:This result contains rich text formatting which cannot be displayed here.   Lab on 01/21/2021   Component Date Value Ref Range Status   • SARS-CoV-2 MAURICE 01/21/2021 Not Detected  Not Detected Final      No radiology results for the last 90 days.     Dated Yari 3, 2020 the patient underwent an upper endoscopy which revealed: Erosive distal esophagitis.  LA class A.  Distal esophageal angulation and early stricturing.  Status post dilation to 18 mm.  Free gastroesophageal reflux.  Moderate sliding hiatal hernia around 3 to 4 cm.  Erythematous gastritis.  No definite Castellanos's.  Second portion of duodenum, biopsy revealed unremarkable duodenal mucosa with preserved villous architecture.  Antrum, body and fundus, biopsies revealed reactive gastropathy with minimal chronic inactive gastritis.  No Helicobacter organisms identified.  Negative for intestinal metaplasia, dysplasia and malignancy.  Stomach, body and fundus, polyps, biopsy revealed polypoid fragments of oxyntic mucosa with minimal chronic inactive gastritis and mild foveolar hyperplasia.  No Helicobacter organisms identified.  Negative for intestinal metaplasia, dysplasia and malignancy.    Colonoscopy dated 1/25/2021 prior polypectomy site rectosigmoid appeared normal.  1 3 mm polyp in sigmoid colon removed.  Diverticulosis in sigmoid colon.  Nonbleeding  internal hemorrhoids.  Otherwise unremarkable.  Sigmoid colon polyp biopsy with hyperplastic polyp.    Assessment / Plan      1. Abnormal results of liver function studies  Recent labs with elevated LFT's. No personal or family history of liver disease in the past. She drinks 2 glasses of wine per week on average. No history of IVDA, tattoos or blood transfusions. Hepatitis C antibody negative. Liver US in 2020 with small gallbladder wall polyps, otherwise unremarkable.  Labs  If liver enzymes continue to be elevated, will need further evaluation.    - Comprehensive Metabolic Panel; Future  - Hepatitis A Antibody, Total; Future  - Hepatitis B Surf Antibody Quant; Future  - Hepatitis B Surface Antigen; Future  - Hepatitis B Core Antibody, Total; Future    2. Erosive esophagitis with gastroesophageal reflux disease  3. Esophageal stricture  History of severe reflux. She is taking Omeprazole 20 mg twice a day and Omeprazole 40 mg as needed for breakthrough symptoms. She admits she is not following any of the anti-reflux guidelines. She would like to try to switch to Pepcid due to potential for long term use of PPI side effects. No further episodes of difficulty swallowing.   Long discussion regarding anti-reflux measures.  Pepcid 20 mg 1 po twice a day.    - famotidine (PEPCID) 20 MG tablet; Take 1 tablet by mouth 2 (Two) Times a Day.  Dispense: 60 tablet; Refill: 5    9/3/2020  EGD done in June 2020 revealed lower esophagus peptic stricture which was dilated to 18 mm with a savory dilator.    Swallowing better now after she had a esophageal dilatation earlier this year.  We will monitor for now    4. Hyperplastic polyp of sigmoid colon  5. Tubulovillous adenoma  Colonoscopy in January 2021 with 1 hyperplastic polyp removed. Prior polypectomy site unremarkable.  Colonoscopy in 1 year for surveillance, 2022.     9/3/2020  She had a colonoscopy done on 7/31/2020.  Colonoscopy revealed 8 to 10 mm polyp in the ascending  colon and 2 more in the hepatic flexure.   2 of these polyps were tubular adenoma and 1 of them sessile serrated adenoma without any dysplasia  She had a large 25 to 30 mm pedunculated lesion in the rectosigmoid junction.  Which was removed with a hot snare piecemeal.  Pathology consistent with a tubulovillous adenoma with high-grade dysplasia.  Polyp stalk did not reveal any high-grade dysplasia.   She needs a surveillance colonoscopy in 6 months time    6. Normocytic anemia  Recent labs with very mild anemia. Patient denies GI bleeding, no hematemesis, hematochezia or melena. No vaginal bleeding or nosebleeds. EGD and colonoscopy with no source of anemia.  Labs  CTAP in the future to rule out solid organ pathology if she continues to be anemic.     - CBC (No Diff); Future  - Iron Profile; Future  - Vitamin B12; Future    7. Encounter for screening for other viral diseases     - Hepatitis B Surface Antigen; Future  - Hepatitis B Core Antibody, Total; Future    Patient Instructions   Antireflux measures: Avoid fried, fatty foods, alcohol, chocolate, coffee, tea,  soft drinks, peppermint and spearmint, spicy foods, tomatoes and tomato based foods, onion based foods, and smoking. Other antireflux measures include weight reduction if overweight, avoiding tight clothing around the abdomen, elevating the head of the bed 6 inches with blocks under the head board, and don't drink or eat before going to bed and avoid lying down immediately after meals.  Pepcid 20 mg 1 po twice a day at patient request.  Hold Omeprazole for now.  High fiber, low fat diet with liberal water intake.   Advised to exercise 30 minutes 3-4 days per week.   Advised to lose 10-15 pounds in the next 6-12 months.  Labs  Colonoscopy in 1 year for surveillance, 2022.  Follow up: 3 months or sooner if symptoms worsen.    Lakia Keen, APRN  3/9/2021    Please note that portions of this note may have been completed with a voice recognition program.  Efforts were made to edit the dictations, but occasionally words are mistranscribed.

## 2021-03-10 ENCOUNTER — CONSULT (OUTPATIENT)
Dept: CARDIOLOGY | Facility: CLINIC | Age: 66
End: 2021-03-10

## 2021-03-10 VITALS
SYSTOLIC BLOOD PRESSURE: 110 MMHG | OXYGEN SATURATION: 97 % | HEIGHT: 62 IN | TEMPERATURE: 98 F | DIASTOLIC BLOOD PRESSURE: 80 MMHG | HEART RATE: 59 BPM | WEIGHT: 156 LBS | BODY MASS INDEX: 28.71 KG/M2

## 2021-03-10 DIAGNOSIS — I20.8 ANGINA OF EFFORT (HCC): Primary | ICD-10-CM

## 2021-03-10 PROBLEM — I20.89 ANGINA OF EFFORT: Status: ACTIVE | Noted: 2021-03-10

## 2021-03-10 LAB
HAV AB SER QL IA: NEGATIVE
HBV CORE AB SERPL QL IA: NEGATIVE
HBV SURFACE AB SER-ACNC: >1000 MIU/ML

## 2021-03-10 PROCEDURE — 93000 ELECTROCARDIOGRAM COMPLETE: CPT | Performed by: INTERNAL MEDICINE

## 2021-03-10 PROCEDURE — 99215 OFFICE O/P EST HI 40 MIN: CPT | Performed by: INTERNAL MEDICINE

## 2021-03-10 RX ORDER — TESTOSTERONE CYPIONATE 200 MG/ML
INJECTION INTRAMUSCULAR
COMMUNITY
Start: 2021-01-27 | End: 2021-04-21

## 2021-03-10 RX ORDER — SOLIFENACIN SUCCINATE 10 MG/1
10 TABLET, FILM COATED ORAL DAILY
COMMUNITY
Start: 2021-02-17 | End: 2021-05-18

## 2021-03-10 RX ORDER — ONDANSETRON 4 MG/1
4 TABLET, ORALLY DISINTEGRATING ORAL EVERY 8 HOURS PRN
COMMUNITY
Start: 2021-01-11 | End: 2022-02-10

## 2021-03-10 RX ORDER — ROSUVASTATIN CALCIUM 10 MG/1
10 TABLET, COATED ORAL DAILY
Qty: 30 TABLET | Refills: 0 | Status: SHIPPED | OUTPATIENT
Start: 2021-03-10 | End: 2021-03-18 | Stop reason: HOSPADM

## 2021-03-10 NOTE — PROGRESS NOTES
[facility] at [facility]  Consultation H&P  Krystina Henley  1955  [phone]  There is no work phone number on file..    VISIT DATE:  03/10/2021    PCP: Tre Lindquist MD  [address] [place] [id number]    CC:  Chief Complaint   Patient presents with   • Chest Pain   • Hyperlipidemia   • Hypertension     Previous cardiac studies and procedures:  March 2021 Anai scan myocardial perfusion imaging  · Left ventricular ejection fraction is hyperdynamic (Calculated EF > 70%). .  · Myocardial perfusion imaging indicates a large-sized, severe area of ischemia located in the anterior wall, apex and septal wall.  · Impressions are consistent with a high risk study.  · Findings consistent with an abnormal ECG stress test.    ASSESSMENT:   Diagnosis Plan   1. Angina of effort (CMS/[facility])           PLAN:  Gradual accelerating exertional angina with perfusion imaging concerning for LAD distribution ischemia.  Agree with recent addition of aspirin, beta-blockade and statin.  Recommending cardiac catheterization for more definitive evaluation of coronary anatomy.  Discussed risk, benefits, alternatives to cardiac catheterization.  The patient verbalized understanding these concepts and agrees to proceed with procedure.  Anticipate that she will need a PCSK9 inhibitor in order to obtain a target LDL of less than 70.    History of Present Illness   65-year-old female with a history of familial hyperlipidemia and family history of early coronary disease presents with gradually progressive exertional chest discomfort.  Describes low precordial chest discomfort radiating to her back and shoulders and arms with such activities as walking up an incline.  The symptoms have occurred consistently over the previous 2 years.  She has been self-medicating with nitroglycerin, she will walk until she develops limiting chest discomfort and then stop and take a sublingual nitroglycerin until her pain  "resolves.  She reports that she can walk about 1/8 of a mile on level ground before sprinting limiting chest discomfort but experiences chest discomfort which is limiting she encounters any sort of incline.  She has previously been intolerant to atorvastatin and simvastatin with regard to myalgias.  Previously prescribed rosuvastatin which she has not started taking yet.    PHYSICAL EXAMINATION:  Vitals:    03/10/21 1241   BP: 110/80   Pulse: 59   Temp: 98 °F (36.7 °C)   SpO2: 97%   Weight: 70.8 kg (156 lb)   Height: 157.5 cm (62.01\")     General Appearance:    Alert, cooperative, no distress, appears stated age   Head:    Normocephalic, without obvious abnormality, atraumatic   Eyes:    conjunctiva/corneas clear, EOM's intact, fundi     benign, both eyes   Ears:    Normal TM's and external ear canals, both ears   Nose:   Nares normal, septum midline, mucosa normal, no drainage    or sinus tenderness   Throat:   Lips, mucosa, and tongue normal; teeth and gums normal   Neck:   Supple, symmetrical, trachea midline, no adenopathy;     thyroid:  no enlargement/tenderness/nodules; no carotid    bruit or JVD   Back:     Symmetric, no curvature, ROM normal, no CVA tenderness   Lungs:     Clear to auscultation bilaterally, respirations unlabored   Chest Wall:    No tenderness or deformity    Heart:    Regular rate and rhythm, S1 and S2 normal, no murmur, rub   or gallop, normal carotid impulse bilaterally without bruit.   Abdomen:     Soft, non-tender, bowel sounds active all four quadrants,     no masses, no organomegaly   Extremities:   Extremities normal, atraumatic, no cyanosis or edema   Pulses:   2+ and symmetric all extremities   Skin:   Skin color, texture, turgor normal, no rashes or lesions   Lymph nodes:   Cervical, supraclavicular, and axillary nodes normal   Neurologic:   normal strength, sensation intact     throughout       Diagnostic Data:    ECG 12 Lead    Date/Time: 3/10/2021 1:18 PM  Performed by: Cj" Ernesto RIOJAS III, MD  Authorized by: Ernesto Corona III, MD   Comparison: compared with previous ECG from 2/25/2021  Similar to previous ECG  Rhythm: sinus rhythm  Q waves: III      Clinical impression: abnormal EKG          Lab Results   Component Value Date    CHLPL 320 (H) 02/24/2021    TRIG 88 02/24/2021    HDL 83 (H) 02/24/2021     Lab Results   Component Value Date    GLUCOSE 80 03/09/2021    BUN 19 03/09/2021    CREATININE 0.76 03/09/2021     03/09/2021    K 4.5 03/09/2021     (H) 03/09/2021    CO2 24.7 03/09/2021     No results found for: HGBA1C  Lab Results   Component Value Date    WBC 5.68 03/09/2021    HGB 12.5 03/09/2021    HCT 37.2 03/09/2021     03/09/2021       PROBLEM LIST:  Patient Active Problem List   Diagnosis   • Epigastric pain   • Arthritis   • Anxiety   • Depression   • Hyperlipidemia   • Urinary incontinence   • Vitamin D deficiency, unspecified    • Gastroesophageal reflux disease without esophagitis   • Nausea   • Heartburn   • Adenomatous polyp of colon   • Chest pain   • Anemia   • Hyperplastic polyp of sigmoid colon   • Tubulovillous adenoma   • Erosive esophagitis   • Esophageal stricture   • Abnormal results of liver function studies   • Angina of effort (CMS/HCC)       PAST MEDICAL HX  Past Medical History:   Diagnosis Date   • Anemia    • Anxiety    • Colon polyp 01/16/2014   • Depression    • Diverticulosis    • Elevated cholesterol    • Fibroid     uterine   • GERD (gastroesophageal reflux disease)    • Heartburn    • Hiatal hernia    • History of transfusion     no adverse reactions   • Hyperlipidemia    • Osteoarthritis     generalized   • Tinnitus    • Wears glasses     reading only       Allergies  No Known Allergies    Current Medications    Current Outpatient Medications:   •  aspirin (aspirin) 81 MG EC tablet, Take 1 tablet by mouth Daily., Disp: 30 tablet, Rfl: 1  •  dexamethasone sodium phosphate 120 MG/30ML solution injection, USE 1-2ML AS DIRECTED FOR  IONTOPHORESIS IN PHYSICAL THERAPY. BRING WITH YOU TO APPOINTMENTS., Disp: , Rfl:   •  famotidine (PEPCID) 20 MG tablet, Take 1 tablet by mouth 2 (Two) Times a Day., Disp: 60 tablet, Rfl: 5  •  metoprolol tartrate (LOPRESSOR) 25 MG tablet, Take 1 tablet by mouth 2 (Two) Times a Day., Disp: 60 tablet, Rfl: 0  •  nitroglycerin (Nitrostat) 0.4 MG SL tablet, Place 1 tablet under the tongue Every 5 (Five) Minutes As Needed for Chest Pain. Take no more than 3 doses in 15 minutes., Disp: 12 tablet, Rfl: 12  •  ondansetron ODT (ZOFRAN-ODT) 4 MG disintegrating tablet, , Disp: , Rfl:   •  rosuvastatin (CRESTOR) 10 MG tablet, Take 1 tablet by mouth Daily. , Disp: 30 tablet, Rfl: 0  •  solifenacin (VESICARE) 10 MG tablet, Take 10 mg by mouth Daily., Disp: , Rfl:          ROS  Review of Systems   Cardiovascular: Positive for chest pain and dyspnea on exertion.       All other body systems reviewed and are negative    SOCIAL HX  Social History     Socioeconomic History   • Marital status:      Spouse name: Not on file   • Number of children: Not on file   • Years of education: Not on file   • Highest education level: Not on file   Tobacco Use   • Smoking status: Former Smoker     Packs/day: 1.00     Years: 10.00     Pack years: 10.00     Types: Cigarettes     Quit date: 2016     Years since quittin.6   • Smokeless tobacco: Never Used   Vaping Use   • Vaping Use: Never used   Substance and Sexual Activity   • Alcohol use: Yes     Comment: social   • Drug use: No   • Sexual activity: Defer     Partners: Male     Birth control/protection: Post-menopausal       FAMILY HX  Family History   Problem Relation Age of Onset   • Lung cancer Mother    • Lung cancer Father    • Prostate cancer Brother         x 3 brothers   • Heart attack Brother         x 2 brothers    • Colon cancer Neg Hx    • Cirrhosis Neg Hx    • Liver disease Neg Hx    • Liver cancer Neg Hx    • Crohn's disease Neg Hx              Ernesto Corona III,  MD, FACC

## 2021-03-15 ENCOUNTER — LAB (OUTPATIENT)
Dept: LAB | Facility: HOSPITAL | Age: 66
End: 2021-03-15

## 2021-03-15 ENCOUNTER — TRANSCRIBE ORDERS (OUTPATIENT)
Dept: LAB | Facility: HOSPITAL | Age: 66
End: 2021-03-15

## 2021-03-15 DIAGNOSIS — Z01.818 PRE-OP EXAMINATION: ICD-10-CM

## 2021-03-15 DIAGNOSIS — Z01.818 PRE-OP EXAMINATION: Primary | ICD-10-CM

## 2021-03-15 PROCEDURE — C9803 HOPD COVID-19 SPEC COLLECT: HCPCS

## 2021-03-15 PROCEDURE — U0004 COV-19 TEST NON-CDC HGH THRU: HCPCS

## 2021-03-15 PROCEDURE — U0005 INFEC AGEN DETEC AMPLI PROBE: HCPCS

## 2021-03-16 ENCOUNTER — PREP FOR SURGERY (OUTPATIENT)
Dept: OTHER | Facility: HOSPITAL | Age: 66
End: 2021-03-16

## 2021-03-16 DIAGNOSIS — R94.39 ABNORMAL STRESS TEST: Primary | ICD-10-CM

## 2021-03-16 LAB — SARS-COV-2 RNA NOSE QL NAA+PROBE: NOT DETECTED

## 2021-03-16 RX ORDER — ASPIRIN 81 MG/1
324 TABLET, CHEWABLE ORAL ONCE
Status: CANCELLED | OUTPATIENT
Start: 2021-03-16 | End: 2021-03-16

## 2021-03-16 RX ORDER — SODIUM CHLORIDE 0.9 % (FLUSH) 0.9 %
3 SYRINGE (ML) INJECTION EVERY 12 HOURS SCHEDULED
Status: CANCELLED | OUTPATIENT
Start: 2021-03-16

## 2021-03-16 RX ORDER — ASPIRIN 81 MG/1
81 TABLET ORAL DAILY
Status: CANCELLED | OUTPATIENT
Start: 2021-03-17

## 2021-03-16 RX ORDER — NITROGLYCERIN 0.4 MG/1
0.4 TABLET SUBLINGUAL
Status: CANCELLED | OUTPATIENT
Start: 2021-03-16

## 2021-03-16 RX ORDER — SODIUM CHLORIDE 9 MG/ML
3 INJECTION, SOLUTION INTRAVENOUS CONTINUOUS
Status: CANCELLED | OUTPATIENT
Start: 2021-03-16 | End: 2021-03-16

## 2021-03-16 RX ORDER — ONDANSETRON 2 MG/ML
4 INJECTION INTRAMUSCULAR; INTRAVENOUS EVERY 6 HOURS PRN
Status: CANCELLED | OUTPATIENT
Start: 2021-03-16

## 2021-03-16 RX ORDER — LIDOCAINE HYDROCHLORIDE 10 MG/ML
0.1 INJECTION, SOLUTION EPIDURAL; INFILTRATION; INTRACAUDAL; PERINEURAL ONCE AS NEEDED
Status: CANCELLED | OUTPATIENT
Start: 2021-03-16

## 2021-03-16 RX ORDER — SODIUM CHLORIDE 0.9 % (FLUSH) 0.9 %
10 SYRINGE (ML) INJECTION AS NEEDED
Status: CANCELLED | OUTPATIENT
Start: 2021-03-16

## 2021-03-18 ENCOUNTER — APPOINTMENT (OUTPATIENT)
Dept: CARDIOLOGY | Facility: HOSPITAL | Age: 66
End: 2021-03-18

## 2021-03-18 ENCOUNTER — HOSPITAL ENCOUNTER (OUTPATIENT)
Facility: HOSPITAL | Age: 66
Discharge: HOME | End: 2021-03-18
Attending: INTERNAL MEDICINE | Admitting: INTERNAL MEDICINE

## 2021-03-18 VITALS
SYSTOLIC BLOOD PRESSURE: 107 MMHG | WEIGHT: 157 LBS | TEMPERATURE: 97.4 F | BODY MASS INDEX: 28.89 KG/M2 | RESPIRATION RATE: 18 BRPM | OXYGEN SATURATION: 95 % | HEART RATE: 66 BPM | DIASTOLIC BLOOD PRESSURE: 65 MMHG | HEIGHT: 62 IN

## 2021-03-18 DIAGNOSIS — R94.39 ABNORMAL STRESS TEST: ICD-10-CM

## 2021-03-18 DIAGNOSIS — I20.8 ANGINA OF EFFORT (HCC): ICD-10-CM

## 2021-03-18 PROBLEM — I10 ESSENTIAL HYPERTENSION: Status: ACTIVE | Noted: 2021-03-18

## 2021-03-18 PROBLEM — E78.2 MIXED HYPERLIPIDEMIA: Status: ACTIVE | Noted: 2020-01-14

## 2021-03-18 LAB
ANION GAP SERPL CALCULATED.3IONS-SCNC: 9 MMOL/L (ref 5–15)
ASCENDING AORTA: 3.6 CM
BH CV ECHO MEAS - AO MAX PG (FULL): 4.5 MMHG
BH CV ECHO MEAS - AO MAX PG: 7 MMHG
BH CV ECHO MEAS - AO MEAN PG (FULL): 3 MMHG
BH CV ECHO MEAS - AO MEAN PG: 4 MMHG
BH CV ECHO MEAS - AO ROOT AREA (BSA CORRECTED): 1.8
BH CV ECHO MEAS - AO ROOT AREA: 7.5 CM^2
BH CV ECHO MEAS - AO ROOT DIAM: 3.1 CM
BH CV ECHO MEAS - AO V2 MAX: 136 CM/SEC
BH CV ECHO MEAS - AO V2 MEAN: 92.9 CM/SEC
BH CV ECHO MEAS - AO V2 VTI: 35.6 CM
BH CV ECHO MEAS - ASC AORTA: 3.6 CM
BH CV ECHO MEAS - AVA(I,A): 2 CM^2
BH CV ECHO MEAS - AVA(I,D): 2 CM^2
BH CV ECHO MEAS - AVA(V,A): 1.8 CM^2
BH CV ECHO MEAS - AVA(V,D): 1.8 CM^2
BH CV ECHO MEAS - BSA(HAYCOCK): 1.8 M^2
BH CV ECHO MEAS - BSA(HAYCOCK): 1.8 M^2
BH CV ECHO MEAS - BSA: 1.7 M^2
BH CV ECHO MEAS - BSA: 1.7 M^2
BH CV ECHO MEAS - BZI_BMI: 28.7 KILOGRAMS/M^2
BH CV ECHO MEAS - BZI_BMI: 28.7 KILOGRAMS/M^2
BH CV ECHO MEAS - BZI_METRIC_HEIGHT: 157.5 CM
BH CV ECHO MEAS - BZI_METRIC_HEIGHT: 157.5 CM
BH CV ECHO MEAS - BZI_METRIC_WEIGHT: 71.2 KG
BH CV ECHO MEAS - BZI_METRIC_WEIGHT: 71.2 KG
BH CV ECHO MEAS - EDV(CUBED): 79.5 ML
BH CV ECHO MEAS - EDV(MOD-SP2): 37.6 ML
BH CV ECHO MEAS - EDV(MOD-SP4): 59.8 ML
BH CV ECHO MEAS - EDV(TEICH): 83.1 ML
BH CV ECHO MEAS - EF(CUBED): 82.6 %
BH CV ECHO MEAS - EF(MOD-BP): 67.9 %
BH CV ECHO MEAS - EF(MOD-SP2): 67.8 %
BH CV ECHO MEAS - EF(MOD-SP4): 67.7 %
BH CV ECHO MEAS - EF(TEICH): 75.7 %
BH CV ECHO MEAS - ESV(CUBED): 13.8 ML
BH CV ECHO MEAS - ESV(MOD-SP2): 12.1 ML
BH CV ECHO MEAS - ESV(MOD-SP4): 19.3 ML
BH CV ECHO MEAS - ESV(TEICH): 20.2 ML
BH CV ECHO MEAS - FS: 44.2 %
BH CV ECHO MEAS - IVS/LVPW: 1
BH CV ECHO MEAS - IVSD: 1 CM
BH CV ECHO MEAS - LA DIMENSION: 3.4 CM
BH CV ECHO MEAS - LA/AO: 1.1
BH CV ECHO MEAS - LAD MAJOR: 4.6 CM
BH CV ECHO MEAS - LAT PEAK E' VEL: 8.9 CM/SEC
BH CV ECHO MEAS - LATERAL E/E' RATIO: 8.3
BH CV ECHO MEAS - LV DIASTOLIC VOL/BSA (35-75): 34.7 ML/M^2
BH CV ECHO MEAS - LV IVRT: 0.11 SEC
BH CV ECHO MEAS - LV MASS(C)D: 142.5 GRAMS
BH CV ECHO MEAS - LV MASS(C)DI: 82.6 GRAMS/M^2
BH CV ECHO MEAS - LV MAX PG: 2.5 MMHG
BH CV ECHO MEAS - LV MEAN PG: 1 MMHG
BH CV ECHO MEAS - LV SYSTOLIC VOL/BSA (12-30): 11.2 ML/M^2
BH CV ECHO MEAS - LV V1 MAX: 79.5 CM/SEC
BH CV ECHO MEAS - LV V1 MEAN: 49.8 CM/SEC
BH CV ECHO MEAS - LV V1 VTI: 23 CM
BH CV ECHO MEAS - LVIDD: 4.3 CM
BH CV ECHO MEAS - LVIDS: 2.4 CM
BH CV ECHO MEAS - LVLD AP2: 6.7 CM
BH CV ECHO MEAS - LVLD AP4: 7.1 CM
BH CV ECHO MEAS - LVLS AP2: 5 CM
BH CV ECHO MEAS - LVLS AP4: 5.4 CM
BH CV ECHO MEAS - LVOT AREA (M): 3.1 CM^2
BH CV ECHO MEAS - LVOT AREA: 3.1 CM^2
BH CV ECHO MEAS - LVOT DIAM: 2 CM
BH CV ECHO MEAS - LVPWD: 1 CM
BH CV ECHO MEAS - MED PEAK E' VEL: 7 CM/SEC
BH CV ECHO MEAS - MEDIAL E/E' RATIO: 10.6
BH CV ECHO MEAS - MV A MAX VEL: 91.3 CM/SEC
BH CV ECHO MEAS - MV DEC SLOPE: 225.5 CM/SEC^2
BH CV ECHO MEAS - MV DEC TIME: 0.31 SEC
BH CV ECHO MEAS - MV E MAX VEL: 73.7 CM/SEC
BH CV ECHO MEAS - MV E/A: 0.81
BH CV ECHO MEAS - MV MAX PG: 3.6 MMHG
BH CV ECHO MEAS - MV MEAN PG: 1 MMHG
BH CV ECHO MEAS - MV P1/2T MAX VEL: 80.6 CM/SEC
BH CV ECHO MEAS - MV P1/2T: 104.7 MSEC
BH CV ECHO MEAS - MV V2 MAX: 94.8 CM/SEC
BH CV ECHO MEAS - MV V2 MEAN: 50.9 CM/SEC
BH CV ECHO MEAS - MV V2 VTI: 31.5 CM
BH CV ECHO MEAS - MVA P1/2T LCG: 2.7 CM^2
BH CV ECHO MEAS - MVA(P1/2T): 2.1 CM^2
BH CV ECHO MEAS - MVA(VTI): 2.3 CM^2
BH CV ECHO MEAS - PA ACC TIME: 0.26 SEC
BH CV ECHO MEAS - PA MAX PG: 1.8 MMHG
BH CV ECHO MEAS - PA PR(ACCEL): -36.7 MMHG
BH CV ECHO MEAS - PA V2 MAX: 67.5 CM/SEC
BH CV ECHO MEAS - RAP SYSTOLE: 3 MMHG
BH CV ECHO MEAS - RVSP: 33.7 MMHG
BH CV ECHO MEAS - SI(AO): 155.8 ML/M^2
BH CV ECHO MEAS - SI(CUBED): 38.1 ML/M^2
BH CV ECHO MEAS - SI(LVOT): 41.9 ML/M^2
BH CV ECHO MEAS - SI(MOD-SP2): 14.8 ML/M^2
BH CV ECHO MEAS - SI(MOD-SP4): 23.5 ML/M^2
BH CV ECHO MEAS - SI(TEICH): 36.5 ML/M^2
BH CV ECHO MEAS - SV(AO): 268.7 ML
BH CV ECHO MEAS - SV(CUBED): 65.7 ML
BH CV ECHO MEAS - SV(LVOT): 72.3 ML
BH CV ECHO MEAS - SV(MOD-SP2): 25.5 ML
BH CV ECHO MEAS - SV(MOD-SP4): 40.5 ML
BH CV ECHO MEAS - SV(TEICH): 62.9 ML
BH CV ECHO MEAS - TAPSE (>1.6): 2.5 CM
BH CV ECHO MEAS - TR MAX PG: 30.7 MMHG
BH CV ECHO MEAS - TR MAX VEL: 277 CM/SEC
BH CV ECHO MEASUREMENTS AVERAGE E/E' RATIO: 9.27
BH CV VAS BP RIGHT ARM: NORMAL MMHG
BH CV XLRA - RV BASE: 3.6 CM
BH CV XLRA - RV LENGTH: 5.5 CM
BH CV XLRA - RV MID: 3.3 CM
BH CV XLRA - TDI S': 10.2 CM/SEC
BH CV XLRA MEAS LEFT DIST CCA EDV: 22.4 CM/SEC
BH CV XLRA MEAS LEFT DIST CCA PSV: 58 CM/SEC
BH CV XLRA MEAS LEFT DIST ICA EDV: 25.1 CM/SEC
BH CV XLRA MEAS LEFT DIST ICA PSV: 50.6 CM/SEC
BH CV XLRA MEAS LEFT ICA/CCA RATIO: 2.82
BH CV XLRA MEAS LEFT MID CCA EDV: 25.9 CM/SEC
BH CV XLRA MEAS LEFT MID CCA PSV: 68.9 CM/SEC
BH CV XLRA MEAS LEFT MID ICA EDV: 42.3 CM/SEC
BH CV XLRA MEAS LEFT MID ICA PSV: 77.4 CM/SEC
BH CV XLRA MEAS LEFT PROX CCA EDV: 17 CM/SEC
BH CV XLRA MEAS LEFT PROX CCA PSV: 81.8 CM/SEC
BH CV XLRA MEAS LEFT PROX ECA EDV: 25.1 CM/SEC
BH CV XLRA MEAS LEFT PROX ECA PSV: 136 CM/SEC
BH CV XLRA MEAS LEFT PROX ICA EDV: 62.9 CM/SEC
BH CV XLRA MEAS LEFT PROX ICA PSV: 194 CM/SEC
BH CV XLRA MEAS LEFT PROX SCLA EDV: 0 CM/SEC
BH CV XLRA MEAS LEFT PROX SCLA PSV: 138 CM/SEC
BH CV XLRA MEAS LEFT VERTEBRAL A EDV: 13.5 CM/SEC
BH CV XLRA MEAS LEFT VERTEBRAL A PSV: 33.6 CM/SEC
BH CV XLRA MEAS RIGHT DIST CCA EDV: 24.1 CM/SEC
BH CV XLRA MEAS RIGHT DIST CCA PSV: 60.9 CM/SEC
BH CV XLRA MEAS RIGHT DIST ICA EDV: 18.9 CM/SEC
BH CV XLRA MEAS RIGHT DIST ICA PSV: 50.1 CM/SEC
BH CV XLRA MEAS RIGHT ICA/CCA RATIO: 1.36
BH CV XLRA MEAS RIGHT MID CCA EDV: 27.5 CM/SEC
BH CV XLRA MEAS RIGHT MID CCA PSV: 76.7 CM/SEC
BH CV XLRA MEAS RIGHT MID ICA EDV: 46.2 CM/SEC
BH CV XLRA MEAS RIGHT MID ICA PSV: 104 CM/SEC
BH CV XLRA MEAS RIGHT PROX CCA EDV: 18.7 CM/SEC
BH CV XLRA MEAS RIGHT PROX CCA PSV: 64.9 CM/SEC
BH CV XLRA MEAS RIGHT PROX ECA EDV: 8.3 CM/SEC
BH CV XLRA MEAS RIGHT PROX ECA PSV: 48.7 CM/SEC
BH CV XLRA MEAS RIGHT PROX ICA EDV: 31.5 CM/SEC
BH CV XLRA MEAS RIGHT PROX ICA PSV: 61.9 CM/SEC
BH CV XLRA MEAS RIGHT PROX SCLA EDV: 0 CM/SEC
BH CV XLRA MEAS RIGHT PROX SCLA PSV: 92.6 CM/SEC
BH CV XLRA MEAS RIGHT VERTEBRAL A EDV: 19.8 CM/SEC
BH CV XLRA MEAS RIGHT VERTEBRAL A PSV: 56.6 CM/SEC
BUN SERPL-MCNC: 26 MG/DL (ref 8–23)
BUN/CREAT SERPL: 32.1 (ref 7–25)
CALCIUM SPEC-SCNC: 8.8 MG/DL (ref 8.6–10.5)
CHLORIDE SERPL-SCNC: 105 MMOL/L (ref 98–107)
CO2 SERPL-SCNC: 28 MMOL/L (ref 22–29)
CREAT SERPL-MCNC: 0.81 MG/DL (ref 0.57–1)
DEPRECATED RDW RBC AUTO: 47.1 FL (ref 37–54)
ERYTHROCYTE [DISTWIDTH] IN BLOOD BY AUTOMATED COUNT: 14.5 % (ref 12.3–15.4)
GFR SERPL CREATININE-BSD FRML MDRD: 71 ML/MIN/1.73
GLUCOSE SERPL-MCNC: 85 MG/DL (ref 65–99)
HCT VFR BLD AUTO: 36.5 % (ref 34–46.6)
HGB BLD-MCNC: 11.6 G/DL (ref 12–15.9)
IVRT: 109 MSEC
LEFT ATRIUM VOLUME INDEX: 21.2 ML/M^2
LEFT ATRIUM VOLUME: 36.6 ML
MAXIMAL PREDICTED HEART RATE: 154 BPM
MCH RBC QN AUTO: 28.6 PG (ref 26.6–33)
MCHC RBC AUTO-ENTMCNC: 31.8 G/DL (ref 31.5–35.7)
MCV RBC AUTO: 90.1 FL (ref 79–97)
PA ADP PRP-ACNC: 290 PRU
PLATELET # BLD AUTO: 265 10*3/MM3 (ref 140–450)
PMV BLD AUTO: 10.3 FL (ref 6–12)
POTASSIUM SERPL-SCNC: 4.2 MMOL/L (ref 3.5–5.2)
RBC # BLD AUTO: 4.05 10*6/MM3 (ref 3.77–5.28)
RIGHT ARM BP: NORMAL MMHG
SODIUM SERPL-SCNC: 142 MMOL/L (ref 136–145)
STRESS TARGET HR: 131 BPM
WBC # BLD AUTO: 5.05 10*3/MM3 (ref 3.4–10.8)

## 2021-03-18 PROCEDURE — 0 IOPAMIDOL PER 1 ML: Performed by: INTERNAL MEDICINE

## 2021-03-18 PROCEDURE — 93306 TTE W/DOPPLER COMPLETE: CPT | Performed by: INTERNAL MEDICINE

## 2021-03-18 PROCEDURE — 93458 L HRT ARTERY/VENTRICLE ANGIO: CPT | Performed by: INTERNAL MEDICINE

## 2021-03-18 PROCEDURE — 93306 TTE W/DOPPLER COMPLETE: CPT

## 2021-03-18 PROCEDURE — 25010000002 MIDAZOLAM PER 1 MG: Performed by: INTERNAL MEDICINE

## 2021-03-18 PROCEDURE — 93880 EXTRACRANIAL BILAT STUDY: CPT

## 2021-03-18 PROCEDURE — 85027 COMPLETE CBC AUTOMATED: CPT | Performed by: NURSE PRACTITIONER

## 2021-03-18 PROCEDURE — 85576 BLOOD PLATELET AGGREGATION: CPT | Performed by: PHYSICIAN ASSISTANT

## 2021-03-18 PROCEDURE — 80048 BASIC METABOLIC PNL TOTAL CA: CPT | Performed by: NURSE PRACTITIONER

## 2021-03-18 PROCEDURE — 25010000002 HEPARIN (PORCINE) PER 1000 UNITS: Performed by: INTERNAL MEDICINE

## 2021-03-18 PROCEDURE — 25010000002 FENTANYL CITRATE (PF) 100 MCG/2ML SOLUTION: Performed by: INTERNAL MEDICINE

## 2021-03-18 PROCEDURE — C1769 GUIDE WIRE: HCPCS | Performed by: INTERNAL MEDICINE

## 2021-03-18 PROCEDURE — C1894 INTRO/SHEATH, NON-LASER: HCPCS | Performed by: INTERNAL MEDICINE

## 2021-03-18 PROCEDURE — 93880 EXTRACRANIAL BILAT STUDY: CPT | Performed by: INTERNAL MEDICINE

## 2021-03-18 PROCEDURE — C1887 CATHETER, GUIDING: HCPCS | Performed by: INTERNAL MEDICINE

## 2021-03-18 RX ORDER — ASPIRIN 81 MG/1
324 TABLET, CHEWABLE ORAL ONCE
Status: COMPLETED | OUTPATIENT
Start: 2021-03-18 | End: 2021-03-18

## 2021-03-18 RX ORDER — FENTANYL CITRATE 50 UG/ML
INJECTION, SOLUTION INTRAMUSCULAR; INTRAVENOUS AS NEEDED
Status: DISCONTINUED | OUTPATIENT
Start: 2021-03-18 | End: 2021-03-18 | Stop reason: HOSPADM

## 2021-03-18 RX ORDER — NITROGLYCERIN 0.4 MG/1
0.4 TABLET SUBLINGUAL
Status: DISCONTINUED | OUTPATIENT
Start: 2021-03-18 | End: 2021-03-18 | Stop reason: HOSPADM

## 2021-03-18 RX ORDER — SODIUM CHLORIDE 0.9 % (FLUSH) 0.9 %
10 SYRINGE (ML) INJECTION AS NEEDED
Status: DISCONTINUED | OUTPATIENT
Start: 2021-03-18 | End: 2021-03-18 | Stop reason: HOSPADM

## 2021-03-18 RX ORDER — ONDANSETRON 2 MG/ML
4 INJECTION INTRAMUSCULAR; INTRAVENOUS EVERY 6 HOURS PRN
Status: DISCONTINUED | OUTPATIENT
Start: 2021-03-18 | End: 2021-03-18 | Stop reason: HOSPADM

## 2021-03-18 RX ORDER — LIDOCAINE HYDROCHLORIDE 10 MG/ML
INJECTION, SOLUTION EPIDURAL; INFILTRATION; INTRACAUDAL; PERINEURAL AS NEEDED
Status: DISCONTINUED | OUTPATIENT
Start: 2021-03-18 | End: 2021-03-18 | Stop reason: HOSPADM

## 2021-03-18 RX ORDER — ROSUVASTATIN CALCIUM 40 MG/1
40 TABLET, COATED ORAL DAILY
Qty: 30 TABLET | Refills: 11 | Status: SHIPPED | OUTPATIENT
Start: 2021-03-18 | End: 2021-04-21

## 2021-03-18 RX ORDER — MIDAZOLAM HYDROCHLORIDE 1 MG/ML
INJECTION INTRAMUSCULAR; INTRAVENOUS AS NEEDED
Status: DISCONTINUED | OUTPATIENT
Start: 2021-03-18 | End: 2021-03-18 | Stop reason: HOSPADM

## 2021-03-18 RX ORDER — SODIUM CHLORIDE 0.9 % (FLUSH) 0.9 %
3 SYRINGE (ML) INJECTION EVERY 12 HOURS SCHEDULED
Status: DISCONTINUED | OUTPATIENT
Start: 2021-03-18 | End: 2021-03-18 | Stop reason: HOSPADM

## 2021-03-18 RX ORDER — ACETAMINOPHEN 325 MG/1
650 TABLET ORAL EVERY 4 HOURS PRN
Status: DISCONTINUED | OUTPATIENT
Start: 2021-03-18 | End: 2021-03-18 | Stop reason: HOSPADM

## 2021-03-18 RX ORDER — SODIUM CHLORIDE 9 MG/ML
3 INJECTION, SOLUTION INTRAVENOUS CONTINUOUS
Status: ACTIVE | OUTPATIENT
Start: 2021-03-18 | End: 2021-03-18

## 2021-03-18 RX ORDER — ASPIRIN 81 MG/1
81 TABLET ORAL DAILY
Status: DISCONTINUED | OUTPATIENT
Start: 2021-03-19 | End: 2021-03-18 | Stop reason: HOSPADM

## 2021-03-18 RX ORDER — LIDOCAINE HYDROCHLORIDE 10 MG/ML
0.1 INJECTION, SOLUTION EPIDURAL; INFILTRATION; INTRACAUDAL; PERINEURAL ONCE AS NEEDED
Status: DISCONTINUED | OUTPATIENT
Start: 2021-03-18 | End: 2021-03-18 | Stop reason: HOSPADM

## 2021-03-18 RX ADMIN — ASPIRIN 324 MG: 81 TABLET, CHEWABLE ORAL at 06:49

## 2021-03-18 RX ADMIN — SODIUM CHLORIDE 3 ML/KG/HR: 9 INJECTION, SOLUTION INTRAVENOUS at 06:49

## 2021-03-19 ENCOUNTER — DOCUMENTATION (OUTPATIENT)
Dept: CARDIAC REHAB | Facility: HOSPITAL | Age: 66
End: 2021-03-19

## 2021-03-19 NOTE — PROGRESS NOTES
Order received for Phase II Cardiac Rehab. Staff will follow up after revascularization surgery.

## 2021-03-22 ENCOUNTER — HOSPITAL ENCOUNTER (OUTPATIENT)
Facility: HOSPITAL | Age: 66
Setting detail: SURGERY ADMIT
End: 2021-03-22
Attending: THORACIC SURGERY (CARDIOTHORACIC VASCULAR SURGERY) | Admitting: THORACIC SURGERY (CARDIOTHORACIC VASCULAR SURGERY)

## 2021-03-22 ENCOUNTER — PREP FOR SURGERY (OUTPATIENT)
Dept: OTHER | Facility: HOSPITAL | Age: 66
End: 2021-03-22

## 2021-03-22 ENCOUNTER — OFFICE VISIT (OUTPATIENT)
Dept: INTERNAL MEDICINE | Facility: CLINIC | Age: 66
End: 2021-03-22

## 2021-03-22 VITALS
HEIGHT: 62 IN | WEIGHT: 157 LBS | DIASTOLIC BLOOD PRESSURE: 62 MMHG | SYSTOLIC BLOOD PRESSURE: 112 MMHG | OXYGEN SATURATION: 96 % | BODY MASS INDEX: 28.89 KG/M2 | HEART RATE: 62 BPM | TEMPERATURE: 96.4 F

## 2021-03-22 DIAGNOSIS — D64.9 ANEMIA, UNSPECIFIED TYPE: ICD-10-CM

## 2021-03-22 DIAGNOSIS — I25.119 CORONARY ARTERY DISEASE INVOLVING NATIVE CORONARY ARTERY OF NATIVE HEART WITH ANGINA PECTORIS (HCC): Primary | ICD-10-CM

## 2021-03-22 DIAGNOSIS — E78.2 MIXED HYPERLIPIDEMIA: Primary | ICD-10-CM

## 2021-03-22 DIAGNOSIS — K21.9 GASTROESOPHAGEAL REFLUX DISEASE WITHOUT ESOPHAGITIS: ICD-10-CM

## 2021-03-22 DIAGNOSIS — I25.119 CORONARY ARTERY DISEASE INVOLVING NATIVE CORONARY ARTERY OF NATIVE HEART WITH ANGINA PECTORIS (HCC): ICD-10-CM

## 2021-03-22 DIAGNOSIS — I10 ESSENTIAL HYPERTENSION: ICD-10-CM

## 2021-03-22 PROBLEM — R94.39 ABNORMAL STRESS TEST: Status: RESOLVED | Noted: 2021-03-18 | Resolved: 2021-03-22

## 2021-03-22 PROBLEM — R12 HEARTBURN: Status: RESOLVED | Noted: 2020-05-29 | Resolved: 2021-03-22

## 2021-03-22 PROBLEM — R07.9 CHEST PAIN: Status: RESOLVED | Noted: 2021-02-25 | Resolved: 2021-03-22

## 2021-03-22 PROBLEM — R11.0 NAUSEA: Status: RESOLVED | Noted: 2020-05-29 | Resolved: 2021-03-22

## 2021-03-22 PROCEDURE — 99214 OFFICE O/P EST MOD 30 MIN: CPT | Performed by: INTERNAL MEDICINE

## 2021-03-22 RX ORDER — MELOXICAM 15 MG/1
15 TABLET ORAL DAILY
COMMUNITY
Start: 2021-03-12 | End: 2021-04-21

## 2021-03-22 NOTE — PROGRESS NOTES
Subjective   Krystina Henley is a 65 y.o. female.     No chief complaint on file.      History of Present Illness   Patient here for follow-up.  Recent chest pain resolved after nitroglycerin.  Stress test showed abnormal.  Cardiac catheter showed significant blockages.  Patient is going to have a bypass surgery.  Hemoglobin slightly decreased.  Hyperlipidemia on medication.  Nausea normal.  Patient was also diagnosed carotid stenosis moderate to severe.  GERD stable on medication.    Current Outpatient Medications:   •  aspirin (aspirin) 81 MG EC tablet, Take 1 tablet by mouth Daily., Disp: 30 tablet, Rfl: 1  •  dexamethasone sodium phosphate 120 MG/30ML solution injection, USE 1-2ML AS DIRECTED FOR IONTOPHORESIS IN PHYSICAL THERAPY. BRING WITH YOU TO APPOINTMENTS., Disp: , Rfl:   •  famotidine (PEPCID) 20 MG tablet, Take 1 tablet by mouth 2 (Two) Times a Day., Disp: 60 tablet, Rfl: 5  •  metoprolol tartrate (LOPRESSOR) 25 MG tablet, Take 1 tablet by mouth 2 (Two) Times a Day., Disp: 60 tablet, Rfl: 0  •  nitroglycerin (Nitrostat) 0.4 MG SL tablet, Place 1 tablet under the tongue Every 5 (Five) Minutes As Needed for Chest Pain. Take no more than 3 doses in 15 minutes., Disp: 12 tablet, Rfl: 12  •  ondansetron ODT (ZOFRAN-ODT) 4 MG disintegrating tablet, Place 4 mg on the tongue Every 8 (Eight) Hours As Needed., Disp: , Rfl:   •  rosuvastatin (CRESTOR) 40 MG tablet, Take 1 tablet by mouth Daily., Disp: 30 tablet, Rfl: 11  •  solifenacin (VESICARE) 10 MG tablet, Take 10 mg by mouth Daily., Disp: , Rfl:     The following portions of the patient's history were reviewed and updated as appropriate: allergies, current medications, past family history, past medical history, past social history, past surgical history and problem list.    Review of Systems   Constitutional: Negative.    Respiratory: Negative.    Cardiovascular: Negative.    Gastrointestinal: Negative.    Musculoskeletal: Negative.    Skin: Negative.     Neurological: Negative.    Psychiatric/Behavioral: Negative.        Objective   Physical Exam  Cardiovascular:      Rate and Rhythm: Normal rate and regular rhythm.      Heart sounds: Normal heart sounds.   Pulmonary:      Effort: Pulmonary effort is normal.      Breath sounds: Normal breath sounds.   Abdominal:      General: Bowel sounds are normal.   Musculoskeletal:      Cervical back: Neck supple.   Skin:     General: Skin is warm.   Neurological:      Mental Status: She is alert and oriented to person, place, and time.         All tests have been reviewed.    Assessment/Plan   There are no diagnoses linked to this encounter.          Encounter for screening mammogram for malignant neoplasm of breast  probable familial hyperlipidemia on Lipitor    Nausea  resolved    Carotid stenosis, cont med and follow cardio      GERD cont pepcid      CAD on stress test, pendign CABG     Adenomatous polyp of colon, unspecified part of colon     Anemia, Hb 11.5 watch      Wellness in 2 mo

## 2021-03-23 ENCOUNTER — PREP FOR SURGERY (OUTPATIENT)
Dept: OTHER | Facility: HOSPITAL | Age: 66
End: 2021-03-23

## 2021-03-23 DIAGNOSIS — I25.10 CORONARY ARTERY DISEASE INVOLVING NATIVE CORONARY ARTERY OF NATIVE HEART WITHOUT ANGINA PECTORIS: Primary | ICD-10-CM

## 2021-03-23 RX ORDER — CHLORHEXIDINE GLUCONATE 0.12 MG/ML
15 RINSE ORAL ONCE
Status: CANCELLED | OUTPATIENT
Start: 2021-04-09 | End: 2021-04-09

## 2021-03-23 RX ORDER — CHLORHEXIDINE GLUCONATE 500 MG/1
1 CLOTH TOPICAL EVERY 12 HOURS PRN
Status: CANCELLED | OUTPATIENT
Start: 2021-04-09

## 2021-03-23 RX ORDER — ASPIRIN 325 MG
325 TABLET ORAL NIGHTLY
Status: CANCELLED | OUTPATIENT
Start: 2021-04-08 | End: 2021-04-09

## 2021-03-23 RX ORDER — ACETAMINOPHEN 325 MG/1
650 TABLET ORAL EVERY 4 HOURS PRN
Status: CANCELLED | OUTPATIENT
Start: 2021-04-09

## 2021-03-23 RX ORDER — CHLORHEXIDINE GLUCONATE 500 MG/1
1 CLOTH TOPICAL EVERY 12 HOURS PRN
Status: CANCELLED | OUTPATIENT
Start: 2021-04-08

## 2021-03-23 RX ORDER — NITROGLYCERIN 0.4 MG/1
0.4 TABLET SUBLINGUAL
Status: CANCELLED | OUTPATIENT
Start: 2021-04-09

## 2021-03-24 ENCOUNTER — APPOINTMENT (OUTPATIENT)
Dept: PREADMISSION TESTING | Facility: HOSPITAL | Age: 66
End: 2021-03-24

## 2021-04-02 ENCOUNTER — TELEPHONE (OUTPATIENT)
Dept: CARDIAC SURGERY | Facility: CLINIC | Age: 66
End: 2021-04-02

## 2021-04-02 DIAGNOSIS — R07.9 CHEST PAIN, UNSPECIFIED TYPE: ICD-10-CM

## 2021-04-02 RX ORDER — ROSUVASTATIN CALCIUM 10 MG/1
TABLET, COATED ORAL
Qty: 30 TABLET | Refills: 0 | OUTPATIENT
Start: 2021-04-02

## 2021-04-02 NOTE — TELEPHONE ENCOUNTER
Pt called and wanted to cancel surgery with Dr. Esparza on 04/09-pt stated that she is going for a 2nd opinion. She knows to call our office with any questions.

## 2021-04-08 ENCOUNTER — APPOINTMENT (OUTPATIENT)
Dept: PREADMISSION TESTING | Facility: HOSPITAL | Age: 66
End: 2021-04-08

## 2021-04-21 ENCOUNTER — HOSPITAL ENCOUNTER (OUTPATIENT)
Dept: GENERAL RADIOLOGY | Facility: HOSPITAL | Age: 66
Discharge: HOME | End: 2021-04-21
Admitting: INTERNAL MEDICINE

## 2021-04-21 ENCOUNTER — OFFICE VISIT (OUTPATIENT)
Dept: INTERNAL MEDICINE | Facility: CLINIC | Age: 66
End: 2021-04-21

## 2021-04-21 VITALS
HEART RATE: 67 BPM | DIASTOLIC BLOOD PRESSURE: 80 MMHG | TEMPERATURE: 97.3 F | SYSTOLIC BLOOD PRESSURE: 124 MMHG | WEIGHT: 148 LBS | OXYGEN SATURATION: 95 % | BODY MASS INDEX: 27.23 KG/M2 | HEIGHT: 62 IN

## 2021-04-21 DIAGNOSIS — R93.1 ABNORMAL FINDINGS ON DIAGNOSTIC IMAGING OF HEART AND CORONARY CIRCULATION: ICD-10-CM

## 2021-04-21 DIAGNOSIS — R93.89 ABNORMAL FINDINGS ON DIAGNOSTIC IMAGING OF OTHER SPECIFIED BODY STRUCTURES: ICD-10-CM

## 2021-04-21 DIAGNOSIS — Z95.1 HX OF CABG: Primary | ICD-10-CM

## 2021-04-21 DIAGNOSIS — I25.10 CORONARY ARTERY DISEASE INVOLVING NATIVE CORONARY ARTERY OF NATIVE HEART WITHOUT ANGINA PECTORIS: ICD-10-CM

## 2021-04-21 PROCEDURE — 1111F DSCHRG MED/CURRENT MED MERGE: CPT | Performed by: INTERNAL MEDICINE

## 2021-04-21 PROCEDURE — 71046 X-RAY EXAM CHEST 2 VIEWS: CPT

## 2021-04-21 PROCEDURE — 99495 TRANSJ CARE MGMT MOD F2F 14D: CPT | Performed by: INTERNAL MEDICINE

## 2021-04-21 RX ORDER — HYDROCODONE BITARTRATE AND ACETAMINOPHEN 5; 325 MG/1; MG/1
TABLET ORAL
COMMUNITY
Start: 2021-04-13 | End: 2021-05-18

## 2021-04-21 RX ORDER — TRAMADOL HYDROCHLORIDE 50 MG/1
TABLET ORAL
COMMUNITY
Start: 2021-04-15 | End: 2021-05-18

## 2021-04-21 RX ORDER — LIDOCAINE 4 G/G
1 PATCH TOPICAL DAILY
COMMUNITY
Start: 2021-04-13 | End: 2021-05-18

## 2021-04-21 RX ORDER — CLOPIDOGREL BISULFATE 75 MG/1
TABLET ORAL
COMMUNITY
Start: 2021-04-13 | End: 2021-05-24 | Stop reason: SDUPTHER

## 2021-04-21 RX ORDER — COLCHICINE 0.6 MG/1
TABLET ORAL
COMMUNITY
Start: 2021-04-13 | End: 2021-05-18

## 2021-04-21 RX ORDER — ASPIRIN 81 MG/1
81 TABLET ORAL DAILY
COMMUNITY
End: 2021-05-18

## 2021-04-21 NOTE — PROGRESS NOTES
Transitional Care Follow Up Visit  Subjective     AixaEricka Henley is a 65 y.o. female who presents for a transitional care management visit.    Within 48 business hours after discharge our office contacted her via telephone to coordinate her care and needs.      I reviewed and discussed the details of that call along with the discharge summary, hospital problems, inpatient lab results, inpatient diagnostic studies, and consultation reports with Krystina.     Current outpatient and discharge medications have been reconciled for the patient.  Reviewed by: Tre Lindquist MD      No flowsheet data found.  Risk for Readmission (LACE) No data recorded    History of Present Illness   Course During Hospital Stay:  Patient here for transition of care.  Patient was discharged from hospital in [facility] a week ago.  For coronary artery disease and bypass surgery.  Patient still has some local induration pain taking Norco and tramadol.  While in the hospital patient received the 2 units of PRBC.  Cholesterol medicine was discontinued due to liver enzyme elevation.  Patient is on Plavix now.  Denies any chest pain denies any short of breath.  Reviewed the  records from patient's electronic medical record.  Patient still feels tired no energy weakness.     The following portions of the patient's history were reviewed and updated as appropriate: allergies, current medications, past family history, past medical history, past social history, past surgical history and problem list.    Review of Systems   Constitutional: Positive for fatigue.   Respiratory: Negative.    Cardiovascular: Negative.    Gastrointestinal: Negative.    Musculoskeletal: Negative.    Skin: Negative.    Neurological: Negative.    Psychiatric/Behavioral: Negative.        Objective   Physical Exam  Cardiovascular:      Rate and Rhythm: Normal rate and regular rhythm.      Heart sounds: Normal heart sounds.   Pulmonary:      Effort: Pulmonary effort is  normal.      Breath sounds: Normal breath sounds.   Abdominal:      General: Bowel sounds are normal.   Musculoskeletal:         General: Tenderness present.      Cervical back: Neck supple.   Skin:     General: Skin is warm.      Comments: Incision healing well   Neurological:      Mental Status: She is alert and oriented to person, place, and time.         Assessment/Plan   Diagnoses and all orders for this visit:    1. Hx of CABG (Primary) continue medication.  Patient is on magnesium denies any constipation.  Patient has diarrhea now.  We will cut down to once a day medical magnesium.  Possible because of low magnesium in the hospital.  Check lab.  -     XR Chest PA & Lateral  -     CBC & Differential  -     Comprehensive Metabolic Panel  -     Magnesium  -     TSH  Pleural effusion while in the hospital repeat chest x-ray.  2. Coronary artery disease involving native coronary artery of native heart without angina pectoris follow-up with cardiology  -     TSH    3. Abnormal findings on diagnostic imaging of heart and coronary check lab circulation   -     CBC & Differential    4. Abnormal findings on diagnostic imaging of other specified body structures   -     TSH        1 mo follow up after blood test with others

## 2021-04-22 LAB
ALBUMIN SERPL-MCNC: 4.7 G/DL (ref 3.5–5.2)
ALBUMIN/GLOB SERPL: 1.6 G/DL
ALP SERPL-CCNC: 124 U/L (ref 39–117)
ALT SERPL-CCNC: 23 U/L (ref 1–33)
AST SERPL-CCNC: 25 U/L (ref 1–32)
BASOPHILS # BLD AUTO: 0.09 10*3/MM3 (ref 0–0.2)
BASOPHILS NFR BLD AUTO: 1.3 % (ref 0–1.5)
BILIRUB SERPL-MCNC: 0.4 MG/DL (ref 0–1.2)
BUN SERPL-MCNC: 14 MG/DL (ref 8–23)
BUN/CREAT SERPL: 18.2 (ref 7–25)
CALCIUM SERPL-MCNC: 9.9 MG/DL (ref 8.6–10.5)
CHLORIDE SERPL-SCNC: 98 MMOL/L (ref 98–107)
CO2 SERPL-SCNC: 26.4 MMOL/L (ref 22–29)
CREAT SERPL-MCNC: 0.77 MG/DL (ref 0.57–1)
EOSINOPHIL # BLD AUTO: 0.41 10*3/MM3 (ref 0–0.4)
EOSINOPHIL NFR BLD AUTO: 5.9 % (ref 0.3–6.2)
ERYTHROCYTE [DISTWIDTH] IN BLOOD BY AUTOMATED COUNT: 13.3 % (ref 12.3–15.4)
GLOBULIN SER CALC-MCNC: 2.9 GM/DL
GLUCOSE SERPL-MCNC: 71 MG/DL (ref 65–99)
HCT VFR BLD AUTO: 42.7 % (ref 34–46.6)
HGB BLD-MCNC: 14.1 G/DL (ref 12–15.9)
IMM GRANULOCYTES # BLD AUTO: 0.01 10*3/MM3 (ref 0–0.05)
IMM GRANULOCYTES NFR BLD AUTO: 0.1 % (ref 0–0.5)
LYMPHOCYTES # BLD AUTO: 2.28 10*3/MM3 (ref 0.7–3.1)
LYMPHOCYTES NFR BLD AUTO: 32.6 % (ref 19.6–45.3)
MAGNESIUM SERPL-MCNC: 2.4 MG/DL (ref 1.6–2.4)
MCH RBC QN AUTO: 29.8 PG (ref 26.6–33)
MCHC RBC AUTO-ENTMCNC: 33 G/DL (ref 31.5–35.7)
MCV RBC AUTO: 90.3 FL (ref 79–97)
MONOCYTES # BLD AUTO: 0.59 10*3/MM3 (ref 0.1–0.9)
MONOCYTES NFR BLD AUTO: 8.4 % (ref 5–12)
NEUTROPHILS # BLD AUTO: 3.61 10*3/MM3 (ref 1.7–7)
NEUTROPHILS NFR BLD AUTO: 51.7 % (ref 42.7–76)
NRBC BLD AUTO-RTO: 0 /100 WBC (ref 0–0.2)
PLATELET # BLD AUTO: 526 10*3/MM3 (ref 140–450)
POTASSIUM SERPL-SCNC: 4.7 MMOL/L (ref 3.5–5.2)
PROT SERPL-MCNC: 7.6 G/DL (ref 6–8.5)
RBC # BLD AUTO: 4.73 10*6/MM3 (ref 3.77–5.28)
SODIUM SERPL-SCNC: 136 MMOL/L (ref 136–145)
TSH SERPL DL<=0.005 MIU/L-ACNC: 1.84 UIU/ML (ref 0.27–4.2)
WBC # BLD AUTO: 6.99 10*3/MM3 (ref 3.4–10.8)

## 2021-05-04 ENCOUNTER — TELEPHONE (OUTPATIENT)
Dept: INTERNAL MEDICINE | Facility: CLINIC | Age: 66
End: 2021-05-04

## 2021-05-04 NOTE — TELEPHONE ENCOUNTER
Caller: Krystina Henley    Relationship: Self    Best call back number: [phone]    What does billing need from the patient: PATIENT STATES 02/24 SHE HAD BLOOD WORK COMPLETED AT [facility]. PATIENT STATES SHE RECEIVED A BILL DUE TO THIS BEING CODED AS ROUTINE LAB WORK. PATIENT STATED DR NELSON WAS CHECKING ON AN ISSUE WITH HER HEART AND THIS WAS NOT ROUTINE. PATIENT WOULD LIKE TO HAVE THIS CORRECTED AND RE CODED.

## 2021-05-07 NOTE — TELEPHONE ENCOUNTER
Diagnosis of Z00.00 has been removed and replaced with below.  Hepatitis C Antibody- Z11.59  CK- I20.8  LIPID E78.2  CMP- E78.2 & I10  CBC E78.2 & I10  TSH D64.9    WILL YOU RESUBMIT?

## 2021-05-12 ENCOUNTER — TRANSCRIBE ORDERS (OUTPATIENT)
Dept: CARDIAC REHAB | Facility: HOSPITAL | Age: 66
End: 2021-05-12

## 2021-05-12 DIAGNOSIS — Z95.1 S/P CABG (CORONARY ARTERY BYPASS GRAFT): Primary | ICD-10-CM

## 2021-05-18 ENCOUNTER — TREATMENT (OUTPATIENT)
Dept: CARDIAC REHAB | Facility: HOSPITAL | Age: 66
End: 2021-05-18

## 2021-05-18 DIAGNOSIS — Z95.1 S/P CABG X 1: Primary | ICD-10-CM

## 2021-05-18 PROCEDURE — 93798 PHYS/QHP OP CAR RHAB W/ECG: CPT

## 2021-05-18 PROCEDURE — 93797 PHYS/QHP OP CAR RHAB WO ECG: CPT

## 2021-05-18 NOTE — PROGRESS NOTES
Pt was seen today in [facility] for a Phase 2 visit.  Vital signs and session notes recorded in [employer] and will be scanned into Epic by HIM.

## 2021-05-19 ENCOUNTER — TREATMENT (OUTPATIENT)
Dept: CARDIAC REHAB | Facility: HOSPITAL | Age: 66
End: 2021-05-19

## 2021-05-19 DIAGNOSIS — Z95.1 S/P CABG X 1: Primary | ICD-10-CM

## 2021-05-19 PROCEDURE — 93798 PHYS/QHP OP CAR RHAB W/ECG: CPT

## 2021-05-19 NOTE — PROGRESS NOTES
Pt was seen today in [facility] for a Phase 2 visit.  Vital signs and session notes recorded in [employer] and will be scanned into Epic by HIM.

## 2021-05-20 ENCOUNTER — APPOINTMENT (OUTPATIENT)
Dept: CARDIAC REHAB | Facility: HOSPITAL | Age: 66
End: 2021-05-20

## 2021-05-20 ENCOUNTER — LAB (OUTPATIENT)
Dept: LAB | Facility: HOSPITAL | Age: 66
End: 2021-05-20

## 2021-05-20 ENCOUNTER — OFFICE VISIT (OUTPATIENT)
Dept: CARDIOLOGY | Facility: CLINIC | Age: 66
End: 2021-05-20

## 2021-05-20 VITALS
WEIGHT: 143.8 LBS | HEART RATE: 94 BPM | HEIGHT: 62 IN | BODY MASS INDEX: 26.46 KG/M2 | DIASTOLIC BLOOD PRESSURE: 62 MMHG | SYSTOLIC BLOOD PRESSURE: 110 MMHG | OXYGEN SATURATION: 97 %

## 2021-05-20 DIAGNOSIS — I25.119 CORONARY ARTERY DISEASE INVOLVING NATIVE CORONARY ARTERY OF NATIVE HEART WITH ANGINA PECTORIS (HCC): Primary | ICD-10-CM

## 2021-05-20 DIAGNOSIS — I10 ESSENTIAL HYPERTENSION: ICD-10-CM

## 2021-05-20 DIAGNOSIS — E78.2 MIXED HYPERLIPIDEMIA: ICD-10-CM

## 2021-05-20 LAB
ALBUMIN SERPL-MCNC: 4.1 G/DL (ref 3.5–5.2)
ALBUMIN/GLOB SERPL: 1.2 G/DL
ALP SERPL-CCNC: 83 U/L (ref 39–117)
ALT SERPL W P-5'-P-CCNC: 13 U/L (ref 1–33)
ANION GAP SERPL CALCULATED.3IONS-SCNC: 14.9 MMOL/L (ref 5–15)
AST SERPL-CCNC: 20 U/L (ref 1–32)
BILIRUB SERPL-MCNC: 0.6 MG/DL (ref 0–1.2)
BUN SERPL-MCNC: 20 MG/DL (ref 8–23)
BUN/CREAT SERPL: 23 (ref 7–25)
CALCIUM SPEC-SCNC: 9.5 MG/DL (ref 8.6–10.5)
CHLORIDE SERPL-SCNC: 102 MMOL/L (ref 98–107)
CHOLEST SERPL-MCNC: 218 MG/DL (ref 0–200)
CO2 SERPL-SCNC: 23.1 MMOL/L (ref 22–29)
CREAT SERPL-MCNC: 0.87 MG/DL (ref 0.57–1)
GFR SERPL CREATININE-BSD FRML MDRD: 65 ML/MIN/1.73
GLOBULIN UR ELPH-MCNC: 3.4 GM/DL
GLUCOSE SERPL-MCNC: 93 MG/DL (ref 65–99)
HDLC SERPL-MCNC: 62 MG/DL (ref 40–60)
LDLC SERPL CALC-MCNC: 142 MG/DL (ref 0–100)
LDLC/HDLC SERPL: 2.26 {RATIO}
POTASSIUM SERPL-SCNC: 4.2 MMOL/L (ref 3.5–5.2)
PROT SERPL-MCNC: 7.5 G/DL (ref 6–8.5)
SODIUM SERPL-SCNC: 140 MMOL/L (ref 136–145)
TRIGL SERPL-MCNC: 80 MG/DL (ref 0–150)
VLDLC SERPL-MCNC: 14 MG/DL (ref 5–40)

## 2021-05-20 PROCEDURE — 99213 OFFICE O/P EST LOW 20 MIN: CPT | Performed by: INTERNAL MEDICINE

## 2021-05-20 PROCEDURE — 80061 LIPID PANEL: CPT | Performed by: INTERNAL MEDICINE

## 2021-05-20 PROCEDURE — 36415 COLL VENOUS BLD VENIPUNCTURE: CPT | Performed by: INTERNAL MEDICINE

## 2021-05-20 PROCEDURE — 80053 COMPREHEN METABOLIC PANEL: CPT | Performed by: INTERNAL MEDICINE

## 2021-05-20 NOTE — PROGRESS NOTES
[facility] at [facility]  Office visit  Krystina Henley  1955  [phone]  There is no work phone number on file.    VISIT DATE:  5/20/2021    PCP: Tre Lindquist MD  [address] [place] [id number]    CC:  Chief Complaint   Patient presents with   • Coronary Artery Disease       Previous cardiac studies and procedures:  March 2021   Anai scan myocardial perfusion imaging  · Left ventricular ejection fraction is hyperdynamic (Calculated EF > 70%). .  · Myocardial perfusion imaging indicates a large-sized, severe area of ischemia located in the anterior wall, apex and septal wall.  · Impressions are consistent with a high risk study.  · Findings consistent with an abnormal ECG stress test.  Cardiac catheterization:  · 90 to 95% ostial LAD stenosis.  · Normal LVEDP  · No aortic stenosis  Bilateral carotid duplex  · Left internal carotid artery stenosis of 50-69%.  · Proximal right internal carotid artery plaque without significant stenosis.  · Right internal carotid artery stenosis of 0-49%.  Transthoracic echocardiogram  · Left ventricular ejection fraction appears to be 66 - 70%. Left ventricular systolic function is normal.  · Left ventricular diastolic function is consistent with (grade I) impaired relaxation.  · Estimated right ventricular systolic pressure from tricuspid regurgitation is normal (<35 mmHg).    April 2021: MIDCAB STONE to LAD.    ASSESSMENT:   Diagnosis Plan   1. Coronary artery disease involving native coronary artery of native heart with angina pectoris (CMS/HCC)     2. Essential hypertension     3. Mixed hyperlipidemia  Lipid Panel    Comprehensive Metabolic Panel       PLAN:  Coronary artery disease: Steady recovery in the postoperative period.  Will discontinue dual antiplatelet 3 months after surgery.  Continue current dose of rosuvastatin, will gradually titrate to high intensity.  Lipid profile and LFTs pending today.  Continue cardiac  "rehab.    Hyperlipidemia: Goal LDL less than 70.  Gradually titrating statin.    Subjective  Interval assessment: Approximately 6 weeks status post MIDCAB STONE to LAD.  Blood pressures running less than 120/80 mmHg.  Intermittent right stabbing precordial chest discomfort.  Generalized fatigue which is gradual improving.  She recently started cardiac rehab.  Compliant with medical therapy.  Statin was recently held due to mild transaminitis.  She is currently taking rosuvastatin 10 mg every other day.    Initial evaluation: 65-year-old female with a history of familial hyperlipidemia and family history of early coronary disease presents with gradually progressive exertional chest discomfort.  Describes low precordial chest discomfort radiating to her back and shoulders and arms with such activities as walking up an incline.  The symptoms have occurred consistently over the previous 2 years.  She has been self-medicating with nitroglycerin, she will walk until she develops limiting chest discomfort and then stop and take a sublingual nitroglycerin until her pain resolves.  She reports that she can walk about 1/8 of a mile on level ground before sprinting limiting chest discomfort but experiences chest discomfort which is limiting she encounters any sort of incline.  She has previously been intolerant to atorvastatin and simvastatin with regard to myalgias.  Previously prescribed rosuvastatin which she has not started taking yet.     PHYSICAL EXAMINATION:  Vitals:    05/20/21 1347   BP: 110/62   BP Location: Left arm   Patient Position: Sitting   Pulse: 94   SpO2: 97%   Weight: 65.2 kg (143 lb 12.8 oz)   Height: 157.5 cm (62\")     General Appearance:    Alert, cooperative, no distress, appears stated age   Head:    Normocephalic, without obvious abnormality, atraumatic   Eyes:    conjunctiva/corneas clear   Nose:   Nares normal, septum midline, mucosa normal, no drainage   Throat:   Lips, teeth and gums normal   Neck: "   Supple, symmetrical, trachea midline, no carotid    bruit or JVD   Lungs:     Clear to auscultation bilaterally, respirations unlabored   Chest Wall:    No tenderness or deformity    Heart:    Regular rate and rhythm, S1 and S2 normal, no murmur, rub   or gallop, normal carotid impulse bilaterally without bruit.   Abdomen:     Soft, non-tender   Extremities:   Extremities normal, atraumatic, no cyanosis or edema   Pulses:   2+ and symmetric all extremities   Skin:   Skin color, texture, turgor normal, no rashes or lesions       Diagnostic Data:  Procedures  Lab Results   Component Value Date    CHLPL 320 (H) 02/24/2021    TRIG 88 02/24/2021    HDL 83 (H) 02/24/2021     Lab Results   Component Value Date    GLUCOSE 85 03/18/2021    BUN 14 04/21/2021    CREATININE 0.77 04/21/2021     04/21/2021    K 4.7 04/21/2021    CL 98 04/21/2021    CO2 26.4 04/21/2021     No results found for: HGBA1C  Lab Results   Component Value Date    WBC 6.99 04/21/2021    HGB 14.1 04/21/2021    HCT 42.7 04/21/2021     (H) 04/21/2021       Allergies  No Known Allergies    Current Medications    Current Outpatient Medications:   •  aspirin (aspirin) 81 MG EC tablet, Take 1 tablet by mouth Daily., Disp: 30 tablet, Rfl: 1  •  clopidogrel (PLAVIX) 75 MG tablet, , Disp: , Rfl:   •  famotidine (PEPCID) 20 MG tablet, Take 1 tablet by mouth 2 (Two) Times a Day., Disp: 60 tablet, Rfl: 5  •  nitroglycerin (Nitrostat) 0.4 MG SL tablet, Place 1 tablet under the tongue Every 5 (Five) Minutes As Needed for Chest Pain. Take no more than 3 doses in 15 minutes., Disp: 12 tablet, Rfl: 12  •  ondansetron ODT (ZOFRAN-ODT) 4 MG disintegrating tablet, Place 4 mg on the tongue Every 8 (Eight) Hours As Needed., Disp: , Rfl:           ROS  ROS      SOCIAL HX  Social History     Socioeconomic History   • Marital status:      Spouse name: Not on file   • Number of children: Not on file   • Years of education: Not on file   • Highest education  level: Not on file   Tobacco Use   • Smoking status: Former Smoker     Packs/day: 1.00     Years: 10.00     Pack years: 10.00     Types: Cigarettes     Quit date: 2016     Years since quittin.8   • Smokeless tobacco: Never Used   Vaping Use   • Vaping Use: Never used   Substance and Sexual Activity   • Alcohol use: Yes     Comment: social   • Drug use: No   • Sexual activity: Defer     Partners: Male     Birth control/protection: Post-menopausal       FAMILY HX  Family History   Problem Relation Age of Onset   • Lung cancer Mother    • Lung cancer Father    • Prostate cancer Brother         x 3 brothers   • Heart attack Brother         x 2 brothers    • Colon cancer Neg Hx    • Cirrhosis Neg Hx    • Liver disease Neg Hx    • Liver cancer Neg Hx    • Crohn's disease Neg Hx              Ernesto Cornoa III, MD, FACC

## 2021-05-21 RX ORDER — ROSUVASTATIN CALCIUM 10 MG/1
10 TABLET, COATED ORAL DAILY
Qty: 30 TABLET | Refills: 5 | Status: SHIPPED | OUTPATIENT
Start: 2021-05-21 | End: 2021-08-30

## 2021-05-21 NOTE — TELEPHONE ENCOUNTER
----- Message from Ernesto Corona III, MD sent at 5/21/2021  9:18 AM EDT -----  Liver function tests are normal, start taking rosuvastatin 10 mg every day.

## 2021-05-24 ENCOUNTER — OFFICE VISIT (OUTPATIENT)
Dept: INTERNAL MEDICINE | Facility: CLINIC | Age: 66
End: 2021-05-24

## 2021-05-24 ENCOUNTER — TREATMENT (OUTPATIENT)
Dept: CARDIAC REHAB | Facility: HOSPITAL | Age: 66
End: 2021-05-24

## 2021-05-24 VITALS
WEIGHT: 145 LBS | TEMPERATURE: 96.6 F | DIASTOLIC BLOOD PRESSURE: 80 MMHG | BODY MASS INDEX: 26.68 KG/M2 | SYSTOLIC BLOOD PRESSURE: 126 MMHG | HEART RATE: 70 BPM | HEIGHT: 62 IN | OXYGEN SATURATION: 98 %

## 2021-05-24 DIAGNOSIS — F41.9 ANXIETY: ICD-10-CM

## 2021-05-24 DIAGNOSIS — K63.5 HYPERPLASTIC POLYP OF SIGMOID COLON: ICD-10-CM

## 2021-05-24 DIAGNOSIS — Z00.00 WELLNESS EXAMINATION: ICD-10-CM

## 2021-05-24 DIAGNOSIS — M19.90 ARTHRITIS: ICD-10-CM

## 2021-05-24 DIAGNOSIS — I25.119 CORONARY ARTERY DISEASE INVOLVING NATIVE CORONARY ARTERY OF NATIVE HEART WITH ANGINA PECTORIS (HCC): ICD-10-CM

## 2021-05-24 DIAGNOSIS — K21.9 GASTROESOPHAGEAL REFLUX DISEASE WITHOUT ESOPHAGITIS: ICD-10-CM

## 2021-05-24 DIAGNOSIS — R94.5 ABNORMAL RESULTS OF LIVER FUNCTION STUDIES: ICD-10-CM

## 2021-05-24 DIAGNOSIS — D12.6 ADENOMATOUS POLYP OF COLON, UNSPECIFIED PART OF COLON: ICD-10-CM

## 2021-05-24 DIAGNOSIS — E78.2 MIXED HYPERLIPIDEMIA: Primary | ICD-10-CM

## 2021-05-24 DIAGNOSIS — D75.839 THROMBOCYTOSIS: ICD-10-CM

## 2021-05-24 DIAGNOSIS — R32 URINARY INCONTINENCE, UNSPECIFIED TYPE: ICD-10-CM

## 2021-05-24 DIAGNOSIS — F32.A DEPRESSION, UNSPECIFIED DEPRESSION TYPE: ICD-10-CM

## 2021-05-24 DIAGNOSIS — Z95.1 S/P CABG X 1: Primary | ICD-10-CM

## 2021-05-24 DIAGNOSIS — I10 ESSENTIAL HYPERTENSION: ICD-10-CM

## 2021-05-24 DIAGNOSIS — Z78.0 MENOPAUSE: ICD-10-CM

## 2021-05-24 DIAGNOSIS — K22.2 ESOPHAGEAL STRICTURE: ICD-10-CM

## 2021-05-24 DIAGNOSIS — E55.9 VITAMIN D DEFICIENCY, UNSPECIFIED: ICD-10-CM

## 2021-05-24 PROBLEM — K22.10 EROSIVE ESOPHAGITIS: Status: RESOLVED | Noted: 2021-03-09 | Resolved: 2021-05-24

## 2021-05-24 PROBLEM — D64.9 ANEMIA: Status: RESOLVED | Noted: 2021-02-25 | Resolved: 2021-05-24

## 2021-05-24 PROBLEM — I20.89 ANGINA OF EFFORT: Status: RESOLVED | Noted: 2021-03-10 | Resolved: 2021-05-24

## 2021-05-24 PROBLEM — R10.13 EPIGASTRIC PAIN: Chronic | Status: RESOLVED | Noted: 2018-07-16 | Resolved: 2021-05-24

## 2021-05-24 PROBLEM — I20.8 ANGINA OF EFFORT: Status: RESOLVED | Noted: 2021-03-10 | Resolved: 2021-05-24

## 2021-05-24 PROBLEM — D36.9 TUBULOVILLOUS ADENOMA: Status: RESOLVED | Noted: 2021-03-09 | Resolved: 2021-05-24

## 2021-05-24 PROCEDURE — 99213 OFFICE O/P EST LOW 20 MIN: CPT | Performed by: INTERNAL MEDICINE

## 2021-05-24 PROCEDURE — 93798 PHYS/QHP OP CAR RHAB W/ECG: CPT

## 2021-05-24 PROCEDURE — G0439 PPPS, SUBSEQ VISIT: HCPCS | Performed by: INTERNAL MEDICINE

## 2021-05-24 RX ORDER — CLOPIDOGREL BISULFATE 75 MG/1
75 TABLET ORAL DAILY
Qty: 30 TABLET | Refills: 1 | Status: SHIPPED | OUTPATIENT
Start: 2021-05-24 | End: 2021-08-30

## 2021-05-24 NOTE — PROGRESS NOTES
Subsequent Medicare Wellness Visit    Chief Complaint   Patient presents with   • Follow-up   • Post-op Open Heart Surgery     needs PLAVIX prescription taken over       Subjective   History of Present Illness:  Krystina Henley is a 65 y.o. female who presents for a Subsequent Medicare Wellness Visit.    Patient here for Medicare wellness and physical also has medical problems to be discussed.  CABG post surgery patient is doing better now.  Effusion resolved.  Patient is taking Plavix now.  Patient needs a refill.  Anemia resolved except up platelets slightly elevated.  Blood pressure stable on medication.  Hyperlipidemia stable on medication.  GERD stable on medication.  Esophageal stricture resolved.  Urine incontinence is stable after sling procedure.  Depression anxiety stable without medication     HEALTH RISK ASSESSMENT    Recent Hospitalizations:  No hospitalization(s) within the last year.    Current Medical Providers:  Patient Care Team:  Tre Lindquist MD as PCP - General (Internal Medicine)    Smoking Status:  Social History     Tobacco Use   Smoking Status Former Smoker   • Packs/day: 1.00   • Years: 10.00   • Pack years: 10.00   • Types: Cigarettes   • Quit date: 2016   • Years since quittin.8   Smokeless Tobacco Never Used       Alcohol Consumption:  Social History     Substance and Sexual Activity   Alcohol Use Yes    Comment: social       Depression Screen:   PHQ-2/PHQ-9 Depression Screening 3/22/2021   Little interest or pleasure in doing things 0   Feeling down, depressed, or hopeless 1   Total Score 1       Fall Risk Screen:  ANTELMO Fall Risk Assessment was completed, and patient is at LOW risk for falls.Assessment completed on:3/22/2021    Health Habits and Functional and Cognitive Screening:  No flowsheet data found.      Does the patient have evidence of cognitive impairment? No    Asprin use counseling:Taking ASA appropriately as indicated    Age-appropriate Screening  Schedule:  Refer to the list below for future screening recommendations based on patient's age, sex and/or medical conditions. Orders for these recommended tests are listed in the plan section. The patient has been provided with a written plan.    Health Maintenance   Topic Date Due   • DXA SCAN  Never done   • TDAP/TD VACCINES (1 - Tdap) Never done   • ZOSTER VACCINE (1 of 2) Never done   • PAP SMEAR  Never done   • MAMMOGRAM  07/16/2020   • INFLUENZA VACCINE  08/01/2021   • LIPID PANEL  05/20/2022          The following portions of the patient's history were reviewed and updated as appropriate: allergies, current medications, past family history, past medical history, past social history, past surgical history and problem list.    Outpatient Medications Prior to Visit   Medication Sig Dispense Refill   • aspirin (aspirin) 81 MG EC tablet Take 1 tablet by mouth Daily. 30 tablet 1   • famotidine (PEPCID) 20 MG tablet Take 1 tablet by mouth 2 (Two) Times a Day. 60 tablet 5   • nitroglycerin (Nitrostat) 0.4 MG SL tablet Place 1 tablet under the tongue Every 5 (Five) Minutes As Needed for Chest Pain. Take no more than 3 doses in 15 minutes. 12 tablet 12   • ondansetron ODT (ZOFRAN-ODT) 4 MG disintegrating tablet Place 4 mg on the tongue Every 8 (Eight) Hours As Needed.     • rosuvastatin (CRESTOR) 10 MG tablet Take 1 tablet by mouth Daily. 30 tablet 5   • clopidogrel (PLAVIX) 75 MG tablet        No facility-administered medications prior to visit.       Patient Active Problem List   Diagnosis   • Wellness examination   • Arthritis   • Anxiety   • Depression   • Mixed hyperlipidemia   • Urinary incontinence   • Vitamin D deficiency, unspecified    • Gastroesophageal reflux disease without esophagitis   • Adenomatous polyp of colon   • Hyperplastic polyp of sigmoid colon   • Esophageal stricture   • Abnormal results of liver function studies   • Essential hypertension   • Coronary artery disease involving native coronary  "artery of native heart with angina pectoris (CMS/HCC)   • Thrombocytosis (CMS/HCC)   • Menopause       Advanced Care Planning:  ACP discussion was held with the patient during this visit. Patient has an advance directive (not in EMR), copy requested.    Review of Systems   Constitutional: Negative.    HENT: Negative.    Eyes: Negative.    Respiratory: Negative.    Cardiovascular: Negative.    Gastrointestinal: Negative.    Endocrine: Negative.    Genitourinary: Negative.    Musculoskeletal: Negative.    Skin: Negative.    Allergic/Immunologic: Negative.    Neurological: Negative.    Hematological: Negative.    Psychiatric/Behavioral: Negative.        Compared to one year ago, the patient feels her physical health is the same.  Compared to one year ago, the patient feels her mental health is the same.    Reviewed chart for potential of high risk medication in the elderly: yes  Reviewed chart for potential of harmful drug interactions in the elderly:no    Objective         Vitals:    05/24/21 1417   BP: 126/80   Pulse: 70   Temp: 96.6 °F (35.9 °C)   SpO2: 98%   Weight: 65.8 kg (145 lb)   Height: 157.5 cm (62\")       Body mass index is 26.52 kg/m².  Discussed the patient's BMI with her. The BMI is in the acceptable range.    Physical Exam  Constitutional:       Appearance: She is well-developed.   HENT:      Head: Normocephalic and atraumatic.      Right Ear: External ear normal.      Left Ear: External ear normal.      Nose: Nose normal.   Eyes:      Conjunctiva/sclera: Conjunctivae normal.      Pupils: Pupils are equal, round, and reactive to light.   Cardiovascular:      Rate and Rhythm: Normal rate and regular rhythm.      Heart sounds: Normal heart sounds.   Pulmonary:      Effort: Pulmonary effort is normal.      Breath sounds: Normal breath sounds.   Abdominal:      General: Bowel sounds are normal.      Palpations: Abdomen is soft.   Genitourinary:     Vagina: Normal.   Musculoskeletal:         General: Normal " range of motion.      Cervical back: Normal range of motion and neck supple.   Skin:     General: Skin is warm and dry.   Neurological:      Mental Status: She is alert and oriented to person, place, and time.      Deep Tendon Reflexes: Reflexes are normal and symmetric.   Psychiatric:         Behavior: Behavior normal.         Thought Content: Thought content normal.         Judgment: Judgment normal.         Lab Results   Component Value Date    GLU 71 04/21/2021    GLU 97 04/15/2021    CHLPL 320 (H) 02/24/2021    TRIG 80 05/20/2021    HDL 62 (H) 05/20/2021     (H) 05/20/2021     (H) 02/24/2021    VLDL 14 05/20/2021    VLDL 14 02/24/2021        Assessment/Plan   Medicare Risks and Personalized Health Plan  CMS Preventative Services Quick Reference  Advance Directive Discussion    The above risks/problems have been discussed with the patient.  Pertinent information has been shared with the patient in the After Visit Summary.  Follow up plans and orders are seen below in the Assessment/Plan Section.    Diagnoses and all orders for this visit:    1. Mixed hyperlipidemia (Primary) continue medication follow-up with cardiology    2. Essential hypertension continue medication    3. Coronary artery disease involving native coronary artery of native heart with angina pectoris (CMS/[facility]) refill medication.  -     clopidogrel (PLAVIX) 75 MG tablet; Take 1 tablet by mouth Daily.  Dispense: 30 tablet; Refill: 1    4. Vitamin D deficiency, unspecified stable    6. Gastroesophageal reflux disease without esophagitis stable on medication continue    7. Esophageal stricture resolved after dilation    8. Adenomatous polyp of colon, unspecified part of colon    9. Urinary incontinence, urge and stress.  Improved after sling procedure    10. Abnormal results of liver function studies after high dose of cholesterol medication.  Resolved after cutting down the dose by cardiology.    11. Depression, stable without  medication    12. Anxiety stable without medication    13. Arthritis    14. Thrombocytosis (CMS/HCC) follow-up next    15. Menopause  -     DEXA Bone Density Axial    16. Wellness examination  -     pneumococcal conj. 13-valent (PREVNAR-13) vaccine 0.5 mL      Follow Up:  No follow-ups on file.     An After Visit Summary and PPPS were given to the patient.                diarrhea now.  resolved after d/c Mg    Pleural effusion resolved    Platelet high, watch    Carotid stenosis, patient states that cardiology repeat showed only 30% blockages.  Ultrasound showed 70%.  Continue follow-up with cardiology         shingrix to pharmacy, covid vaccine to pharmacy, tdap to HD,   prevnar today    Mamm patient wants to wait due to CABG  6 months follow-up

## 2021-05-24 NOTE — PROGRESS NOTES
Subjective   Krystina Henley is a 65 y.o. female.     Chief Complaint   Patient presents with   • Follow-up   • Post-op Open Heart Surgery     needs PLAVIX prescription taken over       History of Present Illness       Current Outpatient Medications:   •  aspirin (aspirin) 81 MG EC tablet, Take 1 tablet by mouth Daily., Disp: 30 tablet, Rfl: 1  •  clopidogrel (PLAVIX) 75 MG tablet, , Disp: , Rfl:   •  famotidine (PEPCID) 20 MG tablet, Take 1 tablet by mouth 2 (Two) Times a Day., Disp: 60 tablet, Rfl: 5  •  nitroglycerin (Nitrostat) 0.4 MG SL tablet, Place 1 tablet under the tongue Every 5 (Five) Minutes As Needed for Chest Pain. Take no more than 3 doses in 15 minutes., Disp: 12 tablet, Rfl: 12  •  ondansetron ODT (ZOFRAN-ODT) 4 MG disintegrating tablet, Place 4 mg on the tongue Every 8 (Eight) Hours As Needed., Disp: , Rfl:   •  rosuvastatin (CRESTOR) 10 MG tablet, Take 1 tablet by mouth Daily., Disp: 30 tablet, Rfl: 5    {Common H&P Review Areas:52513}    Review of Systems    Objective   Physical Exam    All tests have been reviewed.    Assessment/Plan   There are no diagnoses linked to this encounter.          1. Hx of CABG (Primary) continue medication.  Patient is on magnesium denies any constipation.  Patient has diarrhea now.  We will cut down to once a day medical magnesium.  Possible because of low magnesium in the hospital.  Check lab.  -     XR Chest PA & Lateral  -     CBC & Differential  -     Comprehensive Metabolic Panel  -     Magnesium  -     TSH  Pleural effusion while in the hospital repeat chest x-ray.  2. Coronary artery disease involving native coronary artery of native heart without angina pectoris follow-up with cardiology  -     TSH     3. Abnormal findings on diagnostic imaging of heart and coronary check lab circulation   -     CBC & Differential     4. Abnormal findings on diagnostic imaging of other specified body structures   -     TSH           1 mo follow up after blood test with  others

## 2021-05-24 NOTE — PROGRESS NOTES
Pt was seen today in CR for a Phase 2 visit.  Vital signs and session notes recorded in [employer] and will be scanned into Epic by HIM.

## 2021-05-26 ENCOUNTER — TREATMENT (OUTPATIENT)
Dept: CARDIAC REHAB | Facility: HOSPITAL | Age: 66
End: 2021-05-26

## 2021-05-26 DIAGNOSIS — Z95.1 S/P CABG X 1: Primary | ICD-10-CM

## 2021-05-26 PROCEDURE — 93798 PHYS/QHP OP CAR RHAB W/ECG: CPT

## 2021-05-26 NOTE — PROGRESS NOTES
Pt was seen today in CR for a Phase 2 visit.  Vital signs and session notes recorded in [employer] and will be scanned into Epic by HIM.

## 2021-05-28 ENCOUNTER — TREATMENT (OUTPATIENT)
Dept: CARDIAC REHAB | Facility: HOSPITAL | Age: 66
End: 2021-05-28

## 2021-05-28 DIAGNOSIS — Z95.1 S/P CABG X 1: Primary | ICD-10-CM

## 2021-05-28 PROCEDURE — 93798 PHYS/QHP OP CAR RHAB W/ECG: CPT

## 2021-05-28 NOTE — PROGRESS NOTES
Pt was seen today in CR for a Phase 2 visit.  Vital signs and session notes recorded in [employer] and will be scanned into Epic by HIM.

## 2021-06-03 ENCOUNTER — TREATMENT (OUTPATIENT)
Dept: CARDIAC REHAB | Facility: HOSPITAL | Age: 66
End: 2021-06-03

## 2021-06-03 DIAGNOSIS — Z95.1 S/P CABG X 1: Primary | ICD-10-CM

## 2021-06-03 PROCEDURE — 93798 PHYS/QHP OP CAR RHAB W/ECG: CPT

## 2021-06-04 ENCOUNTER — TREATMENT (OUTPATIENT)
Dept: CARDIAC REHAB | Facility: HOSPITAL | Age: 66
End: 2021-06-04

## 2021-06-04 DIAGNOSIS — Z95.1 S/P CABG X 1: Primary | ICD-10-CM

## 2021-06-04 PROCEDURE — 93798 PHYS/QHP OP CAR RHAB W/ECG: CPT

## 2021-06-04 NOTE — PROGRESS NOTES
Pt was seen today in CR for a Phase 2 visit.  Vital signs and session notes recorded in [employer] and will be scanned into Epic by HIM.

## 2021-06-07 ENCOUNTER — TREATMENT (OUTPATIENT)
Dept: CARDIAC REHAB | Facility: HOSPITAL | Age: 66
End: 2021-06-07

## 2021-06-07 DIAGNOSIS — Z95.1 S/P CABG X 1: Primary | ICD-10-CM

## 2021-06-07 PROCEDURE — 93798 PHYS/QHP OP CAR RHAB W/ECG: CPT

## 2021-06-07 NOTE — PROGRESS NOTES
Pt was seen today in CR for a Phase 2 visit.  Vital signs and session notes recorded in [employer] and will be scanned into Epic by HIM.

## 2021-06-11 ENCOUNTER — APPOINTMENT (OUTPATIENT)
Dept: BONE DENSITY | Facility: HOSPITAL | Age: 66
End: 2021-06-11

## 2021-06-11 ENCOUNTER — TELEPHONE (OUTPATIENT)
Dept: INTERNAL MEDICINE | Facility: CLINIC | Age: 66
End: 2021-06-11

## 2021-06-11 ENCOUNTER — TREATMENT (OUTPATIENT)
Dept: CARDIAC REHAB | Facility: HOSPITAL | Age: 66
End: 2021-06-11

## 2021-06-11 DIAGNOSIS — Z95.1 S/P CABG X 1: Primary | ICD-10-CM

## 2021-06-11 PROCEDURE — 93798 PHYS/QHP OP CAR RHAB W/ECG: CPT

## 2021-06-11 NOTE — PROGRESS NOTES
Pt was seen today in CR for a Phase 2 visit.  Vital signs and session notes recorded in [employer] and will be scanned into Epic by HIM.

## 2021-06-11 NOTE — TELEPHONE ENCOUNTER
Caller: Krystina Henley    Relationship: Self    Best call back number: [phone]    What does billing need from the patient: I HAVE A BILL FROM [facility] THAT INSURANCE DENIED AS ROUTINE.  THIS WAS LABWORK ON 02/24/21.  IT WAS NOT ROUTINE.  I ENDED UP GOING TO A CARDIOLOGIST WHO WANTED TO GET MY CHOLESTEROL AND LIPIDS CHECKED.

## 2021-06-14 ENCOUNTER — TREATMENT (OUTPATIENT)
Dept: CARDIAC REHAB | Facility: HOSPITAL | Age: 66
End: 2021-06-14

## 2021-06-14 DIAGNOSIS — Z95.1 S/P CABG X 1: Primary | ICD-10-CM

## 2021-06-14 PROCEDURE — 93798 PHYS/QHP OP CAR RHAB W/ECG: CPT

## 2021-06-14 NOTE — PROGRESS NOTES
Pt was seen today in CR for a Phase 2 visit.  Vital signs and session notes recorded in [employer] and will be scanned into Epic by HIM.

## 2021-06-14 NOTE — TELEPHONE ENCOUNTER
Contacted patient and she states that when she spoke with [facility] they informed her that it was coded incorrectly and denied. Can you possibly look into this and help see if the coding could be listed under diagnoses instead of a general?

## 2021-06-16 DIAGNOSIS — K22.10 EROSIVE ESOPHAGITIS: ICD-10-CM

## 2021-06-16 RX ORDER — FAMOTIDINE 20 MG/1
20 TABLET, FILM COATED ORAL 2 TIMES DAILY
Qty: 180 TABLET | Refills: 0 | Status: SHIPPED | OUTPATIENT
Start: 2021-06-16 | End: 2022-01-13

## 2021-06-16 NOTE — TELEPHONE ENCOUNTER
Contacted patient and notified that our systems show the correct coding and diagnosis for labs ordered. I suggested she contact [facility] again to see what is may be needed and I advised her to contact billing department for any further questions.

## 2021-06-18 ENCOUNTER — TREATMENT (OUTPATIENT)
Dept: CARDIAC REHAB | Facility: HOSPITAL | Age: 66
End: 2021-06-18

## 2021-06-18 DIAGNOSIS — Z95.1 S/P CABG X 1: Primary | ICD-10-CM

## 2021-06-18 PROCEDURE — 93798 PHYS/QHP OP CAR RHAB W/ECG: CPT

## 2021-06-18 NOTE — PROGRESS NOTES
Pt was seen today in CR for a Phase 2 visit.  Vital signs and session notes recorded in [employer] and will be scanned into Epic by HIM.

## 2021-06-25 ENCOUNTER — TREATMENT (OUTPATIENT)
Dept: CARDIAC REHAB | Facility: HOSPITAL | Age: 66
End: 2021-06-25

## 2021-06-25 DIAGNOSIS — Z95.1 S/P CABG X 1: Primary | ICD-10-CM

## 2021-06-25 PROCEDURE — 93798 PHYS/QHP OP CAR RHAB W/ECG: CPT

## 2021-06-25 NOTE — PROGRESS NOTES
Pt was seen today in CR for a Phase 2 visit.  Vital signs and session notes recorded in [employer] and will be scanned into Epic by HIM.

## 2021-06-28 ENCOUNTER — TREATMENT (OUTPATIENT)
Dept: CARDIAC REHAB | Facility: HOSPITAL | Age: 66
End: 2021-06-28

## 2021-06-28 DIAGNOSIS — Z95.1 S/P CABG X 1: Primary | ICD-10-CM

## 2021-06-28 PROCEDURE — 93798 PHYS/QHP OP CAR RHAB W/ECG: CPT

## 2021-06-28 NOTE — PROGRESS NOTES
Pt was seen today in CR for a Phase 2 visit.  Vital signs and session notes recorded in [employer] and will be scanned into Epic by HIM.

## 2021-07-02 ENCOUNTER — TREATMENT (OUTPATIENT)
Dept: CARDIAC REHAB | Facility: HOSPITAL | Age: 66
End: 2021-07-02

## 2021-07-02 DIAGNOSIS — Z95.1 S/P CABG X 1: Primary | ICD-10-CM

## 2021-07-02 PROCEDURE — 93798 PHYS/QHP OP CAR RHAB W/ECG: CPT

## 2021-07-02 NOTE — PROGRESS NOTES
Pt was seen today in CR for a Phase 2 visit.  Vital signs and session notes recorded in [employer] and will be scanned into Epic by HIM.

## 2021-08-30 ENCOUNTER — OFFICE VISIT (OUTPATIENT)
Dept: CARDIOLOGY | Facility: CLINIC | Age: 66
End: 2021-08-30

## 2021-08-30 VITALS
SYSTOLIC BLOOD PRESSURE: 110 MMHG | HEART RATE: 63 BPM | HEIGHT: 62 IN | OXYGEN SATURATION: 100 % | DIASTOLIC BLOOD PRESSURE: 64 MMHG | BODY MASS INDEX: 27.2 KG/M2 | WEIGHT: 147.8 LBS

## 2021-08-30 DIAGNOSIS — E78.2 MIXED HYPERLIPIDEMIA: ICD-10-CM

## 2021-08-30 DIAGNOSIS — I10 ESSENTIAL HYPERTENSION: ICD-10-CM

## 2021-08-30 DIAGNOSIS — I25.119 CORONARY ARTERY DISEASE INVOLVING NATIVE CORONARY ARTERY OF NATIVE HEART WITH ANGINA PECTORIS (HCC): Primary | ICD-10-CM

## 2021-08-30 PROCEDURE — 99213 OFFICE O/P EST LOW 20 MIN: CPT | Performed by: INTERNAL MEDICINE

## 2021-08-30 RX ORDER — EZETIMIBE 10 MG/1
10 TABLET ORAL DAILY
Qty: 90 TABLET | Refills: 3 | Status: SHIPPED | OUTPATIENT
Start: 2021-08-30 | End: 2022-03-17

## 2021-08-30 NOTE — PROGRESS NOTES
[facility]  Office visit  Krystina Henley  1955  [phone]  There is no work phone number on file.    VISIT DATE:  8/30/2021    PCP: Tre Lindquist MD  [address] [place] [id number]    CC:  Chief Complaint   Patient presents with   • Coronary Artery Disease       Previous cardiac studies and procedures:  March 2021   Anai scan myocardial perfusion imaging  · Left ventricular ejection fraction is hyperdynamic (Calculated EF > 70%). .  · Myocardial perfusion imaging indicates a large-sized, severe area of ischemia located in the anterior wall, apex and septal wall.  · Impressions are consistent with a high risk study.  · Findings consistent with an abnormal ECG stress test.  Cardiac catheterization:  · 90 to 95% ostial LAD stenosis.  · Normal LVEDP  · No aortic stenosis  Bilateral carotid duplex  · Left internal carotid artery stenosis of 50-69%.  · Proximal right internal carotid artery plaque without significant stenosis.  · Right internal carotid artery stenosis of 0-49%.  Transthoracic echocardiogram  · Left ventricular ejection fraction appears to be 66 - 70%. Left ventricular systolic function is normal.  · Left ventricular diastolic function is consistent with (grade I) impaired relaxation.  · Estimated right ventricular systolic pressure from tricuspid regurgitation is normal (<35 mmHg).    April 2021: MIDCAB STONE to LAD.    ASSESSMENT:   Diagnosis Plan   1. Coronary artery disease involving native coronary artery of native heart with angina pectoris (CMS/HCC)     2. Essential hypertension     3. Mixed hyperlipidemia         PLAN:  Coronary artery disease: Currently stable and asymptomatic.  Continue aspirin 81 mg p.o. daily.  Afterload well controlled.    Hyperlipidemia: Goal LDL less than 70.  Intolerant to simvastatin, rosuvastatin, and lovastatin due to arthralgias.  Trial of Zetia 10 mg p.o. daily.  Will likely require addition of PCSK9 inhibitor in  "the future.    Subjective  Interval assessment: Walking 2 miles a day without difficulty.  Denies chest pain, dyspnea or palpitations.  Developed limiting hand and upper extremity arthralgias on low-dose rosuvastatin.    Initial evaluation: 65-year-old female with a history of familial hyperlipidemia and family history of early coronary disease presents with gradually progressive exertional chest discomfort.  Describes low precordial chest discomfort radiating to her back and shoulders and arms with such activities as walking up an incline.  The symptoms have occurred consistently over the previous 2 years.  She has been self-medicating with nitroglycerin, she will walk until she develops limiting chest discomfort and then stop and take a sublingual nitroglycerin until her pain resolves.  She reports that she can walk about 1/8 of a mile on level ground before sprinting limiting chest discomfort but experiences chest discomfort which is limiting she encounters any sort of incline.  She has previously been intolerant to atorvastatin and simvastatin with regard to myalgias.  Previously prescribed rosuvastatin which she has not started taking yet.     PHYSICAL EXAMINATION:  Vitals:    08/30/21 1422   BP: 110/64   BP Location: Right arm   Patient Position: Sitting   Pulse: 63   SpO2: 100%   Weight: 67 kg (147 lb 12.8 oz)   Height: 156.2 cm (61.5\")     General Appearance:    Alert, cooperative, no distress, appears stated age   Head:    Normocephalic, without obvious abnormality, atraumatic   Eyes:    conjunctiva/corneas clear   Nose:   Nares normal, septum midline, mucosa normal, no drainage   Throat:   Lips, teeth and gums normal   Neck:   Supple, symmetrical, trachea midline, no carotid    bruit or JVD   Lungs:     Clear to auscultation bilaterally, respirations unlabored   Chest Wall:    No tenderness or deformity    Heart:    Regular rate and rhythm, S1 and S2 normal, no murmur, rub   or gallop, normal carotid " impulse bilaterally without bruit.   Abdomen:     Soft, non-tender   Extremities:   Extremities normal, atraumatic, no cyanosis or edema   Pulses:   2+ and symmetric all extremities   Skin:   Skin color, texture, turgor normal, no rashes or lesions       Diagnostic Data:  Procedures  Lab Results   Component Value Date    CHLPL 320 (H) 2021    TRIG 80 2021    HDL 62 (H) 2021     Lab Results   Component Value Date    GLUCOSE 93 2021    BUN 20 2021    CREATININE 0.87 2021     2021    K 4.2 2021     2021    CO2 23.1 2021     No results found for: HGBA1C  Lab Results   Component Value Date    WBC 6.99 2021    HGB 14.1 2021    HCT 42.7 2021     (H) 2021       Allergies  No Known Allergies    Current Medications    Current Outpatient Medications:   •  aspirin (aspirin) 81 MG EC tablet, Take 1 tablet by mouth Daily., Disp: 30 tablet, Rfl: 1  •  famotidine (PEPCID) 20 MG tablet, Take 1 tablet by mouth 2 (Two) Times a Day., Disp: 180 tablet, Rfl: 0  •  nitroglycerin (Nitrostat) 0.4 MG SL tablet, Place 1 tablet under the tongue Every 5 (Five) Minutes As Needed for Chest Pain. Take no more than 3 doses in 15 minutes., Disp: 12 tablet, Rfl: 12  •  ondansetron ODT (ZOFRAN-ODT) 4 MG disintegrating tablet, Place 4 mg on the tongue Every 8 (Eight) Hours As Needed., Disp: , Rfl:   •  ezetimibe (ZETIA) 10 MG tablet, Take 1 tablet by mouth Daily., Disp: 90 tablet, Rfl: 3          ROS  ROS      SOCIAL HX  Social History     Socioeconomic History   • Marital status:      Spouse name: Not on file   • Number of children: Not on file   • Years of education: Not on file   • Highest education level: Not on file   Tobacco Use   • Smoking status: Former Smoker     Packs/day: 1.00     Years: 10.00     Pack years: 10.00     Types: Cigarettes     Quit date: 2016     Years since quittin.1   • Smokeless tobacco: Never Used   •  Tobacco comment: Intermittent   Vaping Use   • Vaping Use: Never used   Substance and Sexual Activity   • Alcohol use: Not Currently     Alcohol/week: 0.0 standard drinks     Comment: social   • Drug use: No   • Sexual activity: Yes     Partners: Male     Birth control/protection: Post-menopausal       FAMILY HX  Family History   Problem Relation Age of Onset   • Lung cancer Mother    • Lung cancer Father    • Prostate cancer Brother         x 3 brothers   • Heart attack Brother         x 2 brothers    • Heart disease Brother    • Colon cancer Neg Hx    • Cirrhosis Neg Hx    • Liver disease Neg Hx    • Liver cancer Neg Hx    • Crohn's disease Neg Hx              Ernesto Corona III, MD, FACC

## 2021-11-22 DIAGNOSIS — Z12.31 ENCOUNTER FOR SCREENING MAMMOGRAM FOR MALIGNANT NEOPLASM OF BREAST: ICD-10-CM

## 2021-11-22 DIAGNOSIS — Z12.39 ENCOUNTER FOR SCREENING FOR MALIGNANT NEOPLASM OF BREAST, UNSPECIFIED SCREENING MODALITY: Primary | ICD-10-CM

## 2022-01-13 ENCOUNTER — OFFICE VISIT (OUTPATIENT)
Dept: GASTROENTEROLOGY | Facility: CLINIC | Age: 67
End: 2022-01-13

## 2022-01-13 VITALS
RESPIRATION RATE: 12 BRPM | SYSTOLIC BLOOD PRESSURE: 116 MMHG | HEART RATE: 67 BPM | DIASTOLIC BLOOD PRESSURE: 72 MMHG | HEIGHT: 62 IN | WEIGHT: 162 LBS | TEMPERATURE: 98.4 F | BODY MASS INDEX: 29.81 KG/M2

## 2022-01-13 DIAGNOSIS — K21.9 GASTROESOPHAGEAL REFLUX DISEASE, UNSPECIFIED WHETHER ESOPHAGITIS PRESENT: ICD-10-CM

## 2022-01-13 DIAGNOSIS — D36.9 TUBULOVILLOUS ADENOMA: ICD-10-CM

## 2022-01-13 DIAGNOSIS — R10.11 RUQ ABDOMINAL PAIN: Primary | ICD-10-CM

## 2022-01-13 DIAGNOSIS — R13.19 ESOPHAGEAL DYSPHAGIA: ICD-10-CM

## 2022-01-13 PROCEDURE — 99214 OFFICE O/P EST MOD 30 MIN: CPT | Performed by: PHYSICIAN ASSISTANT

## 2022-01-13 RX ORDER — SODIUM CHLORIDE 9 MG/ML
30 INJECTION, SOLUTION INTRAVENOUS CONTINUOUS PRN
Status: CANCELLED | OUTPATIENT
Start: 2022-01-13

## 2022-01-13 RX ORDER — BISACODYL 5 MG
TABLET, DELAYED RELEASE (ENTERIC COATED) ORAL
Qty: 4 TABLET | Refills: 0 | Status: SHIPPED | OUTPATIENT
Start: 2022-01-13 | End: 2022-01-21 | Stop reason: HOSPADM

## 2022-01-13 RX ORDER — FAMOTIDINE 40 MG/1
40 TABLET, FILM COATED ORAL NIGHTLY PRN
Qty: 30 TABLET | Refills: 2 | Status: SHIPPED | OUTPATIENT
Start: 2022-01-13 | End: 2023-02-23

## 2022-01-13 RX ORDER — POLYETHYLENE GLYCOL 3350 17 G/17G
POWDER, FOR SOLUTION ORAL
Qty: 238 G | Refills: 0 | Status: SHIPPED | OUTPATIENT
Start: 2022-01-13 | End: 2022-01-21 | Stop reason: HOSPADM

## 2022-01-13 RX ORDER — OMEPRAZOLE 40 MG/1
40 CAPSULE, DELAYED RELEASE ORAL DAILY
Qty: 30 CAPSULE | Refills: 2 | Status: SHIPPED | OUTPATIENT
Start: 2022-01-13 | End: 2022-04-18

## 2022-01-13 NOTE — PROGRESS NOTES
Follow Up Note     Date: 2022   Patient Name: Krystina Henley  MRN: [id number]  : 1955     Primary Care Provider: Tre Lindquist MD     Chief Complaint   Patient presents with   • Liver Follow-up   • Anemia   • Colon Polyps   • Difficulty Swallowing     Hx stricture     History of present illness:   2022  Krystina Henley is a 66 y.o. female who is here today for follow up regarding Liver Follow-up, Anemia, Colon Polyps, and Difficulty Swallowing (Hx stricture).    She is having heartburn daily. Reports reflux symptoms at least 4 nights per week, sometimes vomiting at night. Also has developed a gnawing pain in the RUQ which starts after eating but seems to progress during the meal and occurs for sometime after. Described as sometimes severe and even burning in nature. She has been taking omeprazole 40 mg once daily which has helped some but still with symptoms. She had tried stopping PPIs due to concern for long term side effects for a while and took pepcid instead. She feels that food is sitting in her chest, has history of esophageal dilatation. Does not feel that food is getting stuck while swallowing. Was asked to have repeat colonoscopy 1 year from her last due to history of advanced polyp, colonoscopy due now. BMs are described as daily and normal, has a past history of diverticulitis. No rectal bleeding. Previous anemia and elevated liver enzymes have resolved.     Interval History:  3/9/2021  There is a long standing history of reflux. She has been taking Omeprazole 20 mg twice a day and occasionally takes an Omeprazole 40 mg if she is having breakthrough reflux. Reflux is severe. She admits she does not follow anti-reflux guidelines and eats most foods she should be avoiding. She would like to try to switch to Pepcid due to risk of long term use of PPI therapy. She has not had any further episodes of diarrhea or rectal bleeding.    9/3/2020  Krystina Henley is a 64  "y.o. female who is here today for follow up for after procedure.  Denies any rectal bleeding since the procedure.   Her bowel movements have regularized now and diarrhea resolved.  She is here to discuss the pathology report and biopsy findings     7/6/2020  The patient has a history of reflux off and on for the last several years.  The reflux is rather severe.  Symptoms are described as retrosternal burning sensation, and indigestion.  There is history of occasional regurgitative symptoms.  Frequency being several times per week.  The symptoms are worse at night.  The patient takes acid suppressive therapy with significant breakthrough symptoms.  The patient denies significant consumption of soda beverages, coffee or tea.  She denies eating chocolates.  The patient is a non-smoker.  She however drinks wine perhaps 2 drinks a week.  There is history of some recent weight gain of about 5 pounds over the last 1 month or so.  Her swallowing has significantly improved.     The patient has history of diarrhea off-and-on for the last several months.  Diarrhea is episodic and occurs perhaps couple of times a week.  Severity is mild, frequency of bowel movements being 2-3 times a day.  The stools are described as loose.  Occasionally, there is nocturnal element of diarrhea. The diarrhea is not associated with tenesmus.  The patient has been having recurrent bright red blood per rectum.  She has also noticed passage of \"clots\".  There is no associated dizziness.  There is some soreness at the rectal area.  There is no history of hematemesis or melena.  Her previous colonoscopy was in 2014.     There is no history of liver or pancreatic disease.  There is no family history of colon cancer, inflammatory bowel disease, celiac disease or chronic liver disease.    Subjective     Past Medical History:   Diagnosis Date   • Abnormal ECG Ukn   • Anemia    • Anxiety    • Colon polyp 01/16/2014   • Coronary artery disease April 2021 "   • Depression    • Diverticulosis    • Elevated cholesterol    • Essential hypertension    • Fibroid     uterine   • GERD (gastroesophageal reflux disease)    • Heartburn    • Hiatal hernia    • History of transfusion     no adverse reactions   • Hyperlipidemia    • Myocardial infarction (HCC)    • Osteoarthritis     generalized   • Tinnitus    • Wears glasses     reading only     Past Surgical History:   Procedure Laterality Date   • BLADDER SLING MODIFIED, ANTERIOR AND POSTERIOR VAGINAL REPAIR     • BREAST BIOPSY     • CARDIAC CATHETERIZATION N/A 3/18/2021    Procedure: Left Heart Cath;  Surgeon: Ernesto Corona III, MD;  Location:  [facility];  Service: Cardiovascular;  Laterality: N/A;   • CARDIAC CATHETERIZATION N/A 3/18/2021    Procedure: Percutaneous Coronary Intervention;  Surgeon: Fredrick Juares MD;  Location:  [facility];  Service: Cardiovascular;  Laterality: N/A;   • COLONOSCOPY  01/16/2014   • COLONOSCOPY N/A 7/31/2020    Procedure: COLONOSCOPY WITH BIOPSY AND HOT SNARE POLYPECTOMY;  Surgeon: Juan Coreas MD;  Location: [facility];  Service: Gastroenterology;  Laterality: N/A;   • COLONOSCOPY N/A 1/25/2021    Procedure: COLONOSCOPY WITH COLD BIOPSY POLYPECTOMY;  Surgeon: Juan Coreas MD;  Location: [facility];  Service: Gastroenterology;  Laterality: N/A;   • CORONARY ARTERY BYPASS GRAFT  April 2021   • ENDOSCOPY N/A 2/19/2020    Procedure: ESOPHAGOGASTRODUODENOSCOPY;  Surgeon: Marito Abdi MD;  Location: [facility];  Service: Gastroenterology;  Laterality: N/A;   • ENDOSCOPY N/A 6/3/2020    Procedure: ESOPHAGOGASTRODUODENOSCOPY;  Surgeon: Marito Abdi MD;  Location: [facility];  Service: Gastroenterology;  Laterality: N/A;   • HYSTERECTOMY      still has one ovary on left side   • UPPER GASTROINTESTINAL ENDOSCOPY  03/13/2014   • VAGINAL DELIVERY      x3   • WISDOM TOOTH EXTRACTION       Family History   Problem  Relation Age of Onset   • Lung cancer Mother    • Lung cancer Father    • Prostate cancer Brother         x 3 brothers   • Heart attack Brother         x 2 brothers    • Heart disease Brother    • Colon cancer Neg Hx      Social History     Socioeconomic History   • Marital status:    Tobacco Use   • Smoking status: Former Smoker     Packs/day: 1.00     Years: 10.00     Pack years: 10.00     Types: Cigarettes     Quit date: 2016     Years since quittin.4   • Smokeless tobacco: Never Used   • Tobacco comment: Intermittent   Vaping Use   • Vaping Use: Never used   Substance and Sexual Activity   • Alcohol use: Yes     Alcohol/week: 0.0 standard drinks     Comment: social   • Drug use: No   • Sexual activity: Defer       Current Outpatient Medications:   •  aspirin (aspirin) 81 MG EC tablet, Take 1 tablet by mouth Daily., Disp: 30 tablet, Rfl: 1  •  ezetimibe (ZETIA) 10 MG tablet, Take 1 tablet by mouth Daily. (Patient taking differently: Take 10 mg by mouth 2 (Two) Times a Week.), Disp: 90 tablet, Rfl: 3  •  nitroglycerin (Nitrostat) 0.4 MG SL tablet, Place 1 tablet under the tongue Every 5 (Five) Minutes As Needed for Chest Pain. Take no more than 3 doses in 15 minutes., Disp: 12 tablet, Rfl: 12  •  ondansetron ODT (ZOFRAN-ODT) 4 MG disintegrating tablet, Place 4 mg on the tongue Every 8 (Eight) Hours As Needed., Disp: , Rfl:   •  omeprazole (priLOSEC) 40 MG capsule, Take 1 capsule by mouth Daily. Take 30 minutes before breakfast, Disp: 30 capsule, Rfl: 2    No Known Allergies    The following portions of the patient's history were reviewed and updated as appropriate: allergies, current medications, past family history, past medical history, past social history, past surgical history and problem list.    Objective     Physical Exam  Vitals reviewed.   Constitutional:       General: She is not in acute distress.     Appearance: Normal appearance. She is well-developed. She is not ill-appearing or  "diaphoretic.   HENT:      Head: Normocephalic and atraumatic.      Right Ear: External ear normal.      Left Ear: External ear normal.      Nose: Nose normal.      Mouth/Throat:      Comments: Wearing a mask  Eyes:      General: No scleral icterus.        Right eye: No discharge.         Left eye: No discharge.      Conjunctiva/sclera: Conjunctivae normal.   Neck:      Vascular: No JVD.   Cardiovascular:      Rate and Rhythm: Normal rate and regular rhythm.      Heart sounds: Normal heart sounds. No murmur heard.  No friction rub. No gallop.    Pulmonary:      Effort: Pulmonary effort is normal. No respiratory distress.      Breath sounds: Normal breath sounds. No wheezing or rales.   Chest:      Chest wall: No tenderness.   Abdominal:      General: Bowel sounds are normal. There is no distension.      Palpations: Abdomen is soft. There is no mass.      Tenderness: There is abdominal tenderness (bilateral lower quadrants and epigastric, mild). There is no guarding.   Musculoskeletal:         General: No deformity. Normal range of motion.      Cervical back: Normal range of motion.   Skin:     General: Skin is warm and dry.      Findings: No erythema or rash.   Neurological:      Mental Status: She is alert and oriented to person, place, and time.      Coordination: Coordination normal.   Psychiatric:         Mood and Affect: Mood normal.         Behavior: Behavior normal.         Thought Content: Thought content normal.         Judgment: Judgment normal.       Vitals:    01/13/22 1034   BP: 116/72   Pulse: 67   Resp: 12   Temp: 98.4 °F (36.9 °C)   TempSrc: Infrared   Weight: 73.5 kg (162 lb)   Height: 156.2 cm (61.5\")       Results Review:   I reviewed the patient's new clinical results.    Office Visit on 05/20/2021   Component Date Value Ref Range Status   • Total Cholesterol 05/20/2021 218* 0 - 200 mg/dL Final   • Triglycerides 05/20/2021 80  0 - 150 mg/dL Final   • HDL Cholesterol 05/20/2021 62* 40 - 60 mg/dL " Final   • LDL Cholesterol  05/20/2021 142* 0 - 100 mg/dL Final   • VLDL Cholesterol 05/20/2021 14  5 - 40 mg/dL Final   • LDL/HDL Ratio 05/20/2021 2.26   Final   • Glucose 05/20/2021 93  65 - 99 mg/dL Final   • BUN 05/20/2021 20  8 - 23 mg/dL Final   • Creatinine 05/20/2021 0.87  0.57 - 1.00 mg/dL Final   • Sodium 05/20/2021 140  136 - 145 mmol/L Final   • Potassium 05/20/2021 4.2  3.5 - 5.2 mmol/L Final   • Chloride 05/20/2021 102  98 - 107 mmol/L Final   • CO2 05/20/2021 23.1  22.0 - 29.0 mmol/L Final   • Calcium 05/20/2021 9.5  8.6 - 10.5 mg/dL Final   • Total Protein 05/20/2021 7.5  6.0 - 8.5 g/dL Final   • Albumin 05/20/2021 4.10  3.50 - 5.20 g/dL Final   • ALT (SGPT) 05/20/2021 13  1 - 33 U/L Final   • AST (SGOT) 05/20/2021 20  1 - 32 U/L Final   • Alkaline Phosphatase 05/20/2021 83  39 - 117 U/L Final   • Total Bilirubin 05/20/2021 0.6  0.0 - 1.2 mg/dL Final   • eGFR Non  Amer 05/20/2021 65  >60 mL/min/1.73 Final   • Globulin 05/20/2021 3.4  gm/dL Final   • A/G Ratio 05/20/2021 1.2  g/dL Final   • BUN/Creatinine Ratio 05/20/2021 23.0  7.0 - 25.0 Final   • Anion Gap 05/20/2021 14.9  5.0 - 15.0 mmol/L Final     Labs 3/2021  - Hepatitis A Antibody, Total; negative  - Hepatitis B Surf Antibody; positive  - Hepatitis B Surface Antigen; negative  - Hepatitis B Core Antibody, Total; negative    No radiology results for the last 90 days.     Dated Yari 3, 2020 the patient underwent an upper endoscopy which revealed: Erosive distal esophagitis.  LA class A.  Distal esophageal angulation and early stricturing.  Status post dilation to 18 mm.  Free gastroesophageal reflux.  Moderate sliding hiatal hernia around 3 to 4 cm.  Erythematous gastritis.  No definite Castellanos's.  Second portion of duodenum, biopsy revealed unremarkable duodenal mucosa with preserved villous architecture.  Antrum, body and fundus, biopsies revealed reactive gastropathy with minimal chronic inactive gastritis.  No Helicobacter organisms  identified.  Negative for intestinal metaplasia, dysplasia and malignancy.  Stomach, body and fundus, polyps, biopsy revealed polypoid fragments of oxyntic mucosa with minimal chronic inactive gastritis and mild foveolar hyperplasia.  No Helicobacter organisms identified.  Negative for intestinal metaplasia, dysplasia and malignancy.     Colonoscopy dated 1/25/2021 prior polypectomy site rectosigmoid appeared normal.  1 3 mm polyp in sigmoid colon removed.  Diverticulosis in sigmoid colon.  Nonbleeding internal hemorrhoids.  Otherwise unremarkable.  Sigmoid colon polyp biopsy with hyperplastic polyp.    Assessment / Plan      1. RUQ abdominal pain  2. Esophageal dysphagia  3. Gastroesophageal reflux disease, unspecified whether esophagitis present  History of severe reflux with current worsening of symptoms with frequent heartburn and reflux symptoms worse at night. She is taking Omeprazole 40 mg once daily now. Previously took pepcid 20 mg BID without relief. She describes sensation that food is sitting in her chest and not going down with meals.   EGD done in June 2020 revealed lower esophagus peptic stricture which was dilated to 18 mm with a savory dilator.    Anti-reflux measures. EGD with possible dilatation will be arranged.   Recommend Pepcid 40 mg nightly.     She will need an esophagogastroduodenoscopy with possible dilatation of the esophagus performed with monitored anesthesia care. The indications, technique, alternatives and potential risk and complications were discussed with the patient including but not limited to bleeding, bowel perforations, missing lesions and anesthetic complications. The patient understands and wishes to proceed with the procedure and has given their verbal consent. Written patient education information was given to the patient. She should follow up in the office after this procedure to discuss the results and further recommendations can be made at that time. The patient will  call if they have further questions before procedure.  - Case Request    4. Tubulovillous adenoma  Colonoscopy in January 2021 with 1 small hyperplastic sigmoid polyp removed. Prior polypectomy site unremarkable. Colonoscopy was recommended in 1 year for surveillance due to history of large advanced polyp, due 1/2022. Will arrange now.   Prior colonoscopy done on 7/31/2020  revealed 8 to 10 mm polyp in the ascending colon and 2 more in the hepatic flexure. 2 of these polyps were tubular adenoma and 1 of them sessile serrated adenoma without any dysplasia. She had a large 25 to 30 mm pedunculated lesion in the rectosigmoid junction which was removed with a hot snare piecemeal.  Pathology consistent with a tubulovillous adenoma with high-grade dysplasia.  Polyp stalk did not reveal any high-grade dysplasia.   There is no known family history of colon cancer or colon polyps.    She will have a colonoscopy performed with monitored anesthesia care. The indications, technique, alternatives and potential risk and complications were discussed with the patient including but not limited to bleeding, bowel perforations, missing lesions and anesthetic complications. The patient understands and wishes to proceed with the procedure and has given their verbal consent. Written patient education information was given to the patient. She should follow up in the office after this procedure to discuss the results and further recommendations can be made at that time. The patient will call if they have further questions before procedure.  - Case Request  - polyethylene glycol (MIRALAX) 17 GM/SCOOP powder; Follow directions given at office  Dispense: 238 g; Refill: 0  - bisacodyl (Dulcolax) 5 MG EC tablet; Follow instructions given at office  Dispense: 4 tablet; Refill: 0            Follow Up:   Return for follow up after procedure to discuss results.      Pippa Hand PA-C  [facility] [place]  1/13/2022  12:59 EST    Please note  that portions of this note may have been completed with a voice recognition program. Efforts were made to edit the dictations, but occasionally words are mistranscribed.

## 2022-01-20 PROBLEM — D36.9 TUBULOVILLOUS ADENOMA: Status: ACTIVE | Noted: 2022-01-20

## 2022-01-20 PROBLEM — R10.11 RUQ ABDOMINAL PAIN: Status: ACTIVE | Noted: 2022-01-20

## 2022-01-20 PROBLEM — R13.19 ESOPHAGEAL DYSPHAGIA: Status: ACTIVE | Noted: 2022-01-20

## 2022-01-20 NOTE — PRE-PROCEDURE INSTRUCTIONS
PAT phone history completed with pt for upcoming procedure on 1/21/22 with Dr. Coreas.     PAT PASS GIVEN/REVIEWED WITH PT.  VERBALIZED UNDERSTANDING OF THE FOLLOWING:  DO NOT EAT, DRINK, SMOKE, USE SMOKELESS TOBACCO OR CHEW GUM AFTER MIDNIGHT THE NIGHT BEFORE SURGERY.  THIS ALSO INCLUDES HARD CANDIES AND MINTS.    DO NOT SHAVE THE AREA TO BE OPERATED ON AT LEAST 48 HOURS PRIOR TO THE PROCEDURE.  DO NOT WEAR MAKE UP OR NAIL POLISH.  DO NOT LEAVE IN ANY PIERCING OR WEAR JEWELRY THE DAY OF SURGERY.      DO NOT USE ADHESIVES IF YOU WEAR DENTURES.    DO NOT WEAR EYE CONTACTS; BRING IN YOUR GLASSES.    ONLY TAKE MEDICATION THE MORNING OF YOUR PROCEDURE IF INSTRUCTED BY YOUR SURGEON WITH ENOUGH WATER TO SWALLOW THE MEDICATION.  IF YOUR SURGEON DID NOT SPECIFY WHICH MEDICATIONS TO TAKE, YOU WILL NEED TO CALL THEIR OFFICE FOR FURTHER INSTRUCTIONS AND DO AS THEY INSTRUCT.    LEAVE ANYTHING YOU CONSIDER VALUABLE AT HOME.    YOU WILL NEED TO ARRANGE FOR SOMEONE TO DRIVE YOU HOME AFTER SURGERY.  IT IS RECOMMENDED THAT YOU DO NOT DRIVE, WORK, DRINK ALCOHOL OR MAKE MAJOR DECISIONS FOR AT LEAST 24 HOURS AFTER YOUR PROCEDURE IS COMPLETE.      THE DAY OF YOUR PROCEDURE, BRING IN THE FOLLOWING IF APPLICABLE:   PICTURE ID AND INSURANCE/MEDICARE OR MEDICAID CARDS/ANY CO-PAY THAT MAY BE DUE   COPY OF ADVANCED DIRECTIVE/LIVING WILL/POWER OR    CPAP/BIPAP/INHALERS   SKIN PREP SHEET   YOUR PREADMISSION TESTING PASS (IF NOT A PHONE HISTORY)

## 2022-01-21 ENCOUNTER — ANESTHESIA (OUTPATIENT)
Dept: GASTROENTEROLOGY | Facility: HOSPITAL | Age: 67
End: 2022-01-21

## 2022-01-21 ENCOUNTER — APPOINTMENT (OUTPATIENT)
Dept: MAMMOGRAPHY | Facility: HOSPITAL | Age: 67
End: 2022-01-21

## 2022-01-21 ENCOUNTER — ANESTHESIA EVENT (OUTPATIENT)
Dept: GASTROENTEROLOGY | Facility: HOSPITAL | Age: 67
End: 2022-01-21

## 2022-01-21 ENCOUNTER — HOSPITAL ENCOUNTER (OUTPATIENT)
Facility: HOSPITAL | Age: 67
Setting detail: HOSPITAL OUTPATIENT SURGERY
Discharge: HOME | End: 2022-01-21
Attending: INTERNAL MEDICINE | Admitting: INTERNAL MEDICINE

## 2022-01-21 VITALS
SYSTOLIC BLOOD PRESSURE: 125 MMHG | BODY MASS INDEX: 29.81 KG/M2 | RESPIRATION RATE: 18 BRPM | DIASTOLIC BLOOD PRESSURE: 73 MMHG | HEART RATE: 56 BPM | OXYGEN SATURATION: 98 % | WEIGHT: 162 LBS | HEIGHT: 62 IN | TEMPERATURE: 98.2 F

## 2022-01-21 DIAGNOSIS — D36.9 TUBULOVILLOUS ADENOMA: ICD-10-CM

## 2022-01-21 PROCEDURE — 25010000002 FENTANYL CITRATE (PF) 100 MCG/2ML SOLUTION: Performed by: NURSE ANESTHETIST, CERTIFIED REGISTERED

## 2022-01-21 PROCEDURE — G0105 COLORECTAL SCRN; HI RISK IND: HCPCS | Performed by: INTERNAL MEDICINE

## 2022-01-21 PROCEDURE — 25010000002 PROPOFOL 1000 MG/100ML EMULSION: Performed by: NURSE ANESTHETIST, CERTIFIED REGISTERED

## 2022-01-21 RX ORDER — LIDOCAINE HYDROCHLORIDE 20 MG/ML
INJECTION, SOLUTION INTRAVENOUS AS NEEDED
Status: DISCONTINUED | OUTPATIENT
Start: 2022-01-21 | End: 2022-01-21 | Stop reason: SURG

## 2022-01-21 RX ORDER — FENTANYL CITRATE 50 UG/ML
INJECTION, SOLUTION INTRAMUSCULAR; INTRAVENOUS AS NEEDED
Status: DISCONTINUED | OUTPATIENT
Start: 2022-01-21 | End: 2022-01-21 | Stop reason: SURG

## 2022-01-21 RX ORDER — PROPOFOL 10 MG/ML
INJECTION, EMULSION INTRAVENOUS AS NEEDED
Status: DISCONTINUED | OUTPATIENT
Start: 2022-01-21 | End: 2022-01-21 | Stop reason: SURG

## 2022-01-21 RX ORDER — SIMETHICONE 20 MG/.3ML
EMULSION ORAL AS NEEDED
Status: DISCONTINUED | OUTPATIENT
Start: 2022-01-21 | End: 2022-01-21 | Stop reason: HOSPADM

## 2022-01-21 RX ORDER — SODIUM CHLORIDE 9 MG/ML
30 INJECTION, SOLUTION INTRAVENOUS CONTINUOUS PRN
Status: DISCONTINUED | OUTPATIENT
Start: 2022-01-21 | End: 2022-01-21 | Stop reason: HOSPADM

## 2022-01-21 RX ADMIN — SODIUM CHLORIDE 30 ML/HR: 9 INJECTION, SOLUTION INTRAVENOUS at 10:00

## 2022-01-21 RX ADMIN — PROPOFOL 100 MG: 10 INJECTION, EMULSION INTRAVENOUS at 10:32

## 2022-01-21 RX ADMIN — FENTANYL CITRATE 100 MCG: 50 INJECTION, SOLUTION INTRAMUSCULAR; INTRAVENOUS at 10:32

## 2022-01-21 RX ADMIN — LIDOCAINE HYDROCHLORIDE 50 MG: 20 INJECTION, SOLUTION INTRAVENOUS at 10:32

## 2022-01-21 NOTE — ANESTHESIA PREPROCEDURE EVALUATION
Anesthesia Evaluation     Patient summary reviewed and Nursing notes reviewed   no history of anesthetic complications:  NPO Solid Status: > 8 hours  NPO Liquid Status: > 8 hours           Airway   Mallampati: II  TM distance: >3 FB  Neck ROM: full  Possible difficult intubation  Dental - normal exam     Pulmonary - normal exam   (+) a smoker Former,     ROS comment: Former 10 pack year smoker - QUIT 2016  Cardiovascular - normal exam  Exercise tolerance: good (4-7 METS)    (+) hypertension well controlled less than 2 medications, past MI , CAD, CABG, angina with exertion, hyperlipidemia,     ROS comment: NO ECG for review    Neuro/Psych  (+) psychiatric history Anxiety and Depression,     GI/Hepatic/Renal/Endo    (+)  hiatal hernia, GERD, GI bleeding lower ,     Musculoskeletal     Abdominal  - normal exam   Substance History   (+) alcohol use,   (-) drug use     OB/GYN negative ob/gyn ROS   (-)  Pregnant    Comment: Post menopause      Other   arthritis,      ROS/Med Hx Other: Epigastric pain  Colon cancer screening  Arthritis  Anxiety  Depression  Hyperlipidemia  Urinary incontinence  Vitamin D deficiency, unspecified   Gastroesophageal reflux disease without esophagitis  Dysphagia  Melena  Nausea  Heartburn  Diarrhea  BRBPR (bright red blood per rectum)  Left lower quadrant abdominal tenderness without rebound tenderness  Adenomatous polyp of colon                        Anesthesia Plan    ASA 2     MAC   (Patient advised that intravenous sedation would be utilized as primary anesthetic technique. Every effort will be made to make sure patient is comfortable. Patient advised that they may experience recall of events of the procedure. Patient verbalized understanding and agreed to plan. )  intravenous induction     Anesthetic plan, all risks, benefits, and alternatives have been provided, discussed and informed consent has been obtained with: patient.    Plan discussed with CRNA.

## 2022-01-21 NOTE — ANESTHESIA POSTPROCEDURE EVALUATION
Patient: Kyrstina Henley    Procedure Summary     Date: 01/21/22 Room / Location: [facility] / [facility]    Anesthesia Start: 1031 Anesthesia Stop: 1050    Procedure: COLONOSCOPY  (N/A Anus) Diagnosis:       Tubulovillous adenoma      (Tubulovillous adenoma [D36.9])    Surgeons: Juan Coreas MD Provider: Greg Marrero CRNA    Anesthesia Type: MAC ASA Status: 2          Anesthesia Type: MAC    Vitals  No vitals data found for the desired time range.          Post Anesthesia Care and Evaluation    Patient location during evaluation: bedside  Patient participation: complete - patient participated  Level of consciousness: awake  Pain score: 0  Pain management: adequate  Airway patency: patent  Anesthetic complications: No anesthetic complications  PONV Status: controlled  Cardiovascular status: acceptable and stable  Respiratory status: acceptable and room air  Hydration status: acceptable

## 2022-01-21 NOTE — H&P
[facility]  HISTORY AND PHYSICAL    Patient Name: Krystina Henley  : 1955  MRN: [id number]    Chief Complaint:   For surveillance colonoscopy    History Of Presenting Illness:    Advanced polyp with dysplasia removed in     Past Medical History:   Diagnosis Date   • Abnormal ECG [facility]   • Anemia    • Anxiety    • Colon polyp 2014   • Coronary artery disease 2021   • Depression    • Diverticulosis    • Ear piercing    • Elevated cholesterol    • Essential hypertension     patient states she does not have hypertension   • Fibroid     uterine   • GERD (gastroesophageal reflux disease)    • Heartburn    • Hiatal hernia    • History of nuclear stress test    • History of transfusion     no adverse reactions   • Hyperlipidemia    • Myocardial infarction (HCC)    • Osteoarthritis     generalized   • PONV (postoperative nausea and vomiting)    • Seasonal allergies    • Tinnitus    • Wears glasses     reading only       Past Surgical History:   Procedure Laterality Date   • BLADDER SLING MODIFIED, ANTERIOR AND POSTERIOR VAGINAL REPAIR     • BREAST BIOPSY     • CARDIAC CATHETERIZATION N/A 3/18/2021    Procedure: Left Heart Cath;  Surgeon: Ernesto Corona III, MD;  Location:  [facility];  Service: Cardiovascular;  Laterality: N/A;   • CARDIAC CATHETERIZATION N/A 3/18/2021    Procedure: Percutaneous Coronary Intervention;  Surgeon: Fredrick Juares MD;  Location:  [facility];  Service: Cardiovascular;  Laterality: N/A;   • COLONOSCOPY  2014   • COLONOSCOPY N/A 2020    Procedure: COLONOSCOPY WITH BIOPSY AND HOT SNARE POLYPECTOMY;  Surgeon: Juan Coreas MD;  Location: [facility] ENDOSCOPY;  Service: Gastroenterology;  Laterality: N/A;   • COLONOSCOPY N/A 2021    Procedure: COLONOSCOPY WITH COLD BIOPSY POLYPECTOMY;  Surgeon: Juan Coreas MD;  Location: [facility] ENDOSCOPY;  Service: Gastroenterology;  Laterality: N/A;    • CORONARY ARTERY BYPASS GRAFT  2021   • ENDOSCOPY N/A 2020    Procedure: ESOPHAGOGASTRODUODENOSCOPY;  Surgeon: Marito Abdi MD;  Location: [facility] ENDOSCOPY;  Service: Gastroenterology;  Laterality: N/A;   • ENDOSCOPY N/A 6/3/2020    Procedure: ESOPHAGOGASTRODUODENOSCOPY;  Surgeon: Marito Abdi MD;  Location: [facility] ENDOSCOPY;  Service: Gastroenterology;  Laterality: N/A;   • HYSTERECTOMY      still has one ovary on left side   • UPPER GASTROINTESTINAL ENDOSCOPY  2014   • VAGINAL DELIVERY      x3   • WISDOM TOOTH EXTRACTION         Social History     Socioeconomic History   • Marital status:    Tobacco Use   • Smoking status: Former Smoker     Packs/day: 1.00     Years: 10.00     Pack years: 10.00     Types: Cigarettes     Quit date: 2016     Years since quittin.5   • Smokeless tobacco: Never Used   • Tobacco comment: Intermittent   Vaping Use   • Vaping Use: Never used   Substance and Sexual Activity   • Alcohol use: Yes     Alcohol/week: 0.0 standard drinks     Comment: social   • Drug use: No   • Sexual activity: Defer       Family History   Problem Relation Age of Onset   • Lung cancer Mother    • Lung cancer Father    • Prostate cancer Brother         x 3 brothers   • Heart attack Brother         x 2 brothers    • Heart disease Brother    • Colon cancer Neg Hx        Prior to Admission Medications:  Medications Prior to Admission   Medication Sig Dispense Refill Last Dose   • bisacodyl (Dulcolax) 5 MG EC tablet Follow instructions given at office 4 tablet 0 2022 at 1500   • ezetimibe (ZETIA) 10 MG tablet Take 1 tablet by mouth Daily. (Patient taking differently: Take 10 mg by mouth 2 (Two) Times a Week.) 90 tablet 3 Past Week at Unknown time   • famotidine (PEPCID) 40 MG tablet Take 1 tablet by mouth At Night As Needed for Heartburn. 30 tablet 2 2022 at 0700   • omeprazole (priLOSEC) 40 MG capsule Take 1 capsule by mouth Daily. Take 30 minutes before breakfast  30 capsule 2 1/20/2022 at 1200   • ondansetron ODT (ZOFRAN-ODT) 4 MG disintegrating tablet Place 4 mg on the tongue Every 8 (Eight) Hours As Needed.   Past Week at Unknown time   • polyethylene glycol (MIRALAX) 17 GM/SCOOP powder Follow directions given at office 238 g 0 1/20/2022 at 1200   • aspirin (aspirin) 81 MG EC tablet Take 1 tablet by mouth Daily. 30 tablet 1 More than a month at Unknown time   • nitroglycerin (Nitrostat) 0.4 MG SL tablet Place 1 tablet under the tongue Every 5 (Five) Minutes As Needed for Chest Pain. Take no more than 3 doses in 15 minutes. 12 tablet 12 Unknown at Unknown time       Allergies:  No Known Allergies     Vitals: Temp:  [98.3 °F (36.8 °C)] 98.3 °F (36.8 °C)  Heart Rate:  [62] 62  Resp:  [18] 18  BP: (123)/(75) 123/75    Review Of Systems:  Constitutional:  Negative for chills, fever, and unexpected weight change.  Respiratory:  Negative for cough, chest tightness, shortness of breath, and wheezing.  Cardiovascular:  Negative for chest pain, palpitations, and leg swelling.  Gastrointestinal:  Negative for abdominal distention, abdominal pain, Nausea, vomiting.  Neurological:  Negative for Weakness, numbness, and headaches.     Physical Exam:    General Appearance:  Alert, cooperative, in no acute distress.   Lungs:   Clear to auscultation, respirations regular, even and                 unlabored.   Heart:  Regular rhythm and normal rate.   Abdomen:   Normal bowel sounds, no masses, no organomegaly. Soft, non-tender, non-distended   Neurologic: Alert and oriented x 3. Moves all four limbs equally       Plan: COLONOSCOPY CPT CODE: 54779 (N/A)     Juan Coreas MD  1/21/2022

## 2022-01-21 NOTE — DISCHARGE INSTRUCTIONS
- Discharge patient to home (ambulatory).   - High fiber diet.   - Repeat colonoscopy in 3 years for surveillance due to advanced polyp with dysplasia in the past  - Return to GI office in 8 weeks.     Please follow all post op instructions and follow up appointment time from your physician's office included in your discharge packet.    REST TODAY    No pushing, pulling, tugging,  heavy lifting, or strenuous activity.  No major decision making, driving, or drinking alcoholic beverages for 24 hours. ( due to the medications you have  received)  Always use good hand hygiene/washing techniques.  NO driving while taking pain medications.    To assist you in voiding:  Drink plenty of fluids  Listen to running water while attempting to void.    If you are unable to urinate and you have an uncomfortable urge to void or it has been   6 hours since you were discharged, return to the Emergency Room

## 2022-01-24 ENCOUNTER — TELEPHONE (OUTPATIENT)
Dept: GASTROENTEROLOGY | Facility: CLINIC | Age: 67
End: 2022-01-24

## 2022-01-24 NOTE — TELEPHONE ENCOUNTER
Called and spoke with patient re: procedure cancellation to the COVID increase.  Patient understands that we will call her to R/S.

## 2022-02-10 ENCOUNTER — APPOINTMENT (OUTPATIENT)
Dept: GENERAL RADIOLOGY | Facility: HOSPITAL | Age: 67
End: 2022-02-10

## 2022-02-10 ENCOUNTER — HOSPITAL ENCOUNTER (EMERGENCY)
Facility: HOSPITAL | Age: 67
Discharge: HOME | End: 2022-02-10
Attending: EMERGENCY MEDICINE | Admitting: EMERGENCY MEDICINE

## 2022-02-10 VITALS
DIASTOLIC BLOOD PRESSURE: 70 MMHG | WEIGHT: 162 LBS | SYSTOLIC BLOOD PRESSURE: 118 MMHG | TEMPERATURE: 98.3 F | RESPIRATION RATE: 18 BRPM | HEIGHT: 62 IN | BODY MASS INDEX: 29.81 KG/M2 | OXYGEN SATURATION: 99 % | HEART RATE: 99 BPM

## 2022-02-10 DIAGNOSIS — S68.119A TRAUMATIC AMPUTATION OF TIP OF FINGER, INITIAL ENCOUNTER: Primary | ICD-10-CM

## 2022-02-10 DIAGNOSIS — T07.XXXA MULTIPLE LACERATIONS: ICD-10-CM
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
## 2022-02-10 PROCEDURE — 0 LIDOCAINE 1 % SOLUTION: Performed by: EMERGENCY MEDICINE

## 2022-02-10 PROCEDURE — 90375 RABIES IG IM/SC: CPT | Performed by: EMERGENCY MEDICINE

## 2022-02-10 PROCEDURE — 96375 TX/PRO/DX INJ NEW DRUG ADDON: CPT

## 2022-02-10 PROCEDURE — 25010000002 RABIES VACCINE PER 1 ML: Performed by: EMERGENCY MEDICINE

## 2022-02-10 PROCEDURE — 90471 IMMUNIZATION ADMIN: CPT

## 2022-02-10 PROCEDURE — 96365 THER/PROPH/DIAG IV INF INIT: CPT

## 2022-02-10 PROCEDURE — 25010000002 RABIES IMMUNE GLOBULIN PER 150 INT'L UNITS: Performed by: EMERGENCY MEDICINE

## 2022-02-10 PROCEDURE — 99283 EMERGENCY DEPT VISIT LOW MDM: CPT

## 2022-02-10 PROCEDURE — 0 CEFAZOLIN SODIUM-DEXTROSE 2-3 GM-%(50ML) RECONSTITUTED SOLUTION: Performed by: EMERGENCY MEDICINE

## 2022-02-10 PROCEDURE — 90675 RABIES VACCINE IM: CPT | Performed by: EMERGENCY MEDICINE

## 2022-02-10 PROCEDURE — 96376 TX/PRO/DX INJ SAME DRUG ADON: CPT

## 2022-02-10 PROCEDURE — 73130 X-RAY EXAM OF HAND: CPT

## 2022-02-10 PROCEDURE — 25010000002 MORPHINE PER 10 MG: Performed by: EMERGENCY MEDICINE

## 2022-02-10 PROCEDURE — 25010000002 LORAZEPAM PER 2 MG: Performed by: EMERGENCY MEDICINE

## 2022-02-10 PROCEDURE — 25010000002 ONDANSETRON PER 1 MG: Performed by: EMERGENCY MEDICINE

## 2022-02-10 PROCEDURE — 25010000002 MORPHINE SULFATE (PF) 2 MG/ML SOLUTION: Performed by: EMERGENCY MEDICINE

## 2022-02-10 PROCEDURE — 96372 THER/PROPH/DIAG INJ SC/IM: CPT

## 2022-02-10 RX ORDER — LORAZEPAM 2 MG/ML
1 INJECTION INTRAMUSCULAR ONCE
Status: COMPLETED | OUTPATIENT
Start: 2022-02-10 | End: 2022-02-10

## 2022-02-10 RX ORDER — HYDROCODONE BITARTRATE AND ACETAMINOPHEN 7.5; 325 MG/1; MG/1
1 TABLET ORAL EVERY 6 HOURS PRN
Qty: 10 TABLET | Refills: 0 | Status: SHIPPED | OUTPATIENT
Start: 2022-02-10 | End: 2022-02-13 | Stop reason: SDUPTHER

## 2022-02-10 RX ORDER — ONDANSETRON 2 MG/ML
4 INJECTION INTRAMUSCULAR; INTRAVENOUS ONCE
Status: COMPLETED | OUTPATIENT
Start: 2022-02-10 | End: 2022-02-10

## 2022-02-10 RX ORDER — AMOXICILLIN AND CLAVULANATE POTASSIUM 875; 125 MG/1; MG/1
1 TABLET, FILM COATED ORAL 2 TIMES DAILY
Qty: 20 TABLET | Refills: 0 | Status: SHIPPED | OUTPATIENT
Start: 2022-02-10 | End: 2022-03-17

## 2022-02-10 RX ORDER — CEFAZOLIN SODIUM 2 G/50ML
2 SOLUTION INTRAVENOUS ONCE
Status: COMPLETED | OUTPATIENT
Start: 2022-02-10 | End: 2022-02-10

## 2022-02-10 RX ORDER — MORPHINE SULFATE 2 MG/ML
2 INJECTION, SOLUTION INTRAMUSCULAR; INTRAVENOUS ONCE
Status: COMPLETED | OUTPATIENT
Start: 2022-02-10 | End: 2022-02-10

## 2022-02-10 RX ORDER — MORPHINE SULFATE 4 MG/ML
4 INJECTION, SOLUTION INTRAMUSCULAR; INTRAVENOUS ONCE
Status: COMPLETED | OUTPATIENT
Start: 2022-02-10 | End: 2022-02-10

## 2022-02-10 RX ORDER — LIDOCAINE HYDROCHLORIDE 10 MG/ML
10 INJECTION, SOLUTION INFILTRATION; PERINEURAL ONCE
Status: COMPLETED | OUTPATIENT
Start: 2022-02-10 | End: 2022-02-10

## 2022-02-10 RX ORDER — ONDANSETRON 4 MG/1
4 TABLET, ORALLY DISINTEGRATING ORAL 4 TIMES DAILY PRN
Qty: 20 TABLET | Refills: 0 | Status: SHIPPED | OUTPATIENT
Start: 2022-02-10

## 2022-02-10 RX ADMIN — MORPHINE SULFATE 4 MG: 4 INJECTION, SOLUTION INTRAMUSCULAR; INTRAVENOUS at 12:49

## 2022-02-10 RX ADMIN — RABIES IMMUNE GLOBULIN (HUMAN) 1470 UNITS: 300 INJECTION, SOLUTION INFILTRATION; INTRAMUSCULAR at 15:28

## 2022-02-10 RX ADMIN — LIDOCAINE HYDROCHLORIDE 10 ML: 10 INJECTION, SOLUTION INFILTRATION; PERINEURAL at 14:22

## 2022-02-10 RX ADMIN — CEFAZOLIN SODIUM 2 G: 2 SOLUTION INTRAVENOUS at 13:04

## 2022-02-10 RX ADMIN — MORPHINE SULFATE 4 MG: 4 INJECTION, SOLUTION INTRAMUSCULAR; INTRAVENOUS at 13:13

## 2022-02-10 RX ADMIN — LORAZEPAM 1 MG: 2 INJECTION INTRAMUSCULAR; INTRAVENOUS at 12:58

## 2022-02-10 RX ADMIN — RABIES VACCINE 2.5 UNITS: KIT at 15:29

## 2022-02-10 RX ADMIN — ONDANSETRON 4 MG: 2 INJECTION INTRAMUSCULAR; INTRAVENOUS at 15:28

## 2022-02-10 RX ADMIN — MORPHINE SULFATE 2 MG: 2 INJECTION, SOLUTION INTRAMUSCULAR; INTRAVENOUS at 14:22

## 2022-02-10 RX ADMIN — ONDANSETRON 4 MG: 2 INJECTION INTRAMUSCULAR; INTRAVENOUS at 12:48

## 2022-02-10 NOTE — ED PROVIDER NOTES
Subjective   66-year-old female presents to the ED with a chief complaint of animal attack.  Patient states that she was attacked by a husky that she just took in 4 days ago.  She states that the dog got her on the ground attempted to bite her multiple times.  She has multiple lacerations to her forearms hands and fingers bilaterally.  She also has small bite wounds to her bilateral knees.  She was wearing a thick coat.  She believes the dog is up-to-date on immunizations.  She denies other complaints at this time.          Review of Systems   Skin: Positive for wound.   All other systems reviewed and are negative.      Past Medical History:   Diagnosis Date   • Abnormal ECG Ukn   • Anemia    • Anxiety    • Colon polyp 01/16/2014   • Coronary artery disease April 2021   • Depression    • Diverticulosis    • Dog bite     arms, face, legs   • Ear piercing    • Elevated cholesterol    • Essential hypertension     patient states she does not have hypertension   • Fibroid     uterine   • GERD (gastroesophageal reflux disease)    • Heartburn    • Hiatal hernia    • History of nuclear stress test 2021   • History of transfusion     no adverse reactions   • Hyperlipidemia    • Myocardial infarction (HCC)    • Osteoarthritis     generalized   • PONV (postoperative nausea and vomiting)    • Seasonal allergies    • Tinnitus    • Wears glasses     reading only       No Known Allergies    Past Surgical History:   Procedure Laterality Date   • BLADDER SLING MODIFIED, ANTERIOR AND POSTERIOR VAGINAL REPAIR     • BREAST BIOPSY     • CARDIAC CATHETERIZATION N/A 3/18/2021    Procedure: Left Heart Cath;  Surgeon: Ernesto Corona III, MD;  Location:  [facility];  Service: Cardiovascular;  Laterality: N/A;   • CARDIAC CATHETERIZATION N/A 3/18/2021    Procedure: Percutaneous Coronary Intervention;  Surgeon: Fredrick Juares MD;  Location:  [facility];  Service: Cardiovascular;  Laterality: N/A;   •  COLONOSCOPY  2014   • COLONOSCOPY N/A 2020    Procedure: COLONOSCOPY WITH BIOPSY AND HOT SNARE POLYPECTOMY;  Surgeon: Juan Coreas MD;  Location: [facility] ENDOSCOPY;  Service: Gastroenterology;  Laterality: N/A;   • COLONOSCOPY N/A 2021    Procedure: COLONOSCOPY WITH COLD BIOPSY POLYPECTOMY;  Surgeon: Juan Coreas MD;  Location: [facility] ENDOSCOPY;  Service: Gastroenterology;  Laterality: N/A;   • COLONOSCOPY N/A 2022    Procedure: COLONOSCOPY ;  Surgeon: Juan Coreas MD;  Location: [facility] ENDOSCOPY;  Service: Gastroenterology;  Laterality: N/A;   • CORONARY ARTERY BYPASS GRAFT  2021   • ENDOSCOPY N/A 2020    Procedure: ESOPHAGOGASTRODUODENOSCOPY;  Surgeon: Marito Abdi MD;  Location: [facility] ENDOSCOPY;  Service: Gastroenterology;  Laterality: N/A;   • ENDOSCOPY N/A 6/3/2020    Procedure: ESOPHAGOGASTRODUODENOSCOPY;  Surgeon: Marito Abdi MD;  Location: [facility] ENDOSCOPY;  Service: Gastroenterology;  Laterality: N/A;   • HYSTERECTOMY      still has one ovary on left side   • UPPER GASTROINTESTINAL ENDOSCOPY  2014   • VAGINAL DELIVERY      x3   • WISDOM TOOTH EXTRACTION         Family History   Problem Relation Age of Onset   • Lung cancer Mother    • Lung cancer Father    • Prostate cancer Brother         x 3 brothers   • Heart attack Brother         x 2 brothers    • Heart disease Brother    • Colon cancer Neg Hx        Social History     Socioeconomic History   • Marital status:    Tobacco Use   • Smoking status: Former Smoker     Packs/day: 1.00     Years: 10.00     Pack years: 10.00     Types: Cigarettes     Quit date: 2016     Years since quittin.6   • Smokeless tobacco: Never Used   • Tobacco comment: Intermittent   Vaping Use   • Vaping Use: Never used   Substance and Sexual Activity   • Alcohol use: Yes     Alcohol/week: 0.0 standard drinks     Comment: social   • Drug use: No   • Sexual activity: Defer           Objective    Physical Exam  Vitals and nursing note reviewed.   Constitutional:       General: She is not in acute distress.     Appearance: She is well-developed. She is not diaphoretic.   HENT:      Head: Normocephalic and atraumatic.      Nose: Nose normal.   Eyes:      Conjunctiva/sclera: Conjunctivae normal.   Cardiovascular:      Rate and Rhythm: Normal rate and regular rhythm.   Pulmonary:      Effort: Pulmonary effort is normal. No respiratory distress.      Breath sounds: Normal breath sounds.   Abdominal:      General: There is no distension.      Palpations: Abdomen is soft.      Tenderness: There is no abdominal tenderness. There is no guarding.   Musculoskeletal:         General: No deformity.   Skin:     Comments: Multiple small lacerations to the patient's forearms bilaterally.    Neurological:      Mental Status: She is alert and oriented to person, place, and time.      Cranial Nerves: No cranial nerve deficit.         Procedures           ED Course                                                 MDM  Laceration Repair  Performed by: Dorian Velasquez PA-C  Authorized by: Braulio Toscano DO     Consent:     Consent obtained:  Verbal    Consent given by:  Patient    Risks discussed:  Infection, pain, retained foreign body, need for additional repair, poor cosmetic result, tendon damage, vascular damage, poor wound healing and nerve damage    Alternatives discussed:  No treatment  Anesthesia (see MAR for exact dosages):     Anesthesia method:  None  Laceration details:     Location: Distal tip left index finger, finger pad  Repair type:     Repair type: Complex  Length: Laceration type was essentially a partial amputation of the distal left index finger, complex laceration repaired.  Total length was approximately 3 cm  Pre-procedure details:     Preparation:  Patient was prepped and draped in usual sterile fashion  Exploration:     Hemostasis obtained with: Pressure    Wound exploration: wound explored  through full range of motion      Wound extent comment: Through subcutaneous tissue    Contaminated: no    Treatment:     Wound cleansed with: Sterile saline, Hibiclens    Amount of cleaning:  Standard    Irrigation solution:  Tap water    Irrigation volume: 500 cc    Irrigation method:  Tap    Visualized foreign bodies/material removed: no    Skin repair:     Repair method: Complex, #10 running interlocking 5-0 nylon    Approximation:  Close  Post-procedure details:     Dressing:  Open (no dressing)    Patient tolerance of procedure:  Tolerated well, no immediate complications      Laceration Repair  Performed by: Braulio Toscano DO  Authorized by: Braulio Toscano DO     Consent:     Consent obtained:  Verbal    Consent given by:  Patient    Risks discussed:  Infection, pain, retained foreign body, need for additional repair, poor cosmetic result, tendon damage, vascular damage, poor wound healing and nerve damage    Alternatives discussed:  No treatment  Anesthesia (see MAR for exact dosages):     Anesthesia method:  None  Laceration details:     Location: Right thumb    Length: Laceration was essentially a pad amputation of the right distal thumb pad.  Total length was approximately 3 cm  Repair type:     Repair type: Complex  Pre-procedure details:     Preparation:  Patient was prepped and draped in usual sterile fashion  Exploration:     Hemostasis obtained with: Pressure    Wound exploration: wound explored through full range of motion      Wound extent comment: Partial amputation distal phalanx/tuft fracture right thumb    Contaminated: no    Treatment:     Wound cleansed with: Hibiclens, sterile saline    Amount of cleaning:  Standard    Irrigation solution:  Tap water    Irrigation volume: Soaked and irrigated    Irrigation method:  Tap    Visualized foreign bodies/material removed: no    Skin repair:     Repair method: Complex, #15 simple interrupted 4-0 nylon, tissue flap folded over and tacked down  along distal phalanx.  Approximation:     Approximation:  Close  Post-procedure details:     Dressing:  Open (no dressing)    Patient tolerance of procedure:  Tolerated well, no immediate complications      Laceration Repair  Performed by: Braulio Toscano DO  Authorized by: Braulio Toscano DO     Consent:     Consent obtained:  Verbal    Consent given by:  Patient    Risks discussed:  Infection, pain, retained foreign body, need for additional repair, poor cosmetic result, tendon damage, vascular damage, poor wound healing and nerve damage    Alternatives discussed:  No treatment  Anesthesia (see MAR for exact dosages):     Anesthesia method:  None  Laceration details:     Location: Multiple upper extremity small lacerations requiring 1 or 2 sutures, locations include bilateral forearms bilateral hands and bilateral fingers, total length of all of all of these lacerations and puncture wounds was approximately 15 cm  Repair type:     Repair type: Simple  Pre-procedure details:     Preparation:  Patient was prepped and draped in usual sterile fashion  Exploration:     Hemostasis obtained with: Pressure    Wound exploration: wound explored through full range of motion      Wound extent comment: Through subcutaneous tissue    Contaminated: no    Treatment:     Wound cleansed with: Sterile saline Hibiclens    Amount of cleaning:  Standard    Irrigation solution:  Tap water    Irrigation volume: Various    Irrigation method:  Tap    Visualized foreign bodies/material removed: no    Skin repair:     Repair method: A total of 25 simple interrupted sutures, 4-0 nylon  Approximation:     Approximation:  Close  Post-procedure details:     Dressing:  Open (no dressing)    Patient tolerance of procedure:  Tolerated well, no immediate complications    Patient presented to the ED status post assault by a dog.  She had just adopted this dog and was unsure that it was aggressive.  Unsure of his vaccination status so rabies was  given.  Patient had multiple lacerations and puncture wounds on her upper extremities that were repaired.  The 2 most complex lacerations involve the distal phalanx of the right thumb and the distal phalanx of the left index finger.  Laceration repair notes were entered above.  She was given a dose of antibiotics given the tuft fracture of the right thumb distal phalanx.  Will discharge on appropriate antibiotics prophylactically.  She is appropriate for discharge follow-up with orthopedics.  Shared decision making with the patient.  Offered possible transfer or consultation with [facility] felt that it was very unlikely that they would accept the patient for transfer given the minimal injury.  She was agreeable to closure here in follow-up outpatient.        Final diagnoses:   Traumatic amputation of tip of finger, initial encounter   Dog bite, initial encounter   Multiple lacerations     Addendum added 2/24/2022  Had add an addendum to include length of lacerations for the laceration repairs.          ED Disposition  ED Disposition     ED Disposition Condition Comment    Discharge Stable           Tre Lindquist MD  [address]  [address]  [facility] [address]  [phone]          Babar Garcia MD  [address]  [place] [id number]  [phone]               Medication List      New Prescriptions    amoxicillin-clavulanate 875-125 MG per tablet  Commonly known as: AUGMENTIN  Take 1 tablet by mouth 2 (Two) Times a Day.        Changed    ezetimibe 10 MG tablet  Commonly known as: ZETIA  Take 1 tablet by mouth Daily.  What changed: when to take this     ondansetron ODT 4 MG disintegrating tablet  Commonly known as: ZOFRAN-ODT  Place 1 tablet on the tongue 4 (Four) Times a Day As Needed for Nausea.  What changed:   · when to take this  · reasons to take this        ASK your doctor about these medications    rabies vaccine, PCEC injection  Commonly known as:  RABAVERT  Inject 1 mL into the appropriate muscle as directed by prescriber 1 (One) Time for 1 dose. 1 injection 2-13-22, 1 injection 2/17/2022, 1 injection to 2422  Ask about: Should I take this medication?           Where to Get Your Medications      These medications were sent to [id number] - [place], [place] - [address] - [phone]  - [phone] FX  [address] [place] [address]    Phone: [phone]   · amoxicillin-clavulanate 875-125 MG per tablet  · ondansetron ODT 4 MG disintegrating tablet     You can get these medications from any pharmacy    Bring a paper prescription for each of these medications  · rabies vaccine, PCEC injection          Braulio Toscano, DO  02/10/22 1854       Braulio Toscano, DO  02/24/22 0006

## 2022-02-13 ENCOUNTER — HOSPITAL ENCOUNTER (EMERGENCY)
Facility: HOSPITAL | Age: 67
Discharge: HOME | End: 2022-02-13
Attending: EMERGENCY MEDICINE | Admitting: EMERGENCY MEDICINE

## 2022-02-13 VITALS
HEART RATE: 69 BPM | BODY MASS INDEX: 29.81 KG/M2 | HEIGHT: 62 IN | DIASTOLIC BLOOD PRESSURE: 88 MMHG | SYSTOLIC BLOOD PRESSURE: 133 MMHG | TEMPERATURE: 98.1 F | RESPIRATION RATE: 16 BRPM | OXYGEN SATURATION: 97 % | WEIGHT: 162 LBS

## 2022-02-13 DIAGNOSIS — T07.XXXA MULTIPLE LACERATIONS: ICD-10-CM
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
## 2022-02-13 DIAGNOSIS — Z23 ENCOUNTER FOR REPEAT ADMINISTRATION OF RABIES VACCINATION: ICD-10-CM

## 2022-02-13 DIAGNOSIS — S68.119A TRAUMATIC AMPUTATION OF TIP OF FINGER, INITIAL ENCOUNTER: ICD-10-CM

## 2022-02-13 DIAGNOSIS — Z51.89 VISIT FOR WOUND CHECK: Primary | ICD-10-CM

## 2022-02-13 PROCEDURE — 90675 RABIES VACCINE IM: CPT | Performed by: EMERGENCY MEDICINE

## 2022-02-13 PROCEDURE — 90715 TDAP VACCINE 7 YRS/> IM: CPT | Performed by: EMERGENCY MEDICINE

## 2022-02-13 PROCEDURE — 25010000002 TETANUS-DIPHTH-ACELL PERTUSSIS 5-2.5-18.5 LF-MCG/0.5 SUSPENSION PREFILLED SYRINGE: Performed by: EMERGENCY MEDICINE

## 2022-02-13 PROCEDURE — 99282 EMERGENCY DEPT VISIT SF MDM: CPT

## 2022-02-13 PROCEDURE — 96372 THER/PROPH/DIAG INJ SC/IM: CPT

## 2022-02-13 PROCEDURE — 90471 IMMUNIZATION ADMIN: CPT | Performed by: EMERGENCY MEDICINE

## 2022-02-13 PROCEDURE — 25010000002 RABIES VACCINE PER 1 ML: Performed by: EMERGENCY MEDICINE

## 2022-02-13 RX ORDER — HYDROCODONE BITARTRATE AND ACETAMINOPHEN 7.5; 325 MG/1; MG/1
1 TABLET ORAL EVERY 6 HOURS PRN
Qty: 10 TABLET | Refills: 0 | Status: SHIPPED | OUTPATIENT
Start: 2022-02-13 | End: 2022-03-17

## 2022-02-13 RX ADMIN — TETANUS TOXOID, REDUCED DIPHTHERIA TOXOID AND ACELLULAR PERTUSSIS VACCINE, ADSORBED 0.5 ML: 5; 2.5; 8; 8; 2.5 SUSPENSION INTRAMUSCULAR at 13:41

## 2022-02-13 RX ADMIN — RABIES VACCINE 2.5 UNITS: KIT at 13:41

## 2022-02-13 NOTE — ED PROVIDER NOTES
Subjective   66-year-old female presents to the ED for chief complaint of rabies injection.  Patient was seen and evaluated here 3 days ago she was attacked by a dog.  She had multiple lacerations that were repaired.  She was given her first dose of rabies vaccine.  She needs her second dosing today.  She also believes that she needs a tetanus as she was not up-to-date on tetanus like she thought.  Is also requesting some more pain meds.  Has follow-up with orthopedics tomorrow.  Wounds are well-healing without signs of drainage or infection.  No erythema.  No other complaints at this time.          Review of Systems   Skin: Positive for wound.   All other systems reviewed and are negative.      Past Medical History:   Diagnosis Date   • Abnormal ECG Ukn   • Anemia    • Anxiety    • Colon polyp 01/16/2014   • Coronary artery disease April 2021   • Depression    • Diverticulosis    • Ear piercing    • Elevated cholesterol    • Essential hypertension     patient states she does not have hypertension   • Fibroid     uterine   • GERD (gastroesophageal reflux disease)    • Heartburn    • Hiatal hernia    • History of nuclear stress test 2021   • History of transfusion     no adverse reactions   • Hyperlipidemia    • Myocardial infarction (HCC)    • Osteoarthritis     generalized   • PONV (postoperative nausea and vomiting)    • Seasonal allergies    • Tinnitus    • Wears glasses     reading only       No Known Allergies    Past Surgical History:   Procedure Laterality Date   • BLADDER SLING MODIFIED, ANTERIOR AND POSTERIOR VAGINAL REPAIR     • BREAST BIOPSY     • CARDIAC CATHETERIZATION N/A 3/18/2021    Procedure: Left Heart Cath;  Surgeon: Ernesto Corona III, MD;  Location:  [facility];  Service: Cardiovascular;  Laterality: N/A;   • CARDIAC CATHETERIZATION N/A 3/18/2021    Procedure: Percutaneous Coronary Intervention;  Surgeon: Fredrick Juares MD;  Location:  [facility];   Service: Cardiovascular;  Laterality: N/A;   • COLONOSCOPY  2014   • COLONOSCOPY N/A 2020    Procedure: COLONOSCOPY WITH BIOPSY AND HOT SNARE POLYPECTOMY;  Surgeon: Juan Coreas MD;  Location: [facility] ENDOSCOPY;  Service: Gastroenterology;  Laterality: N/A;   • COLONOSCOPY N/A 2021    Procedure: COLONOSCOPY WITH COLD BIOPSY POLYPECTOMY;  Surgeon: Juan Coreas MD;  Location: [facility] ENDOSCOPY;  Service: Gastroenterology;  Laterality: N/A;   • COLONOSCOPY N/A 2022    Procedure: COLONOSCOPY ;  Surgeon: Juan Coreas MD;  Location: [facility] ENDOSCOPY;  Service: Gastroenterology;  Laterality: N/A;   • CORONARY ARTERY BYPASS GRAFT  2021   • ENDOSCOPY N/A 2020    Procedure: ESOPHAGOGASTRODUODENOSCOPY;  Surgeon: Marito Abdi MD;  Location: [facility] ENDOSCOPY;  Service: Gastroenterology;  Laterality: N/A;   • ENDOSCOPY N/A 6/3/2020    Procedure: ESOPHAGOGASTRODUODENOSCOPY;  Surgeon: Marito Abdi MD;  Location: [facility] ENDOSCOPY;  Service: Gastroenterology;  Laterality: N/A;   • HYSTERECTOMY      still has one ovary on left side   • UPPER GASTROINTESTINAL ENDOSCOPY  2014   • VAGINAL DELIVERY      x3   • WISDOM TOOTH EXTRACTION         Family History   Problem Relation Age of Onset   • Lung cancer Mother    • Lung cancer Father    • Prostate cancer Brother         x 3 brothers   • Heart attack Brother         x 2 brothers    • Heart disease Brother    • Colon cancer Neg Hx        Social History     Socioeconomic History   • Marital status:    Tobacco Use   • Smoking status: Former Smoker     Packs/day: 1.00     Years: 10.00     Pack years: 10.00     Types: Cigarettes     Quit date: 2016     Years since quittin.5   • Smokeless tobacco: Never Used   • Tobacco comment: Intermittent   Vaping Use   • Vaping Use: Never used   Substance and Sexual Activity   • Alcohol use: Yes     Alcohol/week: 0.0 standard drinks     Comment: social   • Drug use: No    • Sexual activity: Defer           Objective   Physical Exam  Vitals and nursing note reviewed.   Constitutional:       General: She is not in acute distress.     Appearance: She is well-developed. She is not diaphoretic.   HENT:      Head: Normocephalic and atraumatic.      Nose: Nose normal.   Eyes:      Conjunctiva/sclera: Conjunctivae normal.   Cardiovascular:      Rate and Rhythm: Normal rate and regular rhythm.      Pulses: Normal pulses.   Pulmonary:      Effort: Pulmonary effort is normal. No respiratory distress.      Breath sounds: Normal breath sounds.   Abdominal:      General: There is no distension.      Palpations: Abdomen is soft.      Tenderness: There is no abdominal tenderness. There is no guarding.   Musculoskeletal:         General: No deformity.   Skin:     Comments: Multiple well-healing wounds to bilateral forearms and hands, no signs of infection or drainage   Neurological:      Mental Status: She is alert and oriented to person, place, and time.      Cranial Nerves: No cranial nerve deficit.         Procedures           ED Course                                                 MDM  Rabies vaccine given, tetanus given, appropriate for discharge we will give another short course of pain meds.      Final diagnoses:   Visit for wound check   Dog bite, initial encounter   Encounter for repeat administration of rabies vaccination       ED Disposition  ED Disposition     None          No follow-up provider specified.       Medication List      No changes were made to your prescriptions during this visit.          Braulio Toscano, DO  02/13/22 [id number]

## 2022-02-16 ENCOUNTER — TRANSCRIBE ORDERS (OUTPATIENT)
Dept: ADMINISTRATIVE | Facility: HOSPITAL | Age: 67
End: 2022-02-16

## 2022-02-16 DIAGNOSIS — M86.9 OSTEOMYELITIS OF FINGER OF LEFT HAND: Primary | ICD-10-CM

## 2022-02-17 ENCOUNTER — HOSPITAL ENCOUNTER (OUTPATIENT)
Dept: INFUSION THERAPY | Facility: HOSPITAL | Age: 67
Setting detail: INFUSION SERIES
Discharge: HOME | End: 2022-02-17

## 2022-02-17 VITALS
HEART RATE: 63 BPM | OXYGEN SATURATION: 96 % | TEMPERATURE: 98.8 F | SYSTOLIC BLOOD PRESSURE: 135 MMHG | RESPIRATION RATE: 18 BRPM | DIASTOLIC BLOOD PRESSURE: 85 MMHG

## 2022-02-17 DIAGNOSIS — Z20.3 NEED FOR POST EXPOSURE PROPHYLAXIS FOR RABIES: ICD-10-CM

## 2022-02-17 DIAGNOSIS — M86.9 OSTEOMYELITIS OF FINGER OF LEFT HAND: Primary | ICD-10-CM

## 2022-02-17 PROCEDURE — 96365 THER/PROPH/DIAG IV INF INIT: CPT

## 2022-02-17 PROCEDURE — 25010000002 ERTAPENEM PER 500 MG: Performed by: INTERNAL MEDICINE

## 2022-02-17 PROCEDURE — 90471 IMMUNIZATION ADMIN: CPT

## 2022-02-17 PROCEDURE — 25010000002 RABIES VACCINE PER 1 ML: Performed by: EMERGENCY MEDICINE

## 2022-02-17 PROCEDURE — 96366 THER/PROPH/DIAG IV INF ADDON: CPT

## 2022-02-17 PROCEDURE — 96372 THER/PROPH/DIAG INJ SC/IM: CPT

## 2022-02-17 PROCEDURE — 25010000002 DAPTOMYCIN PER 1 MG: Performed by: INTERNAL MEDICINE

## 2022-02-17 PROCEDURE — 90675 RABIES VACCINE IM: CPT | Performed by: EMERGENCY MEDICINE

## 2022-02-17 RX ORDER — SODIUM CHLORIDE 9 MG/ML
250 INJECTION, SOLUTION INTRAVENOUS ONCE
Status: DISCONTINUED | OUTPATIENT
Start: 2022-02-17 | End: 2022-02-19 | Stop reason: HOSPADM

## 2022-02-17 RX ORDER — SODIUM CHLORIDE 9 MG/ML
250 INJECTION, SOLUTION INTRAVENOUS ONCE
Status: CANCELLED | OUTPATIENT
Start: 2022-02-18

## 2022-02-17 RX ADMIN — ERTAPENEM SODIUM 1 G: 1 INJECTION, POWDER, LYOPHILIZED, FOR SOLUTION INTRAMUSCULAR; INTRAVENOUS at 10:56

## 2022-02-17 RX ADMIN — RABIES VACCINE 2.5 UNITS: KIT at 11:30

## 2022-02-17 RX ADMIN — DAPTOMYCIN 500 MG: 500 INJECTION, POWDER, LYOPHILIZED, FOR SOLUTION INTRAVENOUS at 10:20

## 2022-02-18 ENCOUNTER — HOSPITAL ENCOUNTER (OUTPATIENT)
Dept: INFUSION THERAPY | Facility: HOSPITAL | Age: 67
Discharge: HOME | End: 2022-02-18
Admitting: INTERNAL MEDICINE

## 2022-02-18 VITALS
RESPIRATION RATE: 16 BRPM | OXYGEN SATURATION: 98 % | DIASTOLIC BLOOD PRESSURE: 91 MMHG | SYSTOLIC BLOOD PRESSURE: 139 MMHG | HEART RATE: 62 BPM | TEMPERATURE: 97.6 F

## 2022-02-18 DIAGNOSIS — M86.9 OSTEOMYELITIS OF FINGER OF LEFT HAND: ICD-10-CM

## 2022-02-18 PROCEDURE — C1751 CATH, INF, PER/CENT/MIDLINE: HCPCS

## 2022-02-18 PROCEDURE — C1894 INTRO/SHEATH, NON-LASER: HCPCS

## 2022-02-18 RX ORDER — SODIUM CHLORIDE 0.9 % (FLUSH) 0.9 %
10 SYRINGE (ML) INJECTION AS NEEDED
Status: DISCONTINUED | OUTPATIENT
Start: 2022-02-18 | End: 2022-02-20 | Stop reason: HOSPADM

## 2022-02-18 NOTE — NURSING NOTE
1250- Here for PICC placement, very pleasant. Noted multiple scabs and bruising on arms and hands and dressings on fingers of both hands. Medications and history reviewed.    1340-  PICC placed rt upper arm. Dressing occlusive, CDI. Pt denies any c/o's. Discharged to [facility].

## 2022-02-19 ENCOUNTER — HOSPITAL ENCOUNTER (OUTPATIENT)
Dept: INFUSION THERAPY | Facility: HOSPITAL | Age: 67
Setting detail: INFUSION SERIES
Discharge: HOME | End: 2022-02-19

## 2022-02-19 VITALS
SYSTOLIC BLOOD PRESSURE: 131 MMHG | OXYGEN SATURATION: 99 % | TEMPERATURE: 97.4 F | HEART RATE: 87 BPM | RESPIRATION RATE: 16 BRPM | DIASTOLIC BLOOD PRESSURE: 97 MMHG

## 2022-02-19 DIAGNOSIS — M86.9 OSTEOMYELITIS OF FINGER OF LEFT HAND: Primary | ICD-10-CM

## 2022-02-19 PROCEDURE — 96365 THER/PROPH/DIAG IV INF INIT: CPT

## 2022-02-19 PROCEDURE — 96368 THER/DIAG CONCURRENT INF: CPT

## 2022-02-19 PROCEDURE — 25010000002 ERTAPENEM PER 500 MG: Performed by: INTERNAL MEDICINE

## 2022-02-19 PROCEDURE — 25010000002 DAPTOMYCIN PER 1 MG: Performed by: INTERNAL MEDICINE

## 2022-02-19 PROCEDURE — 96376 TX/PRO/DX INJ SAME DRUG ADON: CPT

## 2022-02-19 PROCEDURE — 96375 TX/PRO/DX INJ NEW DRUG ADDON: CPT

## 2022-02-19 RX ORDER — SODIUM CHLORIDE 9 MG/ML
250 INJECTION, SOLUTION INTRAVENOUS ONCE
Status: CANCELLED | OUTPATIENT
Start: 2022-02-20

## 2022-02-19 RX ORDER — SODIUM CHLORIDE 9 MG/ML
250 INJECTION, SOLUTION INTRAVENOUS ONCE
Status: DISCONTINUED | OUTPATIENT
Start: 2022-02-19 | End: 2022-02-21 | Stop reason: HOSPADM

## 2022-02-19 RX ADMIN — ERTAPENEM SODIUM 1 G: 1 INJECTION, POWDER, LYOPHILIZED, FOR SOLUTION INTRAMUSCULAR; INTRAVENOUS at 08:53

## 2022-02-19 RX ADMIN — DAPTOMYCIN 500 MG: 500 INJECTION, POWDER, LYOPHILIZED, FOR SOLUTION INTRAVENOUS at 08:40

## 2022-02-20 ENCOUNTER — HOSPITAL ENCOUNTER (OUTPATIENT)
Dept: INFUSION THERAPY | Facility: HOSPITAL | Age: 67
Setting detail: INFUSION SERIES
Discharge: HOME | End: 2022-02-20

## 2022-02-20 VITALS
DIASTOLIC BLOOD PRESSURE: 86 MMHG | SYSTOLIC BLOOD PRESSURE: 125 MMHG | HEART RATE: 72 BPM | RESPIRATION RATE: 16 BRPM | OXYGEN SATURATION: 97 % | TEMPERATURE: 97.9 F

## 2022-02-20 DIAGNOSIS — M86.9 OSTEOMYELITIS OF FINGER OF LEFT HAND: Primary | ICD-10-CM

## 2022-02-20 PROCEDURE — 96375 TX/PRO/DX INJ NEW DRUG ADDON: CPT

## 2022-02-20 PROCEDURE — 96368 THER/DIAG CONCURRENT INF: CPT

## 2022-02-20 PROCEDURE — 25010000002 DAPTOMYCIN PER 1 MG: Performed by: INTERNAL MEDICINE

## 2022-02-20 PROCEDURE — 96365 THER/PROPH/DIAG IV INF INIT: CPT

## 2022-02-20 PROCEDURE — 25010000002 ERTAPENEM PER 500 MG: Performed by: INTERNAL MEDICINE

## 2022-02-20 RX ORDER — SODIUM CHLORIDE 9 MG/ML
250 INJECTION, SOLUTION INTRAVENOUS ONCE
Status: DISCONTINUED | OUTPATIENT
Start: 2022-02-20 | End: 2022-02-22 | Stop reason: HOSPADM

## 2022-02-20 RX ORDER — SODIUM CHLORIDE 9 MG/ML
250 INJECTION, SOLUTION INTRAVENOUS ONCE
Status: CANCELLED | OUTPATIENT
Start: 2022-02-21

## 2022-02-20 RX ADMIN — ERTAPENEM SODIUM 1 G: 1 INJECTION, POWDER, LYOPHILIZED, FOR SOLUTION INTRAMUSCULAR; INTRAVENOUS at 07:48

## 2022-02-20 RX ADMIN — DAPTOMYCIN 500 MG: 500 INJECTION, POWDER, LYOPHILIZED, FOR SOLUTION INTRAVENOUS at 07:49

## 2022-02-21 ENCOUNTER — HOSPITAL ENCOUNTER (OUTPATIENT)
Dept: INFUSION THERAPY | Facility: HOSPITAL | Age: 67
Setting detail: INFUSION SERIES
Discharge: HOME | End: 2022-02-21

## 2022-02-21 VITALS
RESPIRATION RATE: 20 BRPM | DIASTOLIC BLOOD PRESSURE: 78 MMHG | OXYGEN SATURATION: 98 % | HEART RATE: 75 BPM | TEMPERATURE: 97.6 F | SYSTOLIC BLOOD PRESSURE: 131 MMHG

## 2022-02-21 DIAGNOSIS — M86.9 OSTEOMYELITIS OF FINGER OF LEFT HAND: Primary | ICD-10-CM

## 2022-02-21 LAB
ALBUMIN SERPL-MCNC: 3.9 G/DL (ref 3.5–5.2)
ALBUMIN/GLOB SERPL: 1.2 G/DL
ALP SERPL-CCNC: 148 U/L (ref 39–117)
ALT SERPL W P-5'-P-CCNC: 71 U/L (ref 1–33)
ANION GAP SERPL CALCULATED.3IONS-SCNC: 9.5 MMOL/L (ref 5–15)
AST SERPL-CCNC: 51 U/L (ref 1–32)
BASOPHILS # BLD AUTO: 0.05 10*3/MM3 (ref 0–0.2)
BASOPHILS NFR BLD AUTO: 0.8 % (ref 0–1.5)
BILIRUB SERPL-MCNC: 0.2 MG/DL (ref 0–1.2)
BUN SERPL-MCNC: 16 MG/DL (ref 8–23)
BUN/CREAT SERPL: 20.8 (ref 7–25)
CALCIUM SPEC-SCNC: 8.9 MG/DL (ref 8.6–10.5)
CHLORIDE SERPL-SCNC: 105 MMOL/L (ref 98–107)
CK SERPL-CCNC: 63 U/L (ref 20–180)
CO2 SERPL-SCNC: 23.5 MMOL/L (ref 22–29)
CREAT SERPL-MCNC: 0.77 MG/DL (ref 0.57–1)
CRP SERPL-MCNC: 0.82 MG/DL (ref 0–0.5)
DEPRECATED RDW RBC AUTO: 44.9 FL (ref 37–54)
EOSINOPHIL # BLD AUTO: 0.26 10*3/MM3 (ref 0–0.4)
EOSINOPHIL NFR BLD AUTO: 4 % (ref 0.3–6.2)
ERYTHROCYTE [DISTWIDTH] IN BLOOD BY AUTOMATED COUNT: 14.1 % (ref 12.3–15.4)
GFR SERPL CREATININE-BSD FRML MDRD: 75 ML/MIN/1.73
GLOBULIN UR ELPH-MCNC: 3.2 GM/DL
GLUCOSE SERPL-MCNC: 110 MG/DL (ref 65–99)
HCT VFR BLD AUTO: 37.2 % (ref 34–46.6)
HGB BLD-MCNC: 12.3 G/DL (ref 12–15.9)
IMM GRANULOCYTES # BLD AUTO: 0.02 10*3/MM3 (ref 0–0.05)
IMM GRANULOCYTES NFR BLD AUTO: 0.3 % (ref 0–0.5)
LYMPHOCYTES # BLD AUTO: 2.46 10*3/MM3 (ref 0.7–3.1)
LYMPHOCYTES NFR BLD AUTO: 38.3 % (ref 19.6–45.3)
MCH RBC QN AUTO: 29 PG (ref 26.6–33)
MCHC RBC AUTO-ENTMCNC: 33.1 G/DL (ref 31.5–35.7)
MCV RBC AUTO: 87.7 FL (ref 79–97)
MONOCYTES # BLD AUTO: 0.46 10*3/MM3 (ref 0.1–0.9)
MONOCYTES NFR BLD AUTO: 7.2 % (ref 5–12)
NEUTROPHILS NFR BLD AUTO: 3.17 10*3/MM3 (ref 1.7–7)
NEUTROPHILS NFR BLD AUTO: 49.4 % (ref 42.7–76)
NRBC BLD AUTO-RTO: 0 /100 WBC (ref 0–0.2)
PLATELET # BLD AUTO: 370 10*3/MM3 (ref 140–450)
PMV BLD AUTO: 9.8 FL (ref 6–12)
POTASSIUM SERPL-SCNC: 4.4 MMOL/L (ref 3.5–5.2)
PROT SERPL-MCNC: 7.1 G/DL (ref 6–8.5)
RBC # BLD AUTO: 4.24 10*6/MM3 (ref 3.77–5.28)
SODIUM SERPL-SCNC: 138 MMOL/L (ref 136–145)
WBC NRBC COR # BLD: 6.42 10*3/MM3 (ref 3.4–10.8)

## 2022-02-21 PROCEDURE — 96368 THER/DIAG CONCURRENT INF: CPT

## 2022-02-21 PROCEDURE — 82550 ASSAY OF CK (CPK): CPT | Performed by: INTERNAL MEDICINE

## 2022-02-21 PROCEDURE — 96365 THER/PROPH/DIAG IV INF INIT: CPT

## 2022-02-21 PROCEDURE — 85025 COMPLETE CBC W/AUTO DIFF WBC: CPT | Performed by: INTERNAL MEDICINE

## 2022-02-21 PROCEDURE — 86140 C-REACTIVE PROTEIN: CPT | Performed by: INTERNAL MEDICINE

## 2022-02-21 PROCEDURE — 25010000002 DAPTOMYCIN PER 1 MG: Performed by: INTERNAL MEDICINE

## 2022-02-21 PROCEDURE — 25010000002 ERTAPENEM PER 500 MG: Performed by: INTERNAL MEDICINE

## 2022-02-21 PROCEDURE — 80053 COMPREHEN METABOLIC PANEL: CPT | Performed by: INTERNAL MEDICINE

## 2022-02-21 RX ORDER — SODIUM CHLORIDE 9 MG/ML
250 INJECTION, SOLUTION INTRAVENOUS ONCE
Status: CANCELLED | OUTPATIENT
Start: 2022-02-22

## 2022-02-21 RX ORDER — SODIUM CHLORIDE 9 MG/ML
250 INJECTION, SOLUTION INTRAVENOUS ONCE
Status: DISCONTINUED | OUTPATIENT
Start: 2022-02-21 | End: 2022-02-23 | Stop reason: HOSPADM

## 2022-02-21 RX ADMIN — DAPTOMYCIN 500 MG: 500 INJECTION, POWDER, LYOPHILIZED, FOR SOLUTION INTRAVENOUS at 08:52

## 2022-02-21 RX ADMIN — ERTAPENEM SODIUM 1 G: 1 INJECTION, POWDER, LYOPHILIZED, FOR SOLUTION INTRAMUSCULAR; INTRAVENOUS at 08:51

## 2022-02-21 NOTE — ADDENDUM NOTE
Encounter addended by: Claritza Liriano RN on: 2/21/2022 9:32 AM   Actions taken: Clinical Note Signed

## 2022-02-22 ENCOUNTER — HOSPITAL ENCOUNTER (OUTPATIENT)
Dept: INFUSION THERAPY | Facility: HOSPITAL | Age: 67
Setting detail: INFUSION SERIES
Discharge: HOME | End: 2022-02-22

## 2022-02-22 VITALS
TEMPERATURE: 97.2 F | RESPIRATION RATE: 18 BRPM | OXYGEN SATURATION: 98 % | DIASTOLIC BLOOD PRESSURE: 73 MMHG | HEART RATE: 83 BPM | SYSTOLIC BLOOD PRESSURE: 134 MMHG

## 2022-02-22 DIAGNOSIS — M86.9 OSTEOMYELITIS OF FINGER OF LEFT HAND: Primary | ICD-10-CM

## 2022-02-22 PROCEDURE — 25010000002 ERTAPENEM PER 500 MG

## 2022-02-22 PROCEDURE — 25010000002 DAPTOMYCIN PER 1 MG: Performed by: INTERNAL MEDICINE

## 2022-02-22 PROCEDURE — 96368 THER/DIAG CONCURRENT INF: CPT

## 2022-02-22 PROCEDURE — 96365 THER/PROPH/DIAG IV INF INIT: CPT

## 2022-02-22 RX ORDER — SODIUM CHLORIDE 9 MG/ML
250 INJECTION, SOLUTION INTRAVENOUS ONCE
Status: DISCONTINUED | OUTPATIENT
Start: 2022-02-22 | End: 2022-02-24 | Stop reason: HOSPADM

## 2022-02-22 RX ORDER — SODIUM CHLORIDE 9 MG/ML
250 INJECTION, SOLUTION INTRAVENOUS ONCE
Status: CANCELLED | OUTPATIENT
Start: 2022-02-23

## 2022-02-22 RX ADMIN — DAPTOMYCIN 500 MG: 500 INJECTION, POWDER, LYOPHILIZED, FOR SOLUTION INTRAVENOUS at 08:35

## 2022-02-22 RX ADMIN — Medication 1 G: at 08:34

## 2022-02-23 ENCOUNTER — HOSPITAL ENCOUNTER (OUTPATIENT)
Dept: INFUSION THERAPY | Facility: HOSPITAL | Age: 67
Setting detail: INFUSION SERIES
Discharge: HOME | End: 2022-02-23

## 2022-02-23 VITALS
HEART RATE: 71 BPM | TEMPERATURE: 98.2 F | OXYGEN SATURATION: 95 % | RESPIRATION RATE: 18 BRPM | DIASTOLIC BLOOD PRESSURE: 84 MMHG | SYSTOLIC BLOOD PRESSURE: 144 MMHG

## 2022-02-23 DIAGNOSIS — M86.9 OSTEOMYELITIS OF FINGER OF LEFT HAND: Primary | ICD-10-CM

## 2022-02-23 PROCEDURE — 25010000002 ERTAPENEM PER 500 MG

## 2022-02-23 PROCEDURE — 96365 THER/PROPH/DIAG IV INF INIT: CPT

## 2022-02-23 PROCEDURE — 96368 THER/DIAG CONCURRENT INF: CPT

## 2022-02-23 PROCEDURE — 25010000002 DAPTOMYCIN PER 1 MG: Performed by: INTERNAL MEDICINE

## 2022-02-23 RX ORDER — SODIUM CHLORIDE 9 MG/ML
250 INJECTION, SOLUTION INTRAVENOUS ONCE
Status: DISCONTINUED | OUTPATIENT
Start: 2022-02-23 | End: 2022-02-25 | Stop reason: HOSPADM

## 2022-02-23 RX ORDER — SODIUM CHLORIDE 9 MG/ML
250 INJECTION, SOLUTION INTRAVENOUS ONCE
Status: CANCELLED | OUTPATIENT
Start: 2022-02-24

## 2022-02-23 RX ADMIN — Medication 1 G: at 10:58

## 2022-02-23 RX ADMIN — DAPTOMYCIN 500 MG: 500 INJECTION, POWDER, LYOPHILIZED, FOR SOLUTION INTRAVENOUS at 10:58

## 2022-02-24 ENCOUNTER — HOSPITAL ENCOUNTER (OUTPATIENT)
Dept: INFUSION THERAPY | Facility: HOSPITAL | Age: 67
Setting detail: INFUSION SERIES
Discharge: HOME | End: 2022-02-24

## 2022-02-24 VITALS
HEART RATE: 71 BPM | SYSTOLIC BLOOD PRESSURE: 120 MMHG | TEMPERATURE: 97.5 F | OXYGEN SATURATION: 95 % | RESPIRATION RATE: 18 BRPM | DIASTOLIC BLOOD PRESSURE: 82 MMHG

## 2022-02-24 DIAGNOSIS — M86.9 OSTEOMYELITIS OF FINGER OF LEFT HAND: Primary | ICD-10-CM

## 2022-02-24 DIAGNOSIS — Z20.3 NEED FOR POST EXPOSURE PROPHYLAXIS FOR RABIES: ICD-10-CM

## 2022-02-24 PROCEDURE — 25010000002 RABIES VACCINE PER 1 ML: Performed by: EMERGENCY MEDICINE

## 2022-02-24 PROCEDURE — 90675 RABIES VACCINE IM: CPT | Performed by: EMERGENCY MEDICINE

## 2022-02-24 PROCEDURE — 96372 THER/PROPH/DIAG INJ SC/IM: CPT

## 2022-02-24 PROCEDURE — 90471 IMMUNIZATION ADMIN: CPT

## 2022-02-24 PROCEDURE — 96368 THER/DIAG CONCURRENT INF: CPT

## 2022-02-24 PROCEDURE — 25010000002 DAPTOMYCIN PER 1 MG: Performed by: INTERNAL MEDICINE

## 2022-02-24 PROCEDURE — 25010000002 ERTAPENEM PER 500 MG: Performed by: INTERNAL MEDICINE

## 2022-02-24 RX ORDER — SODIUM CHLORIDE 9 MG/ML
250 INJECTION, SOLUTION INTRAVENOUS ONCE
Status: DISCONTINUED | OUTPATIENT
Start: 2022-02-24 | End: 2022-02-26 | Stop reason: HOSPADM

## 2022-02-24 RX ORDER — SODIUM CHLORIDE 9 MG/ML
250 INJECTION, SOLUTION INTRAVENOUS ONCE
Status: CANCELLED | OUTPATIENT
Start: 2022-02-25

## 2022-02-24 RX ADMIN — Medication 1 G: at 11:11

## 2022-02-24 RX ADMIN — RABIES VACCINE 2.5 UNITS: KIT at 11:42

## 2022-02-24 RX ADMIN — DAPTOMYCIN 500 MG: 500 INJECTION, POWDER, LYOPHILIZED, FOR SOLUTION INTRAVENOUS at 11:11

## 2022-02-25 ENCOUNTER — HOSPITAL ENCOUNTER (OUTPATIENT)
Dept: INFUSION THERAPY | Facility: HOSPITAL | Age: 67
Setting detail: INFUSION SERIES
Discharge: HOME | End: 2022-02-25

## 2022-02-25 VITALS
RESPIRATION RATE: 20 BRPM | SYSTOLIC BLOOD PRESSURE: 102 MMHG | DIASTOLIC BLOOD PRESSURE: 64 MMHG | HEART RATE: 63 BPM | OXYGEN SATURATION: 98 % | TEMPERATURE: 96.3 F

## 2022-02-25 DIAGNOSIS — M86.9 OSTEOMYELITIS OF FINGER OF LEFT HAND: Primary | ICD-10-CM

## 2022-02-25 PROCEDURE — 25010000002 DAPTOMYCIN PER 1 MG: Performed by: INTERNAL MEDICINE

## 2022-02-25 PROCEDURE — 96368 THER/DIAG CONCURRENT INF: CPT

## 2022-02-25 PROCEDURE — 25010000002 ERTAPENEM PER 500 MG: Performed by: INTERNAL MEDICINE

## 2022-02-25 RX ORDER — SODIUM CHLORIDE 9 MG/ML
250 INJECTION, SOLUTION INTRAVENOUS ONCE
Status: DISCONTINUED | OUTPATIENT
Start: 2022-02-25 | End: 2022-02-27 | Stop reason: HOSPADM

## 2022-02-25 RX ORDER — SODIUM CHLORIDE 9 MG/ML
250 INJECTION, SOLUTION INTRAVENOUS ONCE
Status: CANCELLED | OUTPATIENT
Start: 2022-02-26

## 2022-02-25 RX ADMIN — Medication 1 G: at 09:57

## 2022-02-25 RX ADMIN — DAPTOMYCIN 500 MG: 500 INJECTION, POWDER, LYOPHILIZED, FOR SOLUTION INTRAVENOUS at 09:56

## 2022-02-26 ENCOUNTER — HOSPITAL ENCOUNTER (OUTPATIENT)
Dept: INFUSION THERAPY | Facility: HOSPITAL | Age: 67
Setting detail: INFUSION SERIES
Discharge: HOME | End: 2022-02-26

## 2022-02-26 VITALS
OXYGEN SATURATION: 97 % | DIASTOLIC BLOOD PRESSURE: 73 MMHG | RESPIRATION RATE: 18 BRPM | TEMPERATURE: 98.4 F | HEART RATE: 76 BPM | SYSTOLIC BLOOD PRESSURE: 117 MMHG

## 2022-02-26 DIAGNOSIS — M86.9 OSTEOMYELITIS OF FINGER OF LEFT HAND: Primary | ICD-10-CM

## 2022-02-26 PROCEDURE — 96368 THER/DIAG CONCURRENT INF: CPT

## 2022-02-26 PROCEDURE — 25010000002 DAPTOMYCIN PER 1 MG: Performed by: INTERNAL MEDICINE

## 2022-02-26 PROCEDURE — 25010000002 ERTAPENEM PER 500 MG: Performed by: INTERNAL MEDICINE

## 2022-02-26 RX ORDER — SODIUM CHLORIDE 9 MG/ML
250 INJECTION, SOLUTION INTRAVENOUS ONCE
Status: CANCELLED | OUTPATIENT
Start: 2022-02-27

## 2022-02-26 RX ORDER — SODIUM CHLORIDE 9 MG/ML
250 INJECTION, SOLUTION INTRAVENOUS ONCE
Status: DISCONTINUED | OUTPATIENT
Start: 2022-02-26 | End: 2022-02-28 | Stop reason: HOSPADM

## 2022-02-26 RX ADMIN — Medication 1 G: at 08:43

## 2022-02-26 RX ADMIN — DAPTOMYCIN 500 MG: 500 INJECTION, POWDER, LYOPHILIZED, FOR SOLUTION INTRAVENOUS at 08:52

## 2022-02-27 ENCOUNTER — HOSPITAL ENCOUNTER (OUTPATIENT)
Dept: INFUSION THERAPY | Facility: HOSPITAL | Age: 67
Setting detail: INFUSION SERIES
Discharge: HOME | End: 2022-02-27

## 2022-02-27 VITALS
DIASTOLIC BLOOD PRESSURE: 75 MMHG | RESPIRATION RATE: 18 BRPM | TEMPERATURE: 98.6 F | SYSTOLIC BLOOD PRESSURE: 112 MMHG | OXYGEN SATURATION: 99 % | HEART RATE: 78 BPM

## 2022-02-27 DIAGNOSIS — M86.9 OSTEOMYELITIS OF FINGER OF LEFT HAND: Primary | ICD-10-CM

## 2022-02-27 PROCEDURE — 25010000002 DAPTOMYCIN PER 1 MG: Performed by: INTERNAL MEDICINE

## 2022-02-27 PROCEDURE — 25010000002 ERTAPENEM PER 500 MG: Performed by: INTERNAL MEDICINE

## 2022-02-27 PROCEDURE — 96368 THER/DIAG CONCURRENT INF: CPT

## 2022-02-27 RX ORDER — SODIUM CHLORIDE 9 MG/ML
250 INJECTION, SOLUTION INTRAVENOUS ONCE
Status: CANCELLED | OUTPATIENT
Start: 2022-02-28

## 2022-02-27 RX ORDER — SODIUM CHLORIDE 9 MG/ML
250 INJECTION, SOLUTION INTRAVENOUS ONCE
Status: DISCONTINUED | OUTPATIENT
Start: 2022-02-27 | End: 2022-03-01 | Stop reason: HOSPADM

## 2022-02-27 RX ADMIN — DAPTOMYCIN 500 MG: 500 INJECTION, POWDER, LYOPHILIZED, FOR SOLUTION INTRAVENOUS at 07:40

## 2022-02-27 RX ADMIN — Medication 1 G: at 07:39

## 2022-02-28 ENCOUNTER — HOSPITAL ENCOUNTER (OUTPATIENT)
Dept: INFUSION THERAPY | Facility: HOSPITAL | Age: 67
Setting detail: INFUSION SERIES
Discharge: HOME | End: 2022-02-28

## 2022-02-28 VITALS
HEART RATE: 78 BPM | BODY MASS INDEX: 29.63 KG/M2 | RESPIRATION RATE: 16 BRPM | DIASTOLIC BLOOD PRESSURE: 71 MMHG | WEIGHT: 162 LBS | SYSTOLIC BLOOD PRESSURE: 113 MMHG | TEMPERATURE: 97.8 F

## 2022-02-28 DIAGNOSIS — M86.9 OSTEOMYELITIS OF FINGER OF LEFT HAND: Primary | ICD-10-CM

## 2022-02-28 LAB
ALBUMIN SERPL-MCNC: 3.6 G/DL (ref 3.5–5.2)
ALBUMIN/GLOB SERPL: 1.1 G/DL
ALP SERPL-CCNC: 97 U/L (ref 39–117)
ALT SERPL W P-5'-P-CCNC: 26 U/L (ref 1–33)
ANION GAP SERPL CALCULATED.3IONS-SCNC: 10.9 MMOL/L (ref 5–15)
AST SERPL-CCNC: 23 U/L (ref 1–32)
BASOPHILS # BLD AUTO: 0.05 10*3/MM3 (ref 0–0.2)
BASOPHILS NFR BLD AUTO: 1 % (ref 0–1.5)
BILIRUB SERPL-MCNC: 0.2 MG/DL (ref 0–1.2)
BUN SERPL-MCNC: 18 MG/DL (ref 8–23)
BUN/CREAT SERPL: 26.9 (ref 7–25)
CALCIUM SPEC-SCNC: 8.9 MG/DL (ref 8.6–10.5)
CHLORIDE SERPL-SCNC: 107 MMOL/L (ref 98–107)
CK SERPL-CCNC: 62 U/L (ref 20–180)
CO2 SERPL-SCNC: 23.1 MMOL/L (ref 22–29)
CREAT SERPL-MCNC: 0.67 MG/DL (ref 0.57–1)
CRP SERPL-MCNC: 0.45 MG/DL (ref 0–0.5)
DEPRECATED RDW RBC AUTO: 43.8 FL (ref 37–54)
EGFRCR SERPLBLD CKD-EPI 2021: 95.9 ML/MIN/1.73
EOSINOPHIL # BLD AUTO: 0.19 10*3/MM3 (ref 0–0.4)
EOSINOPHIL NFR BLD AUTO: 3.8 % (ref 0.3–6.2)
ERYTHROCYTE [DISTWIDTH] IN BLOOD BY AUTOMATED COUNT: 13.8 % (ref 12.3–15.4)
GLOBULIN UR ELPH-MCNC: 3.2 GM/DL
GLUCOSE SERPL-MCNC: 103 MG/DL (ref 65–99)
HCT VFR BLD AUTO: 34.6 % (ref 34–46.6)
HGB BLD-MCNC: 11.5 G/DL (ref 12–15.9)
IMM GRANULOCYTES # BLD AUTO: 0.01 10*3/MM3 (ref 0–0.05)
IMM GRANULOCYTES NFR BLD AUTO: 0.2 % (ref 0–0.5)
LYMPHOCYTES # BLD AUTO: 2.34 10*3/MM3 (ref 0.7–3.1)
LYMPHOCYTES NFR BLD AUTO: 46.4 % (ref 19.6–45.3)
MCH RBC QN AUTO: 28.5 PG (ref 26.6–33)
MCHC RBC AUTO-ENTMCNC: 33.2 G/DL (ref 31.5–35.7)
MCV RBC AUTO: 85.9 FL (ref 79–97)
MONOCYTES # BLD AUTO: 0.34 10*3/MM3 (ref 0.1–0.9)
MONOCYTES NFR BLD AUTO: 6.7 % (ref 5–12)
NEUTROPHILS NFR BLD AUTO: 2.11 10*3/MM3 (ref 1.7–7)
NEUTROPHILS NFR BLD AUTO: 41.9 % (ref 42.7–76)
NRBC BLD AUTO-RTO: 0 /100 WBC (ref 0–0.2)
PHOSPHATE SERPL-MCNC: 3.6 MG/DL (ref 2.5–4.5)
PLATELET # BLD AUTO: 310 10*3/MM3 (ref 140–450)
PMV BLD AUTO: 10.5 FL (ref 6–12)
POTASSIUM SERPL-SCNC: 4 MMOL/L (ref 3.5–5.2)
PROT SERPL-MCNC: 6.8 G/DL (ref 6–8.5)
RBC # BLD AUTO: 4.03 10*6/MM3 (ref 3.77–5.28)
SODIUM SERPL-SCNC: 141 MMOL/L (ref 136–145)
WBC NRBC COR # BLD: 5.04 10*3/MM3 (ref 3.4–10.8)

## 2022-02-28 PROCEDURE — 82550 ASSAY OF CK (CPK): CPT | Performed by: INTERNAL MEDICINE

## 2022-02-28 PROCEDURE — 25010000002 ERTAPENEM PER 500 MG: Performed by: INTERNAL MEDICINE

## 2022-02-28 PROCEDURE — 80053 COMPREHEN METABOLIC PANEL: CPT | Performed by: INTERNAL MEDICINE

## 2022-02-28 PROCEDURE — 86140 C-REACTIVE PROTEIN: CPT | Performed by: INTERNAL MEDICINE

## 2022-02-28 PROCEDURE — 96368 THER/DIAG CONCURRENT INF: CPT

## 2022-02-28 PROCEDURE — 25010000002 DAPTOMYCIN PER 1 MG: Performed by: INTERNAL MEDICINE

## 2022-02-28 PROCEDURE — 84100 ASSAY OF PHOSPHORUS: CPT | Performed by: INTERNAL MEDICINE

## 2022-02-28 PROCEDURE — 85025 COMPLETE CBC W/AUTO DIFF WBC: CPT | Performed by: INTERNAL MEDICINE

## 2022-02-28 RX ORDER — SODIUM CHLORIDE 9 MG/ML
250 INJECTION, SOLUTION INTRAVENOUS ONCE
Status: DISCONTINUED | OUTPATIENT
Start: 2022-02-28 | End: 2022-03-02 | Stop reason: HOSPADM

## 2022-02-28 RX ORDER — SODIUM CHLORIDE 9 MG/ML
250 INJECTION, SOLUTION INTRAVENOUS ONCE
Status: CANCELLED | OUTPATIENT
Start: 2022-03-01

## 2022-02-28 RX ADMIN — DAPTOMYCIN 500 MG: 500 INJECTION, POWDER, LYOPHILIZED, FOR SOLUTION INTRAVENOUS at 08:58

## 2022-02-28 RX ADMIN — Medication 1 G: at 08:58

## 2022-03-01 ENCOUNTER — HOSPITAL ENCOUNTER (OUTPATIENT)
Dept: INFUSION THERAPY | Facility: HOSPITAL | Age: 67
Setting detail: INFUSION SERIES
Discharge: HOME | End: 2022-03-01

## 2022-03-01 VITALS
OXYGEN SATURATION: 96 % | HEART RATE: 68 BPM | SYSTOLIC BLOOD PRESSURE: 140 MMHG | DIASTOLIC BLOOD PRESSURE: 68 MMHG | TEMPERATURE: 97.8 F | RESPIRATION RATE: 20 BRPM

## 2022-03-01 DIAGNOSIS — M86.9 OSTEOMYELITIS OF FINGER OF LEFT HAND: Primary | ICD-10-CM

## 2022-03-01 PROCEDURE — 25010000002 ERTAPENEM PER 500 MG: Performed by: INTERNAL MEDICINE

## 2022-03-01 PROCEDURE — 96365 THER/PROPH/DIAG IV INF INIT: CPT

## 2022-03-01 PROCEDURE — 96368 THER/DIAG CONCURRENT INF: CPT

## 2022-03-01 PROCEDURE — 25010000002 DAPTOMYCIN PER 1 MG: Performed by: INTERNAL MEDICINE

## 2022-03-01 RX ORDER — SODIUM CHLORIDE 9 MG/ML
250 INJECTION, SOLUTION INTRAVENOUS ONCE
Status: CANCELLED | OUTPATIENT
Start: 2022-03-02

## 2022-03-01 RX ORDER — SODIUM CHLORIDE 9 MG/ML
250 INJECTION, SOLUTION INTRAVENOUS ONCE
Status: DISCONTINUED | OUTPATIENT
Start: 2022-03-01 | End: 2022-03-03 | Stop reason: HOSPADM

## 2022-03-01 RX ADMIN — ERTAPENEM SODIUM 1 G: 1 INJECTION, POWDER, LYOPHILIZED, FOR SOLUTION INTRAMUSCULAR; INTRAVENOUS at 10:45

## 2022-03-01 RX ADMIN — DAPTOMYCIN 500 MG: 500 INJECTION, POWDER, LYOPHILIZED, FOR SOLUTION INTRAVENOUS at 10:45

## 2022-03-02 ENCOUNTER — HOSPITAL ENCOUNTER (OUTPATIENT)
Dept: INFUSION THERAPY | Facility: HOSPITAL | Age: 67
Setting detail: INFUSION SERIES
Discharge: HOME | End: 2022-03-02

## 2022-03-02 VITALS — DIASTOLIC BLOOD PRESSURE: 77 MMHG | HEART RATE: 71 BPM | SYSTOLIC BLOOD PRESSURE: 122 MMHG | TEMPERATURE: 97.8 F

## 2022-03-02 DIAGNOSIS — M86.9 OSTEOMYELITIS OF FINGER OF LEFT HAND: Primary | ICD-10-CM

## 2022-03-02 PROCEDURE — 96365 THER/PROPH/DIAG IV INF INIT: CPT

## 2022-03-02 PROCEDURE — 25010000002 DAPTOMYCIN PER 1 MG: Performed by: INTERNAL MEDICINE

## 2022-03-02 PROCEDURE — 96368 THER/DIAG CONCURRENT INF: CPT

## 2022-03-02 PROCEDURE — 25010000002 ERTAPENEM PER 500 MG: Performed by: INTERNAL MEDICINE

## 2022-03-02 RX ORDER — SODIUM CHLORIDE 9 MG/ML
250 INJECTION, SOLUTION INTRAVENOUS ONCE
Status: CANCELLED | OUTPATIENT
Start: 2022-03-03

## 2022-03-02 RX ORDER — SODIUM CHLORIDE 9 MG/ML
250 INJECTION, SOLUTION INTRAVENOUS ONCE
Status: DISCONTINUED | OUTPATIENT
Start: 2022-03-02 | End: 2022-03-04 | Stop reason: HOSPADM

## 2022-03-02 RX ADMIN — DAPTOMYCIN 500 MG: 500 INJECTION, POWDER, LYOPHILIZED, FOR SOLUTION INTRAVENOUS at 09:40

## 2022-03-02 RX ADMIN — ERTAPENEM SODIUM 1 G: 1 INJECTION, POWDER, LYOPHILIZED, FOR SOLUTION INTRAMUSCULAR; INTRAVENOUS at 09:40

## 2022-03-03 ENCOUNTER — HOSPITAL ENCOUNTER (OUTPATIENT)
Dept: INFUSION THERAPY | Facility: HOSPITAL | Age: 67
Setting detail: INFUSION SERIES
Discharge: HOME | End: 2022-03-03

## 2022-03-03 VITALS
DIASTOLIC BLOOD PRESSURE: 65 MMHG | HEART RATE: 73 BPM | OXYGEN SATURATION: 97 % | RESPIRATION RATE: 16 BRPM | TEMPERATURE: 98.9 F | SYSTOLIC BLOOD PRESSURE: 103 MMHG

## 2022-03-03 DIAGNOSIS — M86.9 OSTEOMYELITIS OF FINGER OF LEFT HAND: Primary | ICD-10-CM

## 2022-03-03 PROCEDURE — 25010000002 ERTAPENEM PER 500 MG: Performed by: INTERNAL MEDICINE

## 2022-03-03 PROCEDURE — 25010000002 DAPTOMYCIN PER 1 MG: Performed by: INTERNAL MEDICINE

## 2022-03-03 PROCEDURE — 96365 THER/PROPH/DIAG IV INF INIT: CPT

## 2022-03-03 PROCEDURE — 96368 THER/DIAG CONCURRENT INF: CPT

## 2022-03-03 RX ORDER — SODIUM CHLORIDE 9 MG/ML
250 INJECTION, SOLUTION INTRAVENOUS ONCE
Status: CANCELLED | OUTPATIENT
Start: 2022-03-04

## 2022-03-03 RX ORDER — SODIUM CHLORIDE 9 MG/ML
250 INJECTION, SOLUTION INTRAVENOUS ONCE
Status: DISCONTINUED | OUTPATIENT
Start: 2022-03-03 | End: 2022-03-05 | Stop reason: HOSPADM

## 2022-03-03 RX ADMIN — DAPTOMYCIN 500 MG: 500 INJECTION, POWDER, LYOPHILIZED, FOR SOLUTION INTRAVENOUS at 10:31

## 2022-03-03 RX ADMIN — ERTAPENEM SODIUM 1 G: 1 INJECTION, POWDER, LYOPHILIZED, FOR SOLUTION INTRAMUSCULAR; INTRAVENOUS at 10:32

## 2022-03-04 ENCOUNTER — APPOINTMENT (OUTPATIENT)
Dept: MAMMOGRAPHY | Facility: HOSPITAL | Age: 67
End: 2022-03-04

## 2022-03-04 ENCOUNTER — HOSPITAL ENCOUNTER (OUTPATIENT)
Dept: INFUSION THERAPY | Facility: HOSPITAL | Age: 67
Setting detail: INFUSION SERIES
Discharge: HOME | End: 2022-03-04

## 2022-03-04 VITALS
DIASTOLIC BLOOD PRESSURE: 73 MMHG | HEART RATE: 70 BPM | SYSTOLIC BLOOD PRESSURE: 119 MMHG | TEMPERATURE: 98.1 F | RESPIRATION RATE: 16 BRPM

## 2022-03-04 DIAGNOSIS — M86.9 OSTEOMYELITIS OF FINGER OF LEFT HAND: Primary | ICD-10-CM

## 2022-03-04 PROCEDURE — 96365 THER/PROPH/DIAG IV INF INIT: CPT

## 2022-03-04 PROCEDURE — 25010000002 ERTAPENEM PER 500 MG: Performed by: INTERNAL MEDICINE

## 2022-03-04 PROCEDURE — 25010000002 DAPTOMYCIN PER 1 MG: Performed by: INTERNAL MEDICINE

## 2022-03-04 PROCEDURE — 96368 THER/DIAG CONCURRENT INF: CPT

## 2022-03-04 RX ORDER — SODIUM CHLORIDE 9 MG/ML
250 INJECTION, SOLUTION INTRAVENOUS ONCE
Status: DISCONTINUED | OUTPATIENT
Start: 2022-03-04 | End: 2022-03-08 | Stop reason: HOSPADM

## 2022-03-04 RX ORDER — SODIUM CHLORIDE 9 MG/ML
250 INJECTION, SOLUTION INTRAVENOUS ONCE
Status: CANCELLED | OUTPATIENT
Start: 2022-03-05

## 2022-03-04 RX ORDER — SODIUM CHLORIDE 9 MG/ML
250 INJECTION, SOLUTION INTRAVENOUS ONCE
Status: DISCONTINUED | OUTPATIENT
Start: 2022-03-04 | End: 2022-03-04 | Stop reason: SDUPTHER

## 2022-03-04 RX ADMIN — DAPTOMYCIN 500 MG: 500 INJECTION, POWDER, LYOPHILIZED, FOR SOLUTION INTRAVENOUS at 10:50

## 2022-03-04 RX ADMIN — ERTAPENEM SODIUM 1 G: 1 INJECTION, POWDER, LYOPHILIZED, FOR SOLUTION INTRAMUSCULAR; INTRAVENOUS at 10:50

## 2022-03-05 ENCOUNTER — HOSPITAL ENCOUNTER (OUTPATIENT)
Dept: INFUSION THERAPY | Facility: HOSPITAL | Age: 67
Setting detail: INFUSION SERIES
Discharge: HOME | End: 2022-03-05

## 2022-03-05 VITALS — SYSTOLIC BLOOD PRESSURE: 128 MMHG | DIASTOLIC BLOOD PRESSURE: 81 MMHG

## 2022-03-05 DIAGNOSIS — M86.9 OSTEOMYELITIS OF FINGER OF LEFT HAND: Primary | ICD-10-CM

## 2022-03-05 PROCEDURE — 96368 THER/DIAG CONCURRENT INF: CPT

## 2022-03-05 PROCEDURE — 25010000002 ERTAPENEM PER 500 MG: Performed by: INTERNAL MEDICINE

## 2022-03-05 PROCEDURE — 25010000002 DAPTOMYCIN PER 1 MG: Performed by: INTERNAL MEDICINE

## 2022-03-05 PROCEDURE — 96365 THER/PROPH/DIAG IV INF INIT: CPT

## 2022-03-05 RX ORDER — SODIUM CHLORIDE 9 MG/ML
250 INJECTION, SOLUTION INTRAVENOUS ONCE
Status: CANCELLED | OUTPATIENT
Start: 2022-03-06

## 2022-03-05 RX ORDER — SODIUM CHLORIDE 9 MG/ML
250 INJECTION, SOLUTION INTRAVENOUS ONCE
Status: DISCONTINUED | OUTPATIENT
Start: 2022-03-05 | End: 2022-03-07 | Stop reason: HOSPADM

## 2022-03-05 RX ADMIN — DAPTOMYCIN 500 MG: 500 INJECTION, POWDER, LYOPHILIZED, FOR SOLUTION INTRAVENOUS at 08:42

## 2022-03-05 RX ADMIN — ERTAPENEM SODIUM 1 G: 1 INJECTION, POWDER, LYOPHILIZED, FOR SOLUTION INTRAMUSCULAR; INTRAVENOUS at 08:42

## 2022-03-06 ENCOUNTER — HOSPITAL ENCOUNTER (OUTPATIENT)
Dept: INFUSION THERAPY | Facility: HOSPITAL | Age: 67
Setting detail: INFUSION SERIES
Discharge: HOME | End: 2022-03-06

## 2022-03-06 VITALS — HEART RATE: 66 BPM | SYSTOLIC BLOOD PRESSURE: 124 MMHG | DIASTOLIC BLOOD PRESSURE: 78 MMHG

## 2022-03-06 DIAGNOSIS — M86.9 OSTEOMYELITIS OF FINGER OF LEFT HAND: Primary | ICD-10-CM

## 2022-03-06 PROCEDURE — 96365 THER/PROPH/DIAG IV INF INIT: CPT

## 2022-03-06 PROCEDURE — 25010000002 ERTAPENEM PER 500 MG: Performed by: INTERNAL MEDICINE

## 2022-03-06 PROCEDURE — 96368 THER/DIAG CONCURRENT INF: CPT

## 2022-03-06 PROCEDURE — 25010000002 DAPTOMYCIN PER 1 MG: Performed by: INTERNAL MEDICINE

## 2022-03-06 PROCEDURE — 96366 THER/PROPH/DIAG IV INF ADDON: CPT

## 2022-03-06 RX ORDER — SODIUM CHLORIDE 9 MG/ML
250 INJECTION, SOLUTION INTRAVENOUS ONCE
Status: DISCONTINUED | OUTPATIENT
Start: 2022-03-06 | End: 2022-03-08 | Stop reason: HOSPADM

## 2022-03-06 RX ORDER — SODIUM CHLORIDE 9 MG/ML
250 INJECTION, SOLUTION INTRAVENOUS ONCE
Status: CANCELLED | OUTPATIENT
Start: 2022-03-07

## 2022-03-06 RX ADMIN — DAPTOMYCIN 500 MG: 500 INJECTION, POWDER, LYOPHILIZED, FOR SOLUTION INTRAVENOUS at 09:28

## 2022-03-06 RX ADMIN — ERTAPENEM SODIUM 1 G: 1 INJECTION, POWDER, LYOPHILIZED, FOR SOLUTION INTRAMUSCULAR; INTRAVENOUS at 09:28

## 2022-03-07 ENCOUNTER — TELEPHONE (OUTPATIENT)
Dept: CARDIOLOGY | Facility: CLINIC | Age: 67
End: 2022-03-07

## 2022-03-07 ENCOUNTER — OFFICE VISIT (OUTPATIENT)
Dept: CARDIOLOGY | Facility: CLINIC | Age: 67
End: 2022-03-07

## 2022-03-07 ENCOUNTER — HOSPITAL ENCOUNTER (OUTPATIENT)
Dept: INFUSION THERAPY | Facility: HOSPITAL | Age: 67
Setting detail: INFUSION SERIES
Discharge: HOME | End: 2022-03-07

## 2022-03-07 ENCOUNTER — TELEPHONE (OUTPATIENT)
Dept: GASTROENTEROLOGY | Facility: CLINIC | Age: 67
End: 2022-03-07

## 2022-03-07 VITALS
DIASTOLIC BLOOD PRESSURE: 68 MMHG | OXYGEN SATURATION: 93 % | SYSTOLIC BLOOD PRESSURE: 103 MMHG | RESPIRATION RATE: 14 BRPM | HEART RATE: 84 BPM | TEMPERATURE: 98 F

## 2022-03-07 VITALS
SYSTOLIC BLOOD PRESSURE: 100 MMHG | HEART RATE: 73 BPM | WEIGHT: 162 LBS | DIASTOLIC BLOOD PRESSURE: 72 MMHG | BODY MASS INDEX: 29.81 KG/M2 | HEIGHT: 62 IN | OXYGEN SATURATION: 95 %

## 2022-03-07 DIAGNOSIS — M86.9 OSTEOMYELITIS OF FINGER OF LEFT HAND: Primary | ICD-10-CM

## 2022-03-07 DIAGNOSIS — E78.2 MIXED HYPERLIPIDEMIA: ICD-10-CM

## 2022-03-07 DIAGNOSIS — I65.23 BILATERAL CAROTID ARTERY STENOSIS: ICD-10-CM

## 2022-03-07 DIAGNOSIS — I25.119 CORONARY ARTERY DISEASE INVOLVING NATIVE CORONARY ARTERY OF NATIVE HEART WITH ANGINA PECTORIS: Primary | ICD-10-CM

## 2022-03-07 LAB
ALBUMIN SERPL-MCNC: 3.7 G/DL (ref 3.5–5.2)
ALBUMIN/GLOB SERPL: 1.2 G/DL
ALP SERPL-CCNC: 98 U/L (ref 39–117)
ALT SERPL W P-5'-P-CCNC: 20 U/L (ref 1–33)
ANION GAP SERPL CALCULATED.3IONS-SCNC: 11.4 MMOL/L (ref 5–15)
AST SERPL-CCNC: 20 U/L (ref 1–32)
BASOPHILS # BLD AUTO: 0.08 10*3/MM3 (ref 0–0.2)
BASOPHILS NFR BLD AUTO: 1.3 % (ref 0–1.5)
BILIRUB SERPL-MCNC: 0.2 MG/DL (ref 0–1.2)
BUN SERPL-MCNC: 21 MG/DL (ref 8–23)
BUN/CREAT SERPL: 25.6 (ref 7–25)
CALCIUM SPEC-SCNC: 8.8 MG/DL (ref 8.6–10.5)
CHLORIDE SERPL-SCNC: 106 MMOL/L (ref 98–107)
CHOLEST SERPL-MCNC: 368 MG/DL (ref 0–200)
CK SERPL-CCNC: 71 U/L (ref 20–180)
CO2 SERPL-SCNC: 22.6 MMOL/L (ref 22–29)
CREAT SERPL-MCNC: 0.82 MG/DL (ref 0.57–1)
CRP SERPL-MCNC: 1.67 MG/DL (ref 0–0.5)
DEPRECATED RDW RBC AUTO: 42.5 FL (ref 37–54)
EGFRCR SERPLBLD CKD-EPI 2021: 78.5 ML/MIN/1.73
EOSINOPHIL # BLD AUTO: 0.54 10*3/MM3 (ref 0–0.4)
EOSINOPHIL NFR BLD AUTO: 9 % (ref 0.3–6.2)
ERYTHROCYTE [DISTWIDTH] IN BLOOD BY AUTOMATED COUNT: 13.7 % (ref 12.3–15.4)
GLOBULIN UR ELPH-MCNC: 3.1 GM/DL
GLUCOSE SERPL-MCNC: 106 MG/DL (ref 65–99)
HCT VFR BLD AUTO: 36.4 % (ref 34–46.6)
HDLC SERPL-MCNC: 59 MG/DL (ref 40–60)
HGB BLD-MCNC: 12.1 G/DL (ref 12–15.9)
IMM GRANULOCYTES # BLD AUTO: 0.01 10*3/MM3 (ref 0–0.05)
IMM GRANULOCYTES NFR BLD AUTO: 0.2 % (ref 0–0.5)
LDLC SERPL CALC-MCNC: 281 MG/DL (ref 0–100)
LDLC/HDLC SERPL: 4.74 {RATIO}
LYMPHOCYTES # BLD AUTO: 1.86 10*3/MM3 (ref 0.7–3.1)
LYMPHOCYTES NFR BLD AUTO: 30.8 % (ref 19.6–45.3)
MCH RBC QN AUTO: 28.6 PG (ref 26.6–33)
MCHC RBC AUTO-ENTMCNC: 33.2 G/DL (ref 31.5–35.7)
MCV RBC AUTO: 86.1 FL (ref 79–97)
MONOCYTES # BLD AUTO: 0.49 10*3/MM3 (ref 0.1–0.9)
MONOCYTES NFR BLD AUTO: 8.1 % (ref 5–12)
NEUTROPHILS NFR BLD AUTO: 3.05 10*3/MM3 (ref 1.7–7)
NEUTROPHILS NFR BLD AUTO: 50.6 % (ref 42.7–76)
NRBC BLD AUTO-RTO: 0 /100 WBC (ref 0–0.2)
PLATELET # BLD AUTO: 314 10*3/MM3 (ref 140–450)
PMV BLD AUTO: 10.6 FL (ref 6–12)
POTASSIUM SERPL-SCNC: 4.3 MMOL/L (ref 3.5–5.2)
PROT SERPL-MCNC: 6.8 G/DL (ref 6–8.5)
RBC # BLD AUTO: 4.23 10*6/MM3 (ref 3.77–5.28)
SODIUM SERPL-SCNC: 140 MMOL/L (ref 136–145)
TRIGL SERPL-MCNC: 146 MG/DL (ref 0–150)
VLDLC SERPL-MCNC: 28 MG/DL (ref 5–40)
WBC NRBC COR # BLD: 6.03 10*3/MM3 (ref 3.4–10.8)

## 2022-03-07 PROCEDURE — 36415 COLL VENOUS BLD VENIPUNCTURE: CPT

## 2022-03-07 PROCEDURE — 96368 THER/DIAG CONCURRENT INF: CPT

## 2022-03-07 PROCEDURE — 99214 OFFICE O/P EST MOD 30 MIN: CPT | Performed by: INTERNAL MEDICINE

## 2022-03-07 PROCEDURE — 25010000002 DAPTOMYCIN PER 1 MG: Performed by: INTERNAL MEDICINE

## 2022-03-07 PROCEDURE — 86140 C-REACTIVE PROTEIN: CPT | Performed by: INTERNAL MEDICINE

## 2022-03-07 PROCEDURE — 82550 ASSAY OF CK (CPK): CPT | Performed by: INTERNAL MEDICINE

## 2022-03-07 PROCEDURE — 25010000002 ERTAPENEM PER 500 MG: Performed by: INTERNAL MEDICINE

## 2022-03-07 PROCEDURE — 96365 THER/PROPH/DIAG IV INF INIT: CPT

## 2022-03-07 PROCEDURE — 80053 COMPREHEN METABOLIC PANEL: CPT | Performed by: INTERNAL MEDICINE

## 2022-03-07 PROCEDURE — 85025 COMPLETE CBC W/AUTO DIFF WBC: CPT | Performed by: INTERNAL MEDICINE

## 2022-03-07 PROCEDURE — 80061 LIPID PANEL: CPT | Performed by: INTERNAL MEDICINE

## 2022-03-07 RX ORDER — SODIUM CHLORIDE 9 MG/ML
250 INJECTION, SOLUTION INTRAVENOUS ONCE
Status: CANCELLED | OUTPATIENT
Start: 2022-03-08

## 2022-03-07 RX ORDER — SODIUM CHLORIDE 9 MG/ML
250 INJECTION, SOLUTION INTRAVENOUS ONCE
Status: DISCONTINUED | OUTPATIENT
Start: 2022-03-07 | End: 2022-03-09 | Stop reason: HOSPADM

## 2022-03-07 RX ADMIN — ERTAPENEM SODIUM 1 G: 1 INJECTION, POWDER, LYOPHILIZED, FOR SOLUTION INTRAMUSCULAR; INTRAVENOUS at 09:48

## 2022-03-07 RX ADMIN — DAPTOMYCIN 500 MG: 500 INJECTION, POWDER, LYOPHILIZED, FOR SOLUTION INTRAVENOUS at 09:48

## 2022-03-07 NOTE — PROGRESS NOTES
[facility]  Office visit  Krystina Henley  1955  [phone]  There is no work phone number on file.    VISIT DATE:  3/7/2022    PCP: Tre Lindquist MD  [address] [place] [id number]    CC:  Chief Complaint   Patient presents with   • Coronary Artery Disease       Previous cardiac studies and procedures:  March 2021   Anai scan myocardial perfusion imaging  · Left ventricular ejection fraction is hyperdynamic (Calculated EF > 70%). .  · Myocardial perfusion imaging indicates a large-sized, severe area of ischemia located in the anterior wall, apex and septal wall.  · Impressions are consistent with a high risk study.  · Findings consistent with an abnormal ECG stress test.  Cardiac catheterization:  · 90 to 95% ostial LAD stenosis.  · Normal LVEDP  · No aortic stenosis  Bilateral carotid duplex  · Left internal carotid artery stenosis of 50-69%.  · Proximal right internal carotid artery plaque without significant stenosis.  · Right internal carotid artery stenosis of 0-49%.  Transthoracic echocardiogram  · Left ventricular ejection fraction appears to be 66 - 70%. Left ventricular systolic function is normal.  · Left ventricular diastolic function is consistent with (grade I) impaired relaxation.  · Estimated right ventricular systolic pressure from tricuspid regurgitation is normal (<35 mmHg).    April 2021: MIDCAB STONE to LAD.    ASSESSMENT:   Diagnosis Plan   1. Coronary artery disease involving native coronary artery of native heart with angina pectoris (HCC)     2. Mixed hyperlipidemia  Lipid Panel   3. Bilateral carotid artery stenosis  Duplex Carotid Ultrasound CAR       PLAN:  Coronary artery disease: Currently stable and asymptomatic.  Continue aspirin 81 mg p.o. daily.  Afterload well controlled.    Hyperlipidemia: Goal LDL less than 70.  Intolerant to simvastatin, rosuvastatin, and lovastatin due to arthralgias.  Only tolerating Zetia 2-3 times per  "week due to myalgias.  Will likely require addition of PCSK9 inhibitor in the future.  Fasting lipid panel pending.    Carotid atherosclerosis: Mild right internal carotid stenosis, moderate left internal carotid artery stenosis.  Repeat ultrasound evaluation in 6 months prior to follow-up.  Continue aggressive risk factor modification.    Subjective  Interval assessment: Maintaining an active lifestyle without limitation.  Recovering from being followed by a dog in which she had significant bilateral hand and arm injury, actually developed osteomyelitis and currently is receiving 6 weeks of IV antibiotics..  Denies chest pain, dyspnea or palpitations.  Developed myalgias while taking Zetia daily, tolerating 3 days/week.    Initial evaluation: 65-year-old female with a history of familial hyperlipidemia and family history of early coronary disease presents with gradually progressive exertional chest discomfort.  Describes low precordial chest discomfort radiating to her back and shoulders and arms with such activities as walking up an incline.  The symptoms have occurred consistently over the previous 2 years.  She has been self-medicating with nitroglycerin, she will walk until she develops limiting chest discomfort and then stop and take a sublingual nitroglycerin until her pain resolves.  She reports that she can walk about 1/8 of a mile on level ground before sprinting limiting chest discomfort but experiences chest discomfort which is limiting she encounters any sort of incline.  She has previously been intolerant to atorvastatin and simvastatin with regard to myalgias.  Previously prescribed rosuvastatin which she has not started taking yet.     PHYSICAL EXAMINATION:  Vitals:    03/07/22 1131   BP: 100/72   BP Location: Left arm   Patient Position: Sitting   Pulse: 73   SpO2: 95%   Weight: 73.5 kg (162 lb)   Height: 157.5 cm (62\")     General Appearance:    Alert, cooperative, no distress, appears stated age "   Head:    Normocephalic, without obvious abnormality, atraumatic   Eyes:    conjunctiva/corneas clear   Nose:   Nares normal, septum midline, mucosa normal, no drainage   Throat:   Lips, teeth and gums normal   Neck:   Supple, symmetrical, trachea midline, no carotid    bruit or JVD   Lungs:     Clear to auscultation bilaterally, respirations unlabored   Chest Wall:    No tenderness or deformity    Heart:    Regular rate and rhythm, S1 and S2 normal, no murmur, rub   or gallop, normal carotid impulse bilaterally without bruit.                       Diagnostic Data:  Procedures  Lab Results   Component Value Date    CHLPL 320 (H) 02/24/2021    TRIG 80 05/20/2021    HDL 62 (H) 05/20/2021     Lab Results   Component Value Date    GLUCOSE 106 (H) 03/07/2022    BUN 21 03/07/2022    CREATININE 0.82 03/07/2022     03/07/2022    K 4.3 03/07/2022     03/07/2022    CO2 22.6 03/07/2022     No results found for: HGBA1C  Lab Results   Component Value Date    WBC 6.03 03/07/2022    HGB 12.1 03/07/2022    HCT 36.4 03/07/2022     03/07/2022       Allergies  No Known Allergies    Current Medications    Current Outpatient Medications:   •  amoxicillin-clavulanate (AUGMENTIN) 875-125 MG per tablet, Take 1 tablet by mouth 2 (Two) Times a Day., Disp: 20 tablet, Rfl: 0  •  aspirin (aspirin) 81 MG EC tablet, Take 1 tablet by mouth Daily., Disp: 30 tablet, Rfl: 1  •  ezetimibe (ZETIA) 10 MG tablet, Take 1 tablet by mouth Daily. (Patient taking differently: Take 10 mg by mouth 2 (Two) Times a Week.), Disp: 90 tablet, Rfl: 3  •  famotidine (PEPCID) 40 MG tablet, Take 1 tablet by mouth At Night As Needed for Heartburn., Disp: 30 tablet, Rfl: 2  •  HYDROcodone-acetaminophen (NORCO) 7.5-325 MG per tablet, Take 1 tablet by mouth Every 6 (Six) Hours As Needed for Moderate Pain ., Disp: 10 tablet, Rfl: 0  •  nitroglycerin (Nitrostat) 0.4 MG SL tablet, Place 1 tablet under the tongue Every 5 (Five) Minutes As Needed for Chest  Pain. Take no more than 3 doses in 15 minutes., Disp: 12 tablet, Rfl: 12  •  omeprazole (priLOSEC) 40 MG capsule, Take 1 capsule by mouth Daily. Take 30 minutes before breakfast, Disp: 30 capsule, Rfl: 2  •  ondansetron ODT (ZOFRAN-ODT) 4 MG disintegrating tablet, Place 1 tablet on the tongue 4 (Four) Times a Day As Needed for Nausea., Disp: 20 tablet, Rfl: 0  No current facility-administered medications for this visit.    Facility-Administered Medications Ordered in Other Visits:   •  DAPTOmycin (CUBICIN) 500 mg in sodium chloride 0.9 % IVPB-MBP, 500 mg, Intravenous, Once, Alin Recinos MD  •  ertapenem (INVanz) 1 g/100 mL 0.9% NS VTB (mbp), 1 g, Intravenous, Once, Alin Recinos MD  •  sodium chloride 0.9 % infusion 250 mL, 250 mL, Intravenous, Once, Alni Recinos MD  •  sodium chloride 0.9 % infusion 250 mL, 250 mL, Intravenous, Once, Alin Recinos MD  •  sodium chloride 0.9 % infusion 250 mL, 250 mL, Intravenous, Once, Alin Recinos MD          ROS  ROS      SOCIAL HX  Social History     Socioeconomic History   • Marital status:    Tobacco Use   • Smoking status: Former Smoker     Packs/day: 1.00     Years: 10.00     Pack years: 10.00     Types: Cigarettes     Quit date: 2016     Years since quittin.6   • Smokeless tobacco: Never Used   • Tobacco comment: Intermittent   Vaping Use   • Vaping Use: Never used   Substance and Sexual Activity   • Alcohol use: Yes     Alcohol/week: 0.0 standard drinks     Comment: social   • Drug use: No   • Sexual activity: Defer       FAMILY HX  Family History   Problem Relation Age of Onset   • Lung cancer Mother    • Lung cancer Father    • Prostate cancer Brother         x 3 brothers   • Heart attack Brother         x 2 brothers    • Heart disease Brother    • Colon cancer Neg Hx              Ernesto Corona III, MD, [facility]

## 2022-03-07 NOTE — TELEPHONE ENCOUNTER
----- Message from Ernesto Corona III, MD sent at 3/7/2022  1:09 PM EST -----  Cholesterol numbers have significantly increased.  (Needs PA for PCSK9 inhibitor)

## 2022-03-08 ENCOUNTER — HOSPITAL ENCOUNTER (OUTPATIENT)
Dept: INFUSION THERAPY | Facility: HOSPITAL | Age: 67
Setting detail: INFUSION SERIES
Discharge: HOME | End: 2022-03-08

## 2022-03-08 VITALS
HEART RATE: 83 BPM | OXYGEN SATURATION: 98 % | TEMPERATURE: 97.8 F | DIASTOLIC BLOOD PRESSURE: 68 MMHG | RESPIRATION RATE: 18 BRPM | SYSTOLIC BLOOD PRESSURE: 107 MMHG

## 2022-03-08 DIAGNOSIS — M86.9 OSTEOMYELITIS OF FINGER OF LEFT HAND: Primary | ICD-10-CM

## 2022-03-08 PROCEDURE — 25010000002 DAPTOMYCIN PER 1 MG: Performed by: INTERNAL MEDICINE

## 2022-03-08 PROCEDURE — 96368 THER/DIAG CONCURRENT INF: CPT

## 2022-03-08 PROCEDURE — 96365 THER/PROPH/DIAG IV INF INIT: CPT

## 2022-03-08 PROCEDURE — 25010000002 ERTAPENEM PER 500 MG: Performed by: INTERNAL MEDICINE

## 2022-03-08 RX ORDER — ALIROCUMAB 75 MG/ML
75 INJECTION, SOLUTION SUBCUTANEOUS
Qty: 2.24 ML | Refills: 11 | Status: SHIPPED | OUTPATIENT
Start: 2022-03-08 | End: 2022-06-20

## 2022-03-08 RX ORDER — SODIUM CHLORIDE 9 MG/ML
250 INJECTION, SOLUTION INTRAVENOUS ONCE
Status: DISCONTINUED | OUTPATIENT
Start: 2022-03-08 | End: 2022-03-10 | Stop reason: HOSPADM

## 2022-03-08 RX ORDER — SODIUM CHLORIDE 9 MG/ML
250 INJECTION, SOLUTION INTRAVENOUS ONCE
Status: CANCELLED | OUTPATIENT
Start: 2022-03-09

## 2022-03-08 RX ADMIN — ERTAPENEM SODIUM 1 G: 1 INJECTION, POWDER, LYOPHILIZED, FOR SOLUTION INTRAMUSCULAR; INTRAVENOUS at 10:19

## 2022-03-08 RX ADMIN — DAPTOMYCIN 500 MG: 500 INJECTION, POWDER, LYOPHILIZED, FOR SOLUTION INTRAVENOUS at 10:28

## 2022-03-09 ENCOUNTER — HOSPITAL ENCOUNTER (OUTPATIENT)
Dept: INFUSION THERAPY | Facility: HOSPITAL | Age: 67
Setting detail: INFUSION SERIES
Discharge: HOME | End: 2022-03-09

## 2022-03-09 VITALS
RESPIRATION RATE: 18 BRPM | TEMPERATURE: 97.7 F | OXYGEN SATURATION: 95 % | HEART RATE: 69 BPM | SYSTOLIC BLOOD PRESSURE: 105 MMHG | DIASTOLIC BLOOD PRESSURE: 69 MMHG

## 2022-03-09 DIAGNOSIS — M86.9 OSTEOMYELITIS OF FINGER OF LEFT HAND: Primary | ICD-10-CM

## 2022-03-09 PROCEDURE — 96365 THER/PROPH/DIAG IV INF INIT: CPT

## 2022-03-09 PROCEDURE — 25010000002 DAPTOMYCIN PER 1 MG: Performed by: INTERNAL MEDICINE

## 2022-03-09 PROCEDURE — 25010000002 ERTAPENEM PER 500 MG: Performed by: INTERNAL MEDICINE

## 2022-03-09 PROCEDURE — 96368 THER/DIAG CONCURRENT INF: CPT

## 2022-03-09 RX ORDER — SODIUM CHLORIDE 9 MG/ML
250 INJECTION, SOLUTION INTRAVENOUS ONCE
Status: DISCONTINUED | OUTPATIENT
Start: 2022-03-09 | End: 2022-03-11 | Stop reason: HOSPADM

## 2022-03-09 RX ORDER — SODIUM CHLORIDE 9 MG/ML
250 INJECTION, SOLUTION INTRAVENOUS ONCE
Status: CANCELLED | OUTPATIENT
Start: 2022-03-10

## 2022-03-09 RX ADMIN — DAPTOMYCIN 500 MG: 500 INJECTION, POWDER, LYOPHILIZED, FOR SOLUTION INTRAVENOUS at 10:32

## 2022-03-09 RX ADMIN — ERTAPENEM SODIUM 1 G: 1 INJECTION, POWDER, LYOPHILIZED, FOR SOLUTION INTRAMUSCULAR; INTRAVENOUS at 10:32

## 2022-03-10 ENCOUNTER — HOSPITAL ENCOUNTER (OUTPATIENT)
Dept: INFUSION THERAPY | Facility: HOSPITAL | Age: 67
Setting detail: INFUSION SERIES
Discharge: HOME | End: 2022-03-10

## 2022-03-10 VITALS
DIASTOLIC BLOOD PRESSURE: 70 MMHG | RESPIRATION RATE: 18 BRPM | TEMPERATURE: 97.7 F | HEART RATE: 81 BPM | OXYGEN SATURATION: 97 % | SYSTOLIC BLOOD PRESSURE: 111 MMHG

## 2022-03-10 DIAGNOSIS — M86.9 OSTEOMYELITIS OF FINGER OF LEFT HAND: Primary | ICD-10-CM

## 2022-03-10 PROCEDURE — 96368 THER/DIAG CONCURRENT INF: CPT

## 2022-03-10 PROCEDURE — 25010000002 DAPTOMYCIN PER 1 MG: Performed by: INTERNAL MEDICINE

## 2022-03-10 PROCEDURE — 25010000002 ERTAPENEM PER 500 MG: Performed by: INTERNAL MEDICINE

## 2022-03-10 PROCEDURE — 96365 THER/PROPH/DIAG IV INF INIT: CPT

## 2022-03-10 RX ORDER — SODIUM CHLORIDE 9 MG/ML
250 INJECTION, SOLUTION INTRAVENOUS ONCE
Status: CANCELLED | OUTPATIENT
Start: 2022-03-11

## 2022-03-10 RX ORDER — SODIUM CHLORIDE 9 MG/ML
250 INJECTION, SOLUTION INTRAVENOUS ONCE
Status: DISCONTINUED | OUTPATIENT
Start: 2022-03-10 | End: 2022-03-12 | Stop reason: HOSPADM

## 2022-03-10 RX ADMIN — ERTAPENEM SODIUM 1 G: 1 INJECTION, POWDER, LYOPHILIZED, FOR SOLUTION INTRAMUSCULAR; INTRAVENOUS at 09:51

## 2022-03-10 RX ADMIN — DAPTOMYCIN 500 MG: 500 INJECTION, POWDER, LYOPHILIZED, FOR SOLUTION INTRAVENOUS at 09:51

## 2022-03-10 NOTE — TELEPHONE ENCOUNTER
PA for Praluent 75mg/ml initiated today. Key # [id number].Case ID # [id number].Approved by Insurance company.Patient notified and [facility] notified of approval.Also information given on Praluent patient assistance program on line.Instructed her to call back if any questions. Verbalized understanding.

## 2022-03-11 ENCOUNTER — HOSPITAL ENCOUNTER (OUTPATIENT)
Dept: INFUSION THERAPY | Facility: HOSPITAL | Age: 67
Setting detail: INFUSION SERIES
Discharge: HOME | End: 2022-03-11

## 2022-03-11 VITALS
TEMPERATURE: 98 F | HEART RATE: 77 BPM | DIASTOLIC BLOOD PRESSURE: 69 MMHG | OXYGEN SATURATION: 96 % | RESPIRATION RATE: 14 BRPM | SYSTOLIC BLOOD PRESSURE: 115 MMHG

## 2022-03-11 DIAGNOSIS — M86.9 OSTEOMYELITIS OF FINGER OF LEFT HAND: Primary | ICD-10-CM

## 2022-03-11 PROCEDURE — 25010000002 ERTAPENEM PER 500 MG: Performed by: INTERNAL MEDICINE

## 2022-03-11 PROCEDURE — 25010000002 DAPTOMYCIN PER 1 MG: Performed by: INTERNAL MEDICINE

## 2022-03-11 PROCEDURE — 96365 THER/PROPH/DIAG IV INF INIT: CPT

## 2022-03-11 PROCEDURE — 96368 THER/DIAG CONCURRENT INF: CPT

## 2022-03-11 RX ORDER — SODIUM CHLORIDE 9 MG/ML
250 INJECTION, SOLUTION INTRAVENOUS ONCE
Status: CANCELLED | OUTPATIENT
Start: 2022-03-12

## 2022-03-11 RX ORDER — SODIUM CHLORIDE 9 MG/ML
250 INJECTION, SOLUTION INTRAVENOUS ONCE
Status: DISCONTINUED | OUTPATIENT
Start: 2022-03-11 | End: 2022-03-13 | Stop reason: HOSPADM

## 2022-03-11 RX ADMIN — ERTAPENEM SODIUM 1 G: 1 INJECTION, POWDER, LYOPHILIZED, FOR SOLUTION INTRAMUSCULAR; INTRAVENOUS at 10:21

## 2022-03-11 RX ADMIN — DAPTOMYCIN 500 MG: 500 INJECTION, POWDER, LYOPHILIZED, FOR SOLUTION INTRAVENOUS at 10:20

## 2022-03-12 ENCOUNTER — HOSPITAL ENCOUNTER (OUTPATIENT)
Dept: INFUSION THERAPY | Facility: HOSPITAL | Age: 67
Setting detail: INFUSION SERIES
Discharge: HOME | End: 2022-03-12

## 2022-03-12 VITALS
TEMPERATURE: 98.4 F | RESPIRATION RATE: 16 BRPM | DIASTOLIC BLOOD PRESSURE: 77 MMHG | SYSTOLIC BLOOD PRESSURE: 115 MMHG | HEART RATE: 65 BPM

## 2022-03-12 DIAGNOSIS — M86.9 OSTEOMYELITIS OF FINGER OF LEFT HAND: Primary | ICD-10-CM

## 2022-03-12 PROCEDURE — 25010000002 DAPTOMYCIN PER 1 MG: Performed by: INTERNAL MEDICINE

## 2022-03-12 PROCEDURE — 25010000002 ERTAPENEM PER 500 MG: Performed by: INTERNAL MEDICINE

## 2022-03-12 PROCEDURE — 96365 THER/PROPH/DIAG IV INF INIT: CPT

## 2022-03-12 PROCEDURE — 96368 THER/DIAG CONCURRENT INF: CPT

## 2022-03-12 RX ORDER — SODIUM CHLORIDE 9 MG/ML
250 INJECTION, SOLUTION INTRAVENOUS ONCE
Status: CANCELLED | OUTPATIENT
Start: 2022-03-13

## 2022-03-12 RX ADMIN — DAPTOMYCIN 500 MG: 500 INJECTION, POWDER, LYOPHILIZED, FOR SOLUTION INTRAVENOUS at 10:30

## 2022-03-12 RX ADMIN — ERTAPENEM SODIUM 1 G: 1 INJECTION, POWDER, LYOPHILIZED, FOR SOLUTION INTRAMUSCULAR; INTRAVENOUS at 10:22

## 2022-03-13 ENCOUNTER — HOSPITAL ENCOUNTER (OUTPATIENT)
Dept: INFUSION THERAPY | Facility: HOSPITAL | Age: 67
Setting detail: INFUSION SERIES
Discharge: HOME | End: 2022-03-13

## 2022-03-13 VITALS
HEART RATE: 68 BPM | DIASTOLIC BLOOD PRESSURE: 73 MMHG | RESPIRATION RATE: 16 BRPM | TEMPERATURE: 97.7 F | SYSTOLIC BLOOD PRESSURE: 131 MMHG

## 2022-03-13 DIAGNOSIS — M86.9 OSTEOMYELITIS OF FINGER OF LEFT HAND: Primary | ICD-10-CM

## 2022-03-13 PROCEDURE — 96365 THER/PROPH/DIAG IV INF INIT: CPT

## 2022-03-13 PROCEDURE — 25010000002 ERTAPENEM PER 500 MG: Performed by: INTERNAL MEDICINE

## 2022-03-13 PROCEDURE — 96368 THER/DIAG CONCURRENT INF: CPT

## 2022-03-13 PROCEDURE — 25010000002 DAPTOMYCIN PER 1 MG: Performed by: INTERNAL MEDICINE

## 2022-03-13 RX ORDER — SODIUM CHLORIDE 9 MG/ML
250 INJECTION, SOLUTION INTRAVENOUS ONCE
Status: CANCELLED | OUTPATIENT
Start: 2022-03-14

## 2022-03-13 RX ORDER — SODIUM CHLORIDE 9 MG/ML
250 INJECTION, SOLUTION INTRAVENOUS ONCE
Status: DISCONTINUED | OUTPATIENT
Start: 2022-03-13 | End: 2022-03-15 | Stop reason: HOSPADM

## 2022-03-13 RX ADMIN — DAPTOMYCIN 500 MG: 500 INJECTION, POWDER, LYOPHILIZED, FOR SOLUTION INTRAVENOUS at 09:28

## 2022-03-13 RX ADMIN — ERTAPENEM SODIUM 1 G: 1 INJECTION, POWDER, LYOPHILIZED, FOR SOLUTION INTRAMUSCULAR; INTRAVENOUS at 09:18

## 2022-03-14 ENCOUNTER — INFUSION (OUTPATIENT)
Dept: ONCOLOGY | Facility: HOSPITAL | Age: 67
End: 2022-03-14

## 2022-03-14 VITALS
TEMPERATURE: 98.4 F | DIASTOLIC BLOOD PRESSURE: 69 MMHG | RESPIRATION RATE: 12 BRPM | SYSTOLIC BLOOD PRESSURE: 106 MMHG | OXYGEN SATURATION: 96 % | HEART RATE: 66 BPM

## 2022-03-14 DIAGNOSIS — M86.9 OSTEOMYELITIS OF FINGER OF LEFT HAND: Primary | ICD-10-CM

## 2022-03-14 DIAGNOSIS — M86.9 FINGER OSTEOMYELITIS, LEFT: ICD-10-CM

## 2022-03-14 LAB
ALBUMIN SERPL-MCNC: 3.9 G/DL (ref 3.5–5.2)
ALBUMIN/GLOB SERPL: 1.1 G/DL
ALP SERPL-CCNC: 95 U/L (ref 39–117)
ALT SERPL W P-5'-P-CCNC: 19 U/L (ref 1–33)
ANION GAP SERPL CALCULATED.3IONS-SCNC: 11.8 MMOL/L (ref 5–15)
AST SERPL-CCNC: 19 U/L (ref 1–32)
BASOPHILS # BLD AUTO: 0.07 10*3/MM3 (ref 0–0.2)
BASOPHILS NFR BLD AUTO: 1.4 % (ref 0–1.5)
BILIRUB SERPL-MCNC: 0.2 MG/DL (ref 0–1.2)
BUN SERPL-MCNC: 18 MG/DL (ref 8–23)
BUN/CREAT SERPL: 24 (ref 7–25)
CALCIUM SPEC-SCNC: 9 MG/DL (ref 8.6–10.5)
CHLORIDE SERPL-SCNC: 102 MMOL/L (ref 98–107)
CK SERPL-CCNC: 77 U/L (ref 20–180)
CO2 SERPL-SCNC: 24.2 MMOL/L (ref 22–29)
CREAT SERPL-MCNC: 0.75 MG/DL (ref 0.57–1)
CRP SERPL-MCNC: 0.84 MG/DL (ref 0–0.5)
DEPRECATED RDW RBC AUTO: 41.8 FL (ref 37–54)
EGFRCR SERPLBLD CKD-EPI 2021: 87.4 ML/MIN/1.73
EOSINOPHIL # BLD AUTO: 0.12 10*3/MM3 (ref 0–0.4)
EOSINOPHIL NFR BLD AUTO: 2.4 % (ref 0.3–6.2)
ERYTHROCYTE [DISTWIDTH] IN BLOOD BY AUTOMATED COUNT: 13.5 % (ref 12.3–15.4)
GLOBULIN UR ELPH-MCNC: 3.4 GM/DL
GLUCOSE SERPL-MCNC: 94 MG/DL (ref 65–99)
HCT VFR BLD AUTO: 35.9 % (ref 34–46.6)
HGB BLD-MCNC: 12.2 G/DL (ref 12–15.9)
IMM GRANULOCYTES # BLD AUTO: 0.01 10*3/MM3 (ref 0–0.05)
IMM GRANULOCYTES NFR BLD AUTO: 0.2 % (ref 0–0.5)
LYMPHOCYTES # BLD AUTO: 1.62 10*3/MM3 (ref 0.7–3.1)
LYMPHOCYTES NFR BLD AUTO: 32.4 % (ref 19.6–45.3)
MCH RBC QN AUTO: 29 PG (ref 26.6–33)
MCHC RBC AUTO-ENTMCNC: 34 G/DL (ref 31.5–35.7)
MCV RBC AUTO: 85.3 FL (ref 79–97)
MONOCYTES # BLD AUTO: 0.4 10*3/MM3 (ref 0.1–0.9)
MONOCYTES NFR BLD AUTO: 8 % (ref 5–12)
NEUTROPHILS NFR BLD AUTO: 2.78 10*3/MM3 (ref 1.7–7)
NEUTROPHILS NFR BLD AUTO: 55.6 % (ref 42.7–76)
NRBC BLD AUTO-RTO: 0 /100 WBC (ref 0–0.2)
PLATELET # BLD AUTO: 258 10*3/MM3 (ref 140–450)
PMV BLD AUTO: 10.8 FL (ref 6–12)
POTASSIUM SERPL-SCNC: 4.4 MMOL/L (ref 3.5–5.2)
PROT SERPL-MCNC: 7.3 G/DL (ref 6–8.5)
RBC # BLD AUTO: 4.21 10*6/MM3 (ref 3.77–5.28)
SODIUM SERPL-SCNC: 138 MMOL/L (ref 136–145)
WBC NRBC COR # BLD: 5 10*3/MM3 (ref 3.4–10.8)

## 2022-03-14 PROCEDURE — 36415 COLL VENOUS BLD VENIPUNCTURE: CPT

## 2022-03-14 PROCEDURE — 82550 ASSAY OF CK (CPK): CPT

## 2022-03-14 PROCEDURE — 85025 COMPLETE CBC W/AUTO DIFF WBC: CPT

## 2022-03-14 PROCEDURE — 80053 COMPREHEN METABOLIC PANEL: CPT

## 2022-03-14 PROCEDURE — 25010000002 ERTAPENEM PER 500 MG: Performed by: INTERNAL MEDICINE

## 2022-03-14 PROCEDURE — 96368 THER/DIAG CONCURRENT INF: CPT

## 2022-03-14 PROCEDURE — 96365 THER/PROPH/DIAG IV INF INIT: CPT

## 2022-03-14 PROCEDURE — 25010000002 DAPTOMYCIN PER 1 MG: Performed by: INTERNAL MEDICINE

## 2022-03-14 PROCEDURE — 86140 C-REACTIVE PROTEIN: CPT

## 2022-03-14 RX ORDER — SODIUM CHLORIDE 9 MG/ML
250 INJECTION, SOLUTION INTRAVENOUS ONCE
Status: DISCONTINUED | OUTPATIENT
Start: 2022-03-14 | End: 2022-03-14 | Stop reason: HOSPADM

## 2022-03-14 RX ORDER — SODIUM CHLORIDE 9 MG/ML
250 INJECTION, SOLUTION INTRAVENOUS ONCE
Status: CANCELLED | OUTPATIENT
Start: 2022-03-15

## 2022-03-14 RX ADMIN — DAPTOMYCIN 500 MG: 500 INJECTION, POWDER, LYOPHILIZED, FOR SOLUTION INTRAVENOUS at 10:38

## 2022-03-14 RX ADMIN — ERTAPENEM SODIUM 1 G: 1 INJECTION, POWDER, LYOPHILIZED, FOR SOLUTION INTRAMUSCULAR; INTRAVENOUS at 10:39

## 2022-03-15 ENCOUNTER — HOSPITAL ENCOUNTER (OUTPATIENT)
Dept: INFUSION THERAPY | Facility: HOSPITAL | Age: 67
Setting detail: INFUSION SERIES
Discharge: HOME | End: 2022-03-15

## 2022-03-15 VITALS
SYSTOLIC BLOOD PRESSURE: 111 MMHG | OXYGEN SATURATION: 97 % | TEMPERATURE: 97.3 F | RESPIRATION RATE: 14 BRPM | HEART RATE: 75 BPM | DIASTOLIC BLOOD PRESSURE: 73 MMHG

## 2022-03-15 DIAGNOSIS — M86.9 OSTEOMYELITIS OF FINGER OF LEFT HAND: Primary | ICD-10-CM

## 2022-03-15 PROCEDURE — 96365 THER/PROPH/DIAG IV INF INIT: CPT

## 2022-03-15 PROCEDURE — 25010000002 DAPTOMYCIN PER 1 MG: Performed by: INTERNAL MEDICINE

## 2022-03-15 PROCEDURE — 96368 THER/DIAG CONCURRENT INF: CPT

## 2022-03-15 PROCEDURE — 25010000002 ERTAPENEM PER 500 MG: Performed by: INTERNAL MEDICINE

## 2022-03-15 RX ORDER — SODIUM CHLORIDE 9 MG/ML
250 INJECTION, SOLUTION INTRAVENOUS ONCE
Status: DISCONTINUED | OUTPATIENT
Start: 2022-03-15 | End: 2022-03-17 | Stop reason: HOSPADM

## 2022-03-15 RX ORDER — SODIUM CHLORIDE 9 MG/ML
250 INJECTION, SOLUTION INTRAVENOUS ONCE
Status: CANCELLED | OUTPATIENT
Start: 2022-03-16

## 2022-03-15 RX ADMIN — DAPTOMYCIN 500 MG: 500 INJECTION, POWDER, LYOPHILIZED, FOR SOLUTION INTRAVENOUS at 10:26

## 2022-03-15 RX ADMIN — ERTAPENEM SODIUM 1 G: 1 INJECTION, POWDER, LYOPHILIZED, FOR SOLUTION INTRAMUSCULAR; INTRAVENOUS at 10:17

## 2022-03-16 ENCOUNTER — HOSPITAL ENCOUNTER (OUTPATIENT)
Dept: INFUSION THERAPY | Facility: HOSPITAL | Age: 67
Setting detail: INFUSION SERIES
Discharge: HOME | End: 2022-03-16

## 2022-03-16 VITALS
TEMPERATURE: 97.5 F | RESPIRATION RATE: 16 BRPM | SYSTOLIC BLOOD PRESSURE: 130 MMHG | HEART RATE: 75 BPM | DIASTOLIC BLOOD PRESSURE: 82 MMHG | OXYGEN SATURATION: 96 %

## 2022-03-16 DIAGNOSIS — M86.9 OSTEOMYELITIS OF FINGER OF LEFT HAND: Primary | ICD-10-CM

## 2022-03-16 PROCEDURE — 96368 THER/DIAG CONCURRENT INF: CPT

## 2022-03-16 PROCEDURE — 96365 THER/PROPH/DIAG IV INF INIT: CPT

## 2022-03-16 PROCEDURE — 25010000002 ERTAPENEM PER 500 MG: Performed by: INTERNAL MEDICINE

## 2022-03-16 PROCEDURE — 25010000002 DAPTOMYCIN PER 1 MG: Performed by: INTERNAL MEDICINE

## 2022-03-16 RX ORDER — SODIUM CHLORIDE 9 MG/ML
250 INJECTION, SOLUTION INTRAVENOUS ONCE
Status: CANCELLED | OUTPATIENT
Start: 2022-03-17

## 2022-03-16 RX ADMIN — ERTAPENEM SODIUM 1 G: 1 INJECTION, POWDER, LYOPHILIZED, FOR SOLUTION INTRAMUSCULAR; INTRAVENOUS at 10:42

## 2022-03-16 RX ADMIN — DAPTOMYCIN 500 MG: 500 INJECTION, POWDER, LYOPHILIZED, FOR SOLUTION INTRAVENOUS at 10:44

## 2022-03-17 ENCOUNTER — HOSPITAL ENCOUNTER (OUTPATIENT)
Dept: INFUSION THERAPY | Facility: HOSPITAL | Age: 67
Setting detail: INFUSION SERIES
Discharge: HOME | End: 2022-03-17

## 2022-03-17 ENCOUNTER — OFFICE VISIT (OUTPATIENT)
Dept: GASTROENTEROLOGY | Facility: CLINIC | Age: 67
End: 2022-03-17

## 2022-03-17 VITALS
RESPIRATION RATE: 18 BRPM | TEMPERATURE: 96.9 F | OXYGEN SATURATION: 95 % | DIASTOLIC BLOOD PRESSURE: 82 MMHG | SYSTOLIC BLOOD PRESSURE: 127 MMHG | HEART RATE: 69 BPM

## 2022-03-17 VITALS
BODY MASS INDEX: 29.44 KG/M2 | DIASTOLIC BLOOD PRESSURE: 93 MMHG | SYSTOLIC BLOOD PRESSURE: 133 MMHG | HEIGHT: 62 IN | HEART RATE: 76 BPM | WEIGHT: 160 LBS | TEMPERATURE: 97.8 F

## 2022-03-17 DIAGNOSIS — K21.9 GASTROESOPHAGEAL REFLUX DISEASE, UNSPECIFIED WHETHER ESOPHAGITIS PRESENT: ICD-10-CM

## 2022-03-17 DIAGNOSIS — R10.11 RUQ ABDOMINAL PAIN: ICD-10-CM

## 2022-03-17 DIAGNOSIS — Z86.010 HISTORY OF ADENOMATOUS POLYP OF COLON: Primary | ICD-10-CM

## 2022-03-17 DIAGNOSIS — R13.19 ESOPHAGEAL DYSPHAGIA: ICD-10-CM

## 2022-03-17 DIAGNOSIS — M86.9 OSTEOMYELITIS OF FINGER OF LEFT HAND: Primary | ICD-10-CM

## 2022-03-17 PROCEDURE — 25010000002 ERTAPENEM PER 500 MG: Performed by: INTERNAL MEDICINE

## 2022-03-17 PROCEDURE — 25010000002 DAPTOMYCIN PER 1 MG: Performed by: INTERNAL MEDICINE

## 2022-03-17 PROCEDURE — 99214 OFFICE O/P EST MOD 30 MIN: CPT | Performed by: PHYSICIAN ASSISTANT

## 2022-03-17 PROCEDURE — 96368 THER/DIAG CONCURRENT INF: CPT

## 2022-03-17 PROCEDURE — 96365 THER/PROPH/DIAG IV INF INIT: CPT

## 2022-03-17 RX ORDER — SODIUM CHLORIDE 9 MG/ML
250 INJECTION, SOLUTION INTRAVENOUS ONCE
Status: CANCELLED | OUTPATIENT
Start: 2022-03-18

## 2022-03-17 RX ORDER — SODIUM CHLORIDE 9 MG/ML
250 INJECTION, SOLUTION INTRAVENOUS ONCE
Status: DISCONTINUED | OUTPATIENT
Start: 2022-03-17 | End: 2022-03-19 | Stop reason: HOSPADM

## 2022-03-17 RX ADMIN — ERTAPENEM SODIUM 1 G: 1 INJECTION, POWDER, LYOPHILIZED, FOR SOLUTION INTRAMUSCULAR; INTRAVENOUS at 09:59

## 2022-03-17 RX ADMIN — DAPTOMYCIN 500 MG: 500 INJECTION, POWDER, LYOPHILIZED, FOR SOLUTION INTRAVENOUS at 10:00

## 2022-03-17 NOTE — PROGRESS NOTES
Follow Up Note     Date: 2022   Patient Name: Krystina Henley  MRN: [id number]  : 1955     Primary Care Provider: Tre Lindquist MD     Chief Complaint   Patient presents with   • Follow-up     Post Colonoscopy   • Abdominal Pain   • Difficulty Swallowing   • Heartburn     History of present illness:   3/17/2022  Krystina Henley is a 66 y.o. female who is here today for follow up regarding recent colonoscopy, Abdominal Pain, Difficulty Swallowing, and Heartburn.     She had colonoscopy recently and would like to discuss those results.  Still with the same upper GI complaints as previous, EGD has been arranged. Still with RUQ abdominal pain. Has history of large (30 mm) polyp on previous colonoscopy.     Interval History:  2022  She is having heartburn daily. Reports reflux symptoms at least 4 nights per week, sometimes vomiting at night. Also has developed a gnawing pain in the RUQ which starts after eating but seems to progress during the meal and occurs for sometime after. Described as sometimes severe and even burning in nature. She has been taking omeprazole 40 mg once daily which has helped some but still with symptoms. She had tried stopping PPIs due to concern for long term side effects for a while and took pepcid instead. She feels that food is sitting in her chest, has history of esophageal dilatation. Does not feel that food is getting stuck while swallowing. Was asked to have repeat colonoscopy 1 year from her last due to history of advanced polyp, colonoscopy due now. BMs are described as daily and normal, has a past history of diverticulitis. No rectal bleeding. Previous anemia and elevated liver enzymes have resolved.      3/9/2021  There is a long standing history of reflux. She has been taking Omeprazole 20 mg twice a day and occasionally takes an Omeprazole 40 mg if she is having breakthrough reflux. Reflux is severe. She admits she does not follow anti-reflux  "guidelines and eats most foods she should be avoiding. She would like to try to switch to Pepcid due to risk of long term use of PPI therapy. She has not had any further episodes of diarrhea or rectal bleeding.     9/3/2020  Krystina Henley is a 64 y.o. female who is here today for follow up for after procedure.  Denies any rectal bleeding since the procedure.   Her bowel movements have regularized now and diarrhea resolved.  She is here to discuss the pathology report and biopsy findings     7/6/2020  The patient has a history of reflux off and on for the last several years.  The reflux is rather severe.  Symptoms are described as retrosternal burning sensation, and indigestion.  There is history of occasional regurgitative symptoms.  Frequency being several times per week.  The symptoms are worse at night.  The patient takes acid suppressive therapy with significant breakthrough symptoms.  The patient denies significant consumption of soda beverages, coffee or tea.  She denies eating chocolates.  The patient is a non-smoker.  She however drinks wine perhaps 2 drinks a week.  There is history of some recent weight gain of about 5 pounds over the last 1 month or so.  Her swallowing has significantly improved.     The patient has history of diarrhea off-and-on for the last several months.  Diarrhea is episodic and occurs perhaps couple of times a week.  Severity is mild, frequency of bowel movements being 2-3 times a day.  The stools are described as loose.  Occasionally, there is nocturnal element of diarrhea. The diarrhea is not associated with tenesmus.  The patient has been having recurrent bright red blood per rectum.  She has also noticed passage of \"clots\".  There is no associated dizziness.  There is some soreness at the rectal area.  There is no history of hematemesis or melena.  Her previous colonoscopy was in 2014.     There is no history of liver or pancreatic disease.  There is no family history of " colon cancer, inflammatory bowel disease, celiac disease or chronic liver disease.    Subjective     Past Medical History:   Diagnosis Date   • Abnormal ECG Ukn   • Anemia    • Anxiety    • Colon polyp 01/16/2014   • Coronary artery disease 04/2021   • Depression    • Diverticulosis    • Dog bite     arms, face, legs   • Dog bite    • Ear piercing    • Elevated cholesterol    • Essential hypertension     patient states she does not have hypertension   • Fibroid     uterine   • GERD (gastroesophageal reflux disease)    • Heartburn    • Hiatal hernia    • History of nuclear stress test 2021   • History of transfusion     no adverse reactions   • Hyperlipidemia    • Myocardial infarction (HCC)    • Osteoarthritis     generalized   • Osteomyelitis (HCC)    • PONV (postoperative nausea and vomiting)    • Seasonal allergies    • Tinnitus    • Wears glasses     reading only     Past Surgical History:   Procedure Laterality Date   • BLADDER SLING MODIFIED, ANTERIOR AND POSTERIOR VAGINAL REPAIR     • BREAST BIOPSY     • CARDIAC CATHETERIZATION N/A 03/18/2021    Procedure: Left Heart Cath;  Surgeon: Ernesto Corona III, MD;  Location:  [facility];  Service: Cardiovascular;  Laterality: N/A;   • CARDIAC CATHETERIZATION N/A 03/18/2021    Procedure: Percutaneous Coronary Intervention;  Surgeon: Fredrick Juares MD;  Location:  [facility];  Service: Cardiovascular;  Laterality: N/A;   • COLONOSCOPY  01/16/2014   • COLONOSCOPY N/A 07/31/2020    Procedure: COLONOSCOPY WITH BIOPSY AND HOT SNARE POLYPECTOMY;  Surgeon: Juan Coreas MD;  Location: [facility] ENDOSCOPY;  Service: Gastroenterology;  Laterality: N/A;   • COLONOSCOPY N/A 01/25/2021    Procedure: COLONOSCOPY WITH COLD BIOPSY POLYPECTOMY;  Surgeon: Juan oCreas MD;  Location: [facility] ENDOSCOPY;  Service: Gastroenterology;  Laterality: N/A;   • COLONOSCOPY N/A 01/21/2022    Procedure: COLONOSCOPY ;  Surgeon: Lyudmila  Juan LECHUGA MD;  Location: [facility] ENDOSCOPY;  Service: Gastroenterology;  Laterality: N/A;   • CORONARY ARTERY BYPASS GRAFT  2021   • ENDOSCOPY N/A 2020    Procedure: ESOPHAGOGASTRODUODENOSCOPY;  Surgeon: Marito Abdi MD;  Location: [facility] ENDOSCOPY;  Service: Gastroenterology;  Laterality: N/A;   • ENDOSCOPY N/A 2020    Procedure: ESOPHAGOGASTRODUODENOSCOPY;  Surgeon: Marito Abdi MD;  Location: [facility] ENDOSCOPY;  Service: Gastroenterology;  Laterality: N/A;   • FINGER SURGERY Left     Index finger   • HYSTERECTOMY      still has one ovary on left side   • UPPER GASTROINTESTINAL ENDOSCOPY  2014   • VAGINAL DELIVERY      x3   • WISDOM TOOTH EXTRACTION       Family History   Problem Relation Age of Onset   • Lung cancer Mother    • Lung cancer Father    • Prostate cancer Brother         x 3 brothers   • Heart attack Brother         x 2 brothers    • Heart disease Brother    • Colon cancer Neg Hx      Social History     Socioeconomic History   • Marital status:    Tobacco Use   • Smoking status: Former Smoker     Packs/day: 1.00     Years: 10.00     Pack years: 10.00     Types: Cigarettes     Quit date: 2016     Years since quittin.6   • Smokeless tobacco: Never Used   • Tobacco comment: Intermittent   Vaping Use   • Vaping Use: Never used   Substance and Sexual Activity   • Alcohol use: Yes     Alcohol/week: 0.0 standard drinks     Comment: social   • Drug use: Never   • Sexual activity: Defer     Current Outpatient Medications:   •  famotidine (PEPCID) 40 MG tablet, Take 1 tablet by mouth At Night As Needed for Heartburn., Disp: 30 tablet, Rfl: 2  •  nitroglycerin (Nitrostat) 0.4 MG SL tablet, Place 1 tablet under the tongue Every 5 (Five) Minutes As Needed for Chest Pain. Take no more than 3 doses in 15 minutes., Disp: 12 tablet, Rfl: 12  •  omeprazole (priLOSEC) 40 MG capsule, Take 1 capsule by mouth Daily. Take 30 minutes before breakfast, Disp: 30 capsule, Rfl: 2  •   ondansetron ODT (ZOFRAN-ODT) 4 MG disintegrating tablet, Place 1 tablet on the tongue 4 (Four) Times a Day As Needed for Nausea., Disp: 20 tablet, Rfl: 0  •  Alirocumab (Praluent) 75 MG/ML solution auto-injector, Inject 1 mL under the skin into the appropriate area as directed Every 14 (Fourteen) Days., Disp: 2.24 mL, Rfl: 11  •  aspirin (aspirin) 81 MG EC tablet, Take 1 tablet by mouth Daily., Disp: 30 tablet, Rfl: 1    No Known Allergies    The following portions of the patient's history were reviewed and updated as appropriate: allergies, current medications, past family history, past medical history, past social history, past surgical history and problem list.    Objective     Physical Exam  Constitutional:       General: She is not in acute distress.     Appearance: Normal appearance. She is well-developed. She is not diaphoretic.   HENT:      Head: Normocephalic and atraumatic.      Right Ear: External ear normal.      Left Ear: External ear normal.      Nose: Nose normal.      Mouth/Throat:      Comments: Wearing a mask  Eyes:      General: No scleral icterus.        Right eye: No discharge.         Left eye: No discharge.      Conjunctiva/sclera: Conjunctivae normal.   Neck:      Trachea: No tracheal deviation.   Pulmonary:      Effort: Pulmonary effort is normal. No respiratory distress.   Musculoskeletal:         General: Normal range of motion.      Cervical back: Normal range of motion.   Skin:     Coloration: Skin is not pale.      Findings: No erythema or rash.   Neurological:      Mental Status: She is alert and oriented to person, place, and time.      Coordination: Coordination normal.   Psychiatric:         Mood and Affect: Mood normal.         Behavior: Behavior normal.         Thought Content: Thought content normal.         Judgment: Judgment normal.       Vitals:    03/17/22 1108   BP: 133/93   Pulse: 76   Temp: 97.8 °F (36.6 °C)   TempSrc: Infrared   Weight: 72.6 kg (160 lb)   Height: 157.5 cm  "(62\")       Results Review:   I reviewed the patient's new clinical results.    Infusion on 03/14/2022   Component Date Value Ref Range Status   • C-Reactive Protein 03/14/2022 0.84 (A) 0.00 - 0.50 mg/dL Final   • Creatine Kinase 03/14/2022 77  20 - 180 U/L Final   • Glucose 03/14/2022 94  65 - 99 mg/dL Final   • BUN 03/14/2022 18  8 - 23 mg/dL Final   • Creatinine 03/14/2022 0.75  0.57 - 1.00 mg/dL Final   • Sodium 03/14/2022 138  136 - 145 mmol/L Final   • Potassium 03/14/2022 4.4  3.5 - 5.2 mmol/L Final   • Chloride 03/14/2022 102  98 - 107 mmol/L Final   • CO2 03/14/2022 24.2  22.0 - 29.0 mmol/L Final   • Calcium 03/14/2022 9.0  8.6 - 10.5 mg/dL Final   • Total Protein 03/14/2022 7.3  6.0 - 8.5 g/dL Final   • Albumin 03/14/2022 3.90  3.50 - 5.20 g/dL Final   • ALT (SGPT) 03/14/2022 19  1 - 33 U/L Final   • AST (SGOT) 03/14/2022 19  1 - 32 U/L Final   • Alkaline Phosphatase 03/14/2022 95  39 - 117 U/L Final   • Total Bilirubin 03/14/2022 0.2  0.0 - 1.2 mg/dL Final   • Globulin 03/14/2022 3.4  gm/dL Final   • A/G Ratio 03/14/2022 1.1  g/dL Final   • BUN/Creatinine Ratio 03/14/2022 24.0  7.0 - 25.0 Final   • Anion Gap 03/14/2022 11.8  5.0 - 15.0 mmol/L Final   • eGFR 03/14/2022 87.4  >60.0 mL/min/1.73 Final    National Kidney Foundation and American Society of Nephrology (ASN) Task Force recommended calculation based on the Chronic Kidney Disease Epidemiology Collaboration (CKD-EPI) equation refit without adjustment for race.   • WBC 03/14/2022 5.00  3.40 - 10.80 10*3/mm3 Final   • RBC 03/14/2022 4.21  3.77 - 5.28 10*6/mm3 Final   • Hemoglobin 03/14/2022 12.2  12.0 - 15.9 g/dL Final   • Hematocrit 03/14/2022 35.9  34.0 - 46.6 % Final   • MCV 03/14/2022 85.3  79.0 - 97.0 fL Final   • MCH 03/14/2022 29.0  26.6 - 33.0 pg Final   • MCHC 03/14/2022 34.0  31.5 - 35.7 g/dL Final   • RDW 03/14/2022 13.5  12.3 - 15.4 % Final   • RDW-SD 03/14/2022 41.8  37.0 - 54.0 fl Final   • MPV 03/14/2022 10.8  6.0 - 12.0 fL Final   • " Platelets 03/14/2022 258  140 - 450 10*3/mm3 Final      XR Hand 3+ View Bilateral  Result Date: 2/10/2022  Tiny posttraumatic bony defect of the tuft of the first distal phalanx with associated soft tissue injury    LEFT HAND  THREE VIEW  HISTORY: Dog bite  FINDINGS:  Three views show extensive debris/foreign body in the soft tissues at the level of the distal phalanx of the second digit. No fracture is identified. Scattered degenerative changes are noted.  IMPRESSION: No acute fracture         Labs 3/2021  - Hepatitis A Antibody, Total; negative  - Hepatitis B Surf Antibody; positive  - Hepatitis B Surface Antigen; negative  - Hepatitis B Core Antibody, Total; negative     No radiology results for the last 90 days.      Dated Yari 3, 2020 the patient underwent an upper endoscopy which revealed: Erosive distal esophagitis.  LA class A.  Distal esophageal angulation and early stricturing.  Status post dilation to 18 mm.  Free gastroesophageal reflux.  Moderate sliding hiatal hernia around 3 to 4 cm.  Erythematous gastritis.  No definite Castellanos's.  Second portion of duodenum, biopsy revealed unremarkable duodenal mucosa with preserved villous architecture.  Antrum, body and fundus, biopsies revealed reactive gastropathy with minimal chronic inactive gastritis.  No Helicobacter organisms identified.  Negative for intestinal metaplasia, dysplasia and malignancy.  Stomach, body and fundus, polyps, biopsy revealed polypoid fragments of oxyntic mucosa with minimal chronic inactive gastritis and mild foveolar hyperplasia.  No Helicobacter organisms identified.  Negative for intestinal metaplasia, dysplasia and malignancy.     Colonoscopy dated 1/25/2021 prior polypectomy site rectosigmoid appeared normal.  1 3 mm polyp in sigmoid colon removed.  Diverticulosis in sigmoid colon.  Nonbleeding internal hemorrhoids.  Otherwise unremarkable.  Sigmoid colon polyp biopsy with hyperplastic polyp.    Colonoscopy was completed   Lyudmila on 1/21/2022:  - The perianal and digital rectal examinations were normal.  - A few small-mouthed diverticula were found in the sigmoid colon.  - Anal papilla(e) were hypertrophied.  - The terminal ileum appeared normal.  No specimens collected.    Assessment / Plan      1. History of adenomatous polyp of colon and large tubulovillous colon polyp  Colonoscopy 1/2022 showed normal colon mucosa, no colon polyps were removed.  She had diverticulosis of the sigmoid colon. Repeat colonoscopy in 3 years for surveillance due to large advanced villious adenoma with dysplasia excised, due 01/2025. We will place her on the recall list to be called to schedule an appointment closer to that date. She was instructed to call the office if no reminder call is made within the proper number of years. Also, the indications for a repeat colonoscopy sooner than the recall date were discussed; rectal bleeding, melena, change in bowel habits or significant abdominal pain.    Prior colonoscopy in January 2021 with 1 small hyperplastic sigmoid polyp removed. Prior polypectomy site unremarkable.   Prior colonoscopy done on 7/31/2020  revealed 8 to 10 mm polyp in the ascending colon and 2 more in the hepatic flexure. 2 of these polyps were tubular adenoma and 1 of them sessile serrated adenoma without any dysplasia. She had a large 25 to 30 mm pedunculated lesion in the rectosigmoid junction which was removed with a hot snare piecemeal.  Pathology consistent with a tubulovillous adenoma with high-grade dysplasia.  Polyp stalk did not reveal any high-grade dysplasia.   There is no known family history of colon cancer or colon polyps.    2. RUQ abdominal pain  3. Esophageal dysphagia  4. Gastroesophageal reflux disease, unspecified whether esophagitis present  History of severe reflux with current worsening of symptoms with frequent heartburn and reflux symptoms worse at night. GERD currently controlled with omeprazole 40 mg once  daily and pepcid 40 mg PRN heartburn nightly. She describes sensation that food is sitting in her chest and not going down with meals.   EGD done in June 2020 revealed lower esophagus peptic stricture which was dilated to 18 mm with a savory dilator.      Acid reflux measures  Continue omeprazole 40 mg once daily  Continue Pepcid 40 mg nightly  EGD with possible dilatation has previously been arranged          Follow Up:   Return for follow up after procedure to discuss results.      Pippa Hand PA-C  [facility]  3/17/2022  13:14 EDT    Please note that portions of this note may have been completed with a voice recognition program. Efforts were made to edit the dictations, but occasionally words are mistranscribed.

## 2022-03-18 ENCOUNTER — HOSPITAL ENCOUNTER (OUTPATIENT)
Dept: INFUSION THERAPY | Facility: HOSPITAL | Age: 67
Setting detail: INFUSION SERIES
Discharge: HOME | End: 2022-03-18

## 2022-03-18 VITALS
HEART RATE: 76 BPM | TEMPERATURE: 97.9 F | SYSTOLIC BLOOD PRESSURE: 116 MMHG | DIASTOLIC BLOOD PRESSURE: 72 MMHG | RESPIRATION RATE: 16 BRPM

## 2022-03-18 DIAGNOSIS — M86.9 OSTEOMYELITIS OF FINGER OF LEFT HAND: Primary | ICD-10-CM

## 2022-03-18 PROCEDURE — 25010000002 ERTAPENEM PER 500 MG: Performed by: INTERNAL MEDICINE

## 2022-03-18 PROCEDURE — 25010000002 DAPTOMYCIN PER 1 MG: Performed by: INTERNAL MEDICINE

## 2022-03-18 PROCEDURE — 96365 THER/PROPH/DIAG IV INF INIT: CPT

## 2022-03-18 PROCEDURE — 96368 THER/DIAG CONCURRENT INF: CPT

## 2022-03-18 RX ORDER — SODIUM CHLORIDE 9 MG/ML
250 INJECTION, SOLUTION INTRAVENOUS ONCE
Status: DISCONTINUED | OUTPATIENT
Start: 2022-03-18 | End: 2022-03-20 | Stop reason: HOSPADM

## 2022-03-18 RX ORDER — SODIUM CHLORIDE 9 MG/ML
250 INJECTION, SOLUTION INTRAVENOUS ONCE
Status: CANCELLED | OUTPATIENT
Start: 2022-03-19

## 2022-03-18 RX ADMIN — ERTAPENEM SODIUM 1 G: 1 INJECTION, POWDER, LYOPHILIZED, FOR SOLUTION INTRAMUSCULAR; INTRAVENOUS at 10:09

## 2022-03-18 RX ADMIN — DAPTOMYCIN 500 MG: 500 INJECTION, POWDER, LYOPHILIZED, FOR SOLUTION INTRAVENOUS at 10:09

## 2022-03-19 ENCOUNTER — HOSPITAL ENCOUNTER (OUTPATIENT)
Dept: INFUSION THERAPY | Facility: HOSPITAL | Age: 67
Setting detail: INFUSION SERIES
Discharge: HOME | End: 2022-03-19

## 2022-03-19 VITALS
HEART RATE: 67 BPM | TEMPERATURE: 98 F | DIASTOLIC BLOOD PRESSURE: 97 MMHG | SYSTOLIC BLOOD PRESSURE: 133 MMHG | OXYGEN SATURATION: 97 % | RESPIRATION RATE: 18 BRPM

## 2022-03-19 DIAGNOSIS — M86.9 OSTEOMYELITIS OF FINGER OF LEFT HAND: Primary | ICD-10-CM

## 2022-03-19 PROCEDURE — 25010000002 DAPTOMYCIN PER 1 MG: Performed by: INTERNAL MEDICINE

## 2022-03-19 PROCEDURE — 25010000002 ERTAPENEM PER 500 MG: Performed by: INTERNAL MEDICINE

## 2022-03-19 PROCEDURE — 96368 THER/DIAG CONCURRENT INF: CPT

## 2022-03-19 PROCEDURE — 96365 THER/PROPH/DIAG IV INF INIT: CPT

## 2022-03-19 RX ORDER — SODIUM CHLORIDE 9 MG/ML
250 INJECTION, SOLUTION INTRAVENOUS ONCE
Status: CANCELLED | OUTPATIENT
Start: 2022-03-20

## 2022-03-19 RX ORDER — SODIUM CHLORIDE 9 MG/ML
250 INJECTION, SOLUTION INTRAVENOUS ONCE
Status: DISCONTINUED | OUTPATIENT
Start: 2022-03-19 | End: 2022-03-21 | Stop reason: HOSPADM

## 2022-03-19 RX ADMIN — DAPTOMYCIN 500 MG: 500 INJECTION, POWDER, LYOPHILIZED, FOR SOLUTION INTRAVENOUS at 09:19

## 2022-03-19 RX ADMIN — Medication 1 G: at 09:20

## 2022-03-20 ENCOUNTER — HOSPITAL ENCOUNTER (OUTPATIENT)
Dept: INFUSION THERAPY | Facility: HOSPITAL | Age: 67
Setting detail: INFUSION SERIES
Discharge: HOME | End: 2022-03-20

## 2022-03-20 DIAGNOSIS — M86.9 OSTEOMYELITIS OF FINGER OF LEFT HAND: Primary | ICD-10-CM

## 2022-03-20 PROCEDURE — 25010000002 ERTAPENEM PER 500 MG: Performed by: INTERNAL MEDICINE

## 2022-03-20 PROCEDURE — 96365 THER/PROPH/DIAG IV INF INIT: CPT

## 2022-03-20 PROCEDURE — 25010000002 DAPTOMYCIN PER 1 MG: Performed by: INTERNAL MEDICINE

## 2022-03-20 PROCEDURE — 96368 THER/DIAG CONCURRENT INF: CPT

## 2022-03-20 RX ORDER — SODIUM CHLORIDE 9 MG/ML
250 INJECTION, SOLUTION INTRAVENOUS ONCE
Status: CANCELLED | OUTPATIENT
Start: 2022-03-21

## 2022-03-20 RX ORDER — SODIUM CHLORIDE 9 MG/ML
250 INJECTION, SOLUTION INTRAVENOUS ONCE
Status: DISCONTINUED | OUTPATIENT
Start: 2022-03-20 | End: 2022-03-22 | Stop reason: HOSPADM

## 2022-03-20 RX ADMIN — DAPTOMYCIN 500 MG: 500 INJECTION, POWDER, LYOPHILIZED, FOR SOLUTION INTRAVENOUS at 09:23

## 2022-03-20 RX ADMIN — ERTAPENEM SODIUM 1 G: 1 INJECTION, POWDER, LYOPHILIZED, FOR SOLUTION INTRAMUSCULAR; INTRAVENOUS at 09:25

## 2022-03-21 ENCOUNTER — HOSPITAL ENCOUNTER (OUTPATIENT)
Dept: INFUSION THERAPY | Facility: HOSPITAL | Age: 67
Setting detail: INFUSION SERIES
Discharge: HOME | End: 2022-03-21

## 2022-03-21 VITALS
OXYGEN SATURATION: 99 % | TEMPERATURE: 97.7 F | SYSTOLIC BLOOD PRESSURE: 128 MMHG | DIASTOLIC BLOOD PRESSURE: 62 MMHG | HEART RATE: 62 BPM | RESPIRATION RATE: 18 BRPM

## 2022-03-21 DIAGNOSIS — M86.9 OSTEOMYELITIS OF FINGER OF LEFT HAND: Primary | ICD-10-CM

## 2022-03-21 LAB
ALBUMIN SERPL-MCNC: 3.9 G/DL (ref 3.5–5.2)
ALBUMIN/GLOB SERPL: 1.3 G/DL
ALP SERPL-CCNC: 101 U/L (ref 39–117)
ALT SERPL W P-5'-P-CCNC: 29 U/L (ref 1–33)
ANION GAP SERPL CALCULATED.3IONS-SCNC: 10.5 MMOL/L (ref 5–15)
AST SERPL-CCNC: 39 U/L (ref 1–32)
BASOPHILS # BLD AUTO: 0.07 10*3/MM3 (ref 0–0.2)
BASOPHILS NFR BLD AUTO: 1.3 % (ref 0–1.5)
BILIRUB SERPL-MCNC: 0.2 MG/DL (ref 0–1.2)
BUN SERPL-MCNC: 16 MG/DL (ref 8–23)
BUN/CREAT SERPL: 19.8 (ref 7–25)
CALCIUM SPEC-SCNC: 9 MG/DL (ref 8.6–10.5)
CHLORIDE SERPL-SCNC: 104 MMOL/L (ref 98–107)
CK SERPL-CCNC: 78 U/L (ref 20–180)
CO2 SERPL-SCNC: 22.5 MMOL/L (ref 22–29)
CREAT SERPL-MCNC: 0.81 MG/DL (ref 0.57–1)
CRP SERPL-MCNC: 0.9 MG/DL (ref 0–0.5)
DEPRECATED RDW RBC AUTO: 44.6 FL (ref 37–54)
EGFRCR SERPLBLD CKD-EPI 2021: 79.7 ML/MIN/1.73
EOSINOPHIL # BLD AUTO: 0.33 10*3/MM3 (ref 0–0.4)
EOSINOPHIL NFR BLD AUTO: 6.3 % (ref 0.3–6.2)
ERYTHROCYTE [DISTWIDTH] IN BLOOD BY AUTOMATED COUNT: 14 % (ref 12.3–15.4)
GLOBULIN UR ELPH-MCNC: 3.1 GM/DL
GLUCOSE SERPL-MCNC: 88 MG/DL (ref 65–99)
HCT VFR BLD AUTO: 36.9 % (ref 34–46.6)
HGB BLD-MCNC: 12.1 G/DL (ref 12–15.9)
IMM GRANULOCYTES # BLD AUTO: 0.01 10*3/MM3 (ref 0–0.05)
IMM GRANULOCYTES NFR BLD AUTO: 0.2 % (ref 0–0.5)
LYMPHOCYTES # BLD AUTO: 2.25 10*3/MM3 (ref 0.7–3.1)
LYMPHOCYTES NFR BLD AUTO: 43.3 % (ref 19.6–45.3)
MCH RBC QN AUTO: 28.5 PG (ref 26.6–33)
MCHC RBC AUTO-ENTMCNC: 32.8 G/DL (ref 31.5–35.7)
MCV RBC AUTO: 86.8 FL (ref 79–97)
MONOCYTES # BLD AUTO: 0.48 10*3/MM3 (ref 0.1–0.9)
MONOCYTES NFR BLD AUTO: 9.2 % (ref 5–12)
NEUTROPHILS NFR BLD AUTO: 2.06 10*3/MM3 (ref 1.7–7)
NEUTROPHILS NFR BLD AUTO: 39.7 % (ref 42.7–76)
NRBC BLD AUTO-RTO: 0 /100 WBC (ref 0–0.2)
PLATELET # BLD AUTO: 255 10*3/MM3 (ref 140–450)
PMV BLD AUTO: 10.7 FL (ref 6–12)
POTASSIUM SERPL-SCNC: 4.5 MMOL/L (ref 3.5–5.2)
PROT SERPL-MCNC: 7 G/DL (ref 6–8.5)
RBC # BLD AUTO: 4.25 10*6/MM3 (ref 3.77–5.28)
SODIUM SERPL-SCNC: 137 MMOL/L (ref 136–145)
WBC NRBC COR # BLD: 5.2 10*3/MM3 (ref 3.4–10.8)

## 2022-03-21 PROCEDURE — 85025 COMPLETE CBC W/AUTO DIFF WBC: CPT | Performed by: INTERNAL MEDICINE

## 2022-03-21 PROCEDURE — 86140 C-REACTIVE PROTEIN: CPT | Performed by: INTERNAL MEDICINE

## 2022-03-21 PROCEDURE — 96365 THER/PROPH/DIAG IV INF INIT: CPT

## 2022-03-21 PROCEDURE — 96368 THER/DIAG CONCURRENT INF: CPT

## 2022-03-21 PROCEDURE — 82550 ASSAY OF CK (CPK): CPT | Performed by: INTERNAL MEDICINE

## 2022-03-21 PROCEDURE — 36415 COLL VENOUS BLD VENIPUNCTURE: CPT

## 2022-03-21 PROCEDURE — 25010000002 ERTAPENEM PER 500 MG: Performed by: INTERNAL MEDICINE

## 2022-03-21 PROCEDURE — 80053 COMPREHEN METABOLIC PANEL: CPT | Performed by: INTERNAL MEDICINE

## 2022-03-21 PROCEDURE — 25010000002 DAPTOMYCIN PER 1 MG: Performed by: INTERNAL MEDICINE

## 2022-03-21 RX ORDER — SODIUM CHLORIDE 9 MG/ML
250 INJECTION, SOLUTION INTRAVENOUS ONCE
Status: CANCELLED | OUTPATIENT
Start: 2022-03-22

## 2022-03-21 RX ORDER — SODIUM CHLORIDE 9 MG/ML
250 INJECTION, SOLUTION INTRAVENOUS ONCE
Status: DISCONTINUED | OUTPATIENT
Start: 2022-03-21 | End: 2022-03-23 | Stop reason: HOSPADM

## 2022-03-21 RX ADMIN — DAPTOMYCIN 500 MG: 500 INJECTION, POWDER, LYOPHILIZED, FOR SOLUTION INTRAVENOUS at 09:22

## 2022-03-21 RX ADMIN — ERTAPENEM SODIUM 1 G: 1 INJECTION, POWDER, LYOPHILIZED, FOR SOLUTION INTRAMUSCULAR; INTRAVENOUS at 09:22

## 2022-03-22 ENCOUNTER — HOSPITAL ENCOUNTER (OUTPATIENT)
Dept: INFUSION THERAPY | Facility: HOSPITAL | Age: 67
Setting detail: INFUSION SERIES
Discharge: HOME | End: 2022-03-22

## 2022-03-22 VITALS
OXYGEN SATURATION: 97 % | TEMPERATURE: 98.2 F | HEART RATE: 78 BPM | RESPIRATION RATE: 18 BRPM | SYSTOLIC BLOOD PRESSURE: 104 MMHG | DIASTOLIC BLOOD PRESSURE: 70 MMHG

## 2022-03-22 DIAGNOSIS — M86.9 OSTEOMYELITIS OF FINGER OF LEFT HAND: Primary | ICD-10-CM

## 2022-03-22 PROCEDURE — 96365 THER/PROPH/DIAG IV INF INIT: CPT

## 2022-03-22 PROCEDURE — 25010000002 ERTAPENEM PER 500 MG: Performed by: INTERNAL MEDICINE

## 2022-03-22 PROCEDURE — 96368 THER/DIAG CONCURRENT INF: CPT

## 2022-03-22 PROCEDURE — 25010000002 DAPTOMYCIN PER 1 MG: Performed by: INTERNAL MEDICINE

## 2022-03-22 RX ORDER — SODIUM CHLORIDE 9 MG/ML
250 INJECTION, SOLUTION INTRAVENOUS ONCE
Status: DISCONTINUED | OUTPATIENT
Start: 2022-03-22 | End: 2022-03-24 | Stop reason: HOSPADM

## 2022-03-22 RX ORDER — SODIUM CHLORIDE 9 MG/ML
250 INJECTION, SOLUTION INTRAVENOUS ONCE
Status: CANCELLED | OUTPATIENT
Start: 2022-03-23

## 2022-03-22 RX ADMIN — ERTAPENEM SODIUM 1 G: 1 INJECTION, POWDER, LYOPHILIZED, FOR SOLUTION INTRAMUSCULAR; INTRAVENOUS at 10:33

## 2022-03-22 RX ADMIN — DAPTOMYCIN 500 MG: 500 INJECTION, POWDER, LYOPHILIZED, FOR SOLUTION INTRAVENOUS at 10:33

## 2022-03-23 ENCOUNTER — HOSPITAL ENCOUNTER (OUTPATIENT)
Dept: INFUSION THERAPY | Facility: HOSPITAL | Age: 67
Setting detail: INFUSION SERIES
Discharge: HOME | End: 2022-03-23

## 2022-03-23 VITALS
OXYGEN SATURATION: 97 % | DIASTOLIC BLOOD PRESSURE: 67 MMHG | RESPIRATION RATE: 20 BRPM | HEART RATE: 63 BPM | SYSTOLIC BLOOD PRESSURE: 106 MMHG | TEMPERATURE: 98.6 F

## 2022-03-23 DIAGNOSIS — M86.9 OSTEOMYELITIS OF FINGER OF LEFT HAND: Primary | ICD-10-CM

## 2022-03-23 PROCEDURE — 96365 THER/PROPH/DIAG IV INF INIT: CPT

## 2022-03-23 PROCEDURE — 25010000002 DAPTOMYCIN PER 1 MG: Performed by: INTERNAL MEDICINE

## 2022-03-23 PROCEDURE — 96368 THER/DIAG CONCURRENT INF: CPT

## 2022-03-23 PROCEDURE — 25010000002 ERTAPENEM PER 500 MG: Performed by: INTERNAL MEDICINE

## 2022-03-23 RX ORDER — SODIUM CHLORIDE 9 MG/ML
250 INJECTION, SOLUTION INTRAVENOUS ONCE
Status: DISCONTINUED | OUTPATIENT
Start: 2022-03-23 | End: 2022-03-25 | Stop reason: HOSPADM

## 2022-03-23 RX ORDER — SODIUM CHLORIDE 9 MG/ML
250 INJECTION, SOLUTION INTRAVENOUS ONCE
Status: CANCELLED | OUTPATIENT
Start: 2022-03-24

## 2022-03-23 RX ADMIN — ERTAPENEM SODIUM 1 G: 1 INJECTION, POWDER, LYOPHILIZED, FOR SOLUTION INTRAMUSCULAR; INTRAVENOUS at 09:55

## 2022-03-23 RX ADMIN — DAPTOMYCIN 500 MG: 500 INJECTION, POWDER, LYOPHILIZED, FOR SOLUTION INTRAVENOUS at 09:55

## 2022-03-24 ENCOUNTER — HOSPITAL ENCOUNTER (OUTPATIENT)
Dept: INFUSION THERAPY | Facility: HOSPITAL | Age: 67
Setting detail: INFUSION SERIES
Discharge: HOME | End: 2022-03-24

## 2022-03-24 VITALS
SYSTOLIC BLOOD PRESSURE: 107 MMHG | DIASTOLIC BLOOD PRESSURE: 77 MMHG | TEMPERATURE: 98 F | HEART RATE: 70 BPM | RESPIRATION RATE: 14 BRPM | OXYGEN SATURATION: 95 %

## 2022-03-24 DIAGNOSIS — M86.9 OSTEOMYELITIS OF FINGER OF LEFT HAND: Primary | ICD-10-CM

## 2022-03-24 PROCEDURE — 96365 THER/PROPH/DIAG IV INF INIT: CPT

## 2022-03-24 PROCEDURE — 25010000002 DAPTOMYCIN PER 1 MG: Performed by: INTERNAL MEDICINE

## 2022-03-24 PROCEDURE — 25010000002 ERTAPENEM PER 500 MG: Performed by: INTERNAL MEDICINE

## 2022-03-24 PROCEDURE — 96368 THER/DIAG CONCURRENT INF: CPT

## 2022-03-24 RX ORDER — SODIUM CHLORIDE 9 MG/ML
250 INJECTION, SOLUTION INTRAVENOUS ONCE
Status: CANCELLED | OUTPATIENT
Start: 2022-03-25

## 2022-03-24 RX ORDER — SODIUM CHLORIDE 9 MG/ML
250 INJECTION, SOLUTION INTRAVENOUS ONCE
Status: DISCONTINUED | OUTPATIENT
Start: 2022-03-24 | End: 2022-03-26 | Stop reason: HOSPADM

## 2022-03-24 RX ADMIN — ERTAPENEM SODIUM 1 G: 1 INJECTION, POWDER, LYOPHILIZED, FOR SOLUTION INTRAMUSCULAR; INTRAVENOUS at 10:32

## 2022-03-24 RX ADMIN — DAPTOMYCIN 500 MG: 500 INJECTION, POWDER, LYOPHILIZED, FOR SOLUTION INTRAVENOUS at 10:37

## 2022-03-25 ENCOUNTER — HOSPITAL ENCOUNTER (OUTPATIENT)
Dept: INFUSION THERAPY | Facility: HOSPITAL | Age: 67
Setting detail: INFUSION SERIES
Discharge: HOME | End: 2022-03-25

## 2022-03-25 VITALS
OXYGEN SATURATION: 96 % | RESPIRATION RATE: 18 BRPM | TEMPERATURE: 96.9 F | SYSTOLIC BLOOD PRESSURE: 115 MMHG | HEART RATE: 62 BPM | DIASTOLIC BLOOD PRESSURE: 69 MMHG

## 2022-03-25 DIAGNOSIS — M86.9 OSTEOMYELITIS OF FINGER OF LEFT HAND: Primary | ICD-10-CM

## 2022-03-25 PROCEDURE — 25010000002 ERTAPENEM PER 500 MG: Performed by: INTERNAL MEDICINE

## 2022-03-25 PROCEDURE — 96365 THER/PROPH/DIAG IV INF INIT: CPT

## 2022-03-25 PROCEDURE — 25010000002 DAPTOMYCIN PER 1 MG: Performed by: INTERNAL MEDICINE

## 2022-03-25 PROCEDURE — 96368 THER/DIAG CONCURRENT INF: CPT

## 2022-03-25 RX ORDER — SODIUM CHLORIDE 9 MG/ML
250 INJECTION, SOLUTION INTRAVENOUS ONCE
Status: CANCELLED | OUTPATIENT
Start: 2022-03-26

## 2022-03-25 RX ORDER — SODIUM CHLORIDE 9 MG/ML
250 INJECTION, SOLUTION INTRAVENOUS ONCE
Status: DISCONTINUED | OUTPATIENT
Start: 2022-03-25 | End: 2022-03-27 | Stop reason: HOSPADM

## 2022-03-25 RX ADMIN — ERTAPENEM SODIUM 1 G: 1 INJECTION, POWDER, LYOPHILIZED, FOR SOLUTION INTRAMUSCULAR; INTRAVENOUS at 10:00

## 2022-03-25 RX ADMIN — DAPTOMYCIN 500 MG: 500 INJECTION, POWDER, LYOPHILIZED, FOR SOLUTION INTRAVENOUS at 10:21

## 2022-03-26 ENCOUNTER — HOSPITAL ENCOUNTER (OUTPATIENT)
Dept: INFUSION THERAPY | Facility: HOSPITAL | Age: 67
Setting detail: INFUSION SERIES
Discharge: HOME | End: 2022-03-26

## 2022-03-26 VITALS
RESPIRATION RATE: 18 BRPM | DIASTOLIC BLOOD PRESSURE: 61 MMHG | SYSTOLIC BLOOD PRESSURE: 95 MMHG | HEART RATE: 78 BPM | TEMPERATURE: 98.4 F | OXYGEN SATURATION: 95 %

## 2022-03-26 DIAGNOSIS — M86.9 OSTEOMYELITIS OF FINGER OF LEFT HAND: Primary | ICD-10-CM

## 2022-03-26 PROCEDURE — 96368 THER/DIAG CONCURRENT INF: CPT

## 2022-03-26 PROCEDURE — 25010000002 ERTAPENEM PER 500 MG: Performed by: INTERNAL MEDICINE

## 2022-03-26 PROCEDURE — 96365 THER/PROPH/DIAG IV INF INIT: CPT

## 2022-03-26 PROCEDURE — 25010000002 DAPTOMYCIN PER 1 MG: Performed by: INTERNAL MEDICINE

## 2022-03-26 RX ORDER — SODIUM CHLORIDE 9 MG/ML
250 INJECTION, SOLUTION INTRAVENOUS ONCE
Status: DISCONTINUED | OUTPATIENT
Start: 2022-03-26 | End: 2022-03-28 | Stop reason: HOSPADM

## 2022-03-26 RX ORDER — SODIUM CHLORIDE 9 MG/ML
250 INJECTION, SOLUTION INTRAVENOUS ONCE
Status: CANCELLED | OUTPATIENT
Start: 2022-03-27

## 2022-03-26 RX ADMIN — ERTAPENEM SODIUM 1 G: 1 INJECTION, POWDER, LYOPHILIZED, FOR SOLUTION INTRAMUSCULAR; INTRAVENOUS at 08:53

## 2022-03-26 RX ADMIN — DAPTOMYCIN 500 MG: 500 INJECTION, POWDER, LYOPHILIZED, FOR SOLUTION INTRAVENOUS at 08:53

## 2022-03-27 ENCOUNTER — HOSPITAL ENCOUNTER (OUTPATIENT)
Dept: INFUSION THERAPY | Facility: HOSPITAL | Age: 67
Setting detail: INFUSION SERIES
Discharge: HOME | End: 2022-03-27

## 2022-03-27 VITALS
SYSTOLIC BLOOD PRESSURE: 112 MMHG | OXYGEN SATURATION: 99 % | DIASTOLIC BLOOD PRESSURE: 64 MMHG | TEMPERATURE: 98.4 F | HEART RATE: 80 BPM | RESPIRATION RATE: 20 BRPM

## 2022-03-27 DIAGNOSIS — M86.9 OSTEOMYELITIS OF FINGER OF LEFT HAND: Primary | ICD-10-CM

## 2022-03-27 PROCEDURE — 25010000002 DAPTOMYCIN PER 1 MG: Performed by: INTERNAL MEDICINE

## 2022-03-27 PROCEDURE — 96368 THER/DIAG CONCURRENT INF: CPT

## 2022-03-27 PROCEDURE — 96365 THER/PROPH/DIAG IV INF INIT: CPT

## 2022-03-27 PROCEDURE — 25010000002 ERTAPENEM PER 500 MG: Performed by: INTERNAL MEDICINE

## 2022-03-27 RX ORDER — SODIUM CHLORIDE 9 MG/ML
250 INJECTION, SOLUTION INTRAVENOUS ONCE
Status: CANCELLED | OUTPATIENT
Start: 2022-03-28

## 2022-03-27 RX ORDER — SODIUM CHLORIDE 9 MG/ML
250 INJECTION, SOLUTION INTRAVENOUS ONCE
Status: DISCONTINUED | OUTPATIENT
Start: 2022-03-27 | End: 2022-03-29 | Stop reason: HOSPADM

## 2022-03-27 RX ADMIN — ERTAPENEM SODIUM 1 G: 1 INJECTION, POWDER, LYOPHILIZED, FOR SOLUTION INTRAMUSCULAR; INTRAVENOUS at 08:50

## 2022-03-27 RX ADMIN — DAPTOMYCIN 500 MG: 500 INJECTION, POWDER, LYOPHILIZED, FOR SOLUTION INTRAVENOUS at 08:50

## 2022-03-28 ENCOUNTER — HOSPITAL ENCOUNTER (OUTPATIENT)
Dept: INFUSION THERAPY | Facility: HOSPITAL | Age: 67
Setting detail: INFUSION SERIES
Discharge: HOME | End: 2022-03-28

## 2022-03-28 DIAGNOSIS — M86.9 OSTEOMYELITIS OF FINGER OF LEFT HAND: Primary | ICD-10-CM

## 2022-03-28 LAB
ALBUMIN SERPL-MCNC: 4.1 G/DL (ref 3.5–5.2)
ALBUMIN/GLOB SERPL: 1.2 G/DL
ALP SERPL-CCNC: 95 U/L (ref 39–117)
ALT SERPL W P-5'-P-CCNC: 21 U/L (ref 1–33)
ANION GAP SERPL CALCULATED.3IONS-SCNC: 10.9 MMOL/L (ref 5–15)
AST SERPL-CCNC: 23 U/L (ref 1–32)
BASOPHILS # BLD AUTO: 0.05 10*3/MM3 (ref 0–0.2)
BASOPHILS NFR BLD AUTO: 1 % (ref 0–1.5)
BILIRUB SERPL-MCNC: 0.3 MG/DL (ref 0–1.2)
BUN SERPL-MCNC: 18 MG/DL (ref 8–23)
BUN/CREAT SERPL: 22.2 (ref 7–25)
CALCIUM SPEC-SCNC: 9.1 MG/DL (ref 8.6–10.5)
CHLORIDE SERPL-SCNC: 103 MMOL/L (ref 98–107)
CK SERPL-CCNC: 94 U/L (ref 20–180)
CO2 SERPL-SCNC: 23.1 MMOL/L (ref 22–29)
CREAT SERPL-MCNC: 0.81 MG/DL (ref 0.57–1)
CRP SERPL-MCNC: 0.6 MG/DL (ref 0–0.5)
DEPRECATED RDW RBC AUTO: 42.9 FL (ref 37–54)
EGFRCR SERPLBLD CKD-EPI 2021: 79.7 ML/MIN/1.73
EOSINOPHIL # BLD AUTO: 0.29 10*3/MM3 (ref 0–0.4)
EOSINOPHIL NFR BLD AUTO: 6.1 % (ref 0.3–6.2)
ERYTHROCYTE [DISTWIDTH] IN BLOOD BY AUTOMATED COUNT: 13.8 % (ref 12.3–15.4)
GLOBULIN UR ELPH-MCNC: 3.3 GM/DL
GLUCOSE SERPL-MCNC: 91 MG/DL (ref 65–99)
HCT VFR BLD AUTO: 36.8 % (ref 34–46.6)
HGB BLD-MCNC: 12.4 G/DL (ref 12–15.9)
IMM GRANULOCYTES # BLD AUTO: 0.01 10*3/MM3 (ref 0–0.05)
IMM GRANULOCYTES NFR BLD AUTO: 0.2 % (ref 0–0.5)
LYMPHOCYTES # BLD AUTO: 1.88 10*3/MM3 (ref 0.7–3.1)
LYMPHOCYTES NFR BLD AUTO: 39.4 % (ref 19.6–45.3)
MCH RBC QN AUTO: 28.8 PG (ref 26.6–33)
MCHC RBC AUTO-ENTMCNC: 33.7 G/DL (ref 31.5–35.7)
MCV RBC AUTO: 85.6 FL (ref 79–97)
MONOCYTES # BLD AUTO: 0.47 10*3/MM3 (ref 0.1–0.9)
MONOCYTES NFR BLD AUTO: 9.9 % (ref 5–12)
NEUTROPHILS NFR BLD AUTO: 2.07 10*3/MM3 (ref 1.7–7)
NEUTROPHILS NFR BLD AUTO: 43.4 % (ref 42.7–76)
NRBC BLD AUTO-RTO: 0 /100 WBC (ref 0–0.2)
PLATELET # BLD AUTO: 281 10*3/MM3 (ref 140–450)
PMV BLD AUTO: 10.3 FL (ref 6–12)
POTASSIUM SERPL-SCNC: 4.4 MMOL/L (ref 3.5–5.2)
PROT SERPL-MCNC: 7.4 G/DL (ref 6–8.5)
RBC # BLD AUTO: 4.3 10*6/MM3 (ref 3.77–5.28)
SODIUM SERPL-SCNC: 137 MMOL/L (ref 136–145)
WBC NRBC COR # BLD: 4.77 10*3/MM3 (ref 3.4–10.8)

## 2022-03-28 PROCEDURE — 25010000002 ERTAPENEM PER 500 MG: Performed by: INTERNAL MEDICINE

## 2022-03-28 PROCEDURE — 25010000002 DAPTOMYCIN PER 1 MG: Performed by: INTERNAL MEDICINE

## 2022-03-28 PROCEDURE — 85025 COMPLETE CBC W/AUTO DIFF WBC: CPT | Performed by: INTERNAL MEDICINE

## 2022-03-28 PROCEDURE — 86140 C-REACTIVE PROTEIN: CPT | Performed by: INTERNAL MEDICINE

## 2022-03-28 PROCEDURE — 96368 THER/DIAG CONCURRENT INF: CPT

## 2022-03-28 PROCEDURE — 82550 ASSAY OF CK (CPK): CPT | Performed by: INTERNAL MEDICINE

## 2022-03-28 PROCEDURE — 80053 COMPREHEN METABOLIC PANEL: CPT | Performed by: INTERNAL MEDICINE

## 2022-03-28 RX ORDER — SODIUM CHLORIDE 9 MG/ML
250 INJECTION, SOLUTION INTRAVENOUS ONCE
Status: CANCELLED | OUTPATIENT
Start: 2022-03-29

## 2022-03-28 RX ORDER — SODIUM CHLORIDE 9 MG/ML
250 INJECTION, SOLUTION INTRAVENOUS ONCE
Status: DISCONTINUED | OUTPATIENT
Start: 2022-03-28 | End: 2022-03-30 | Stop reason: HOSPADM

## 2022-03-28 RX ADMIN — ERTAPENEM SODIUM 1 G: 1 INJECTION, POWDER, LYOPHILIZED, FOR SOLUTION INTRAMUSCULAR; INTRAVENOUS at 10:29

## 2022-03-28 RX ADMIN — DAPTOMYCIN 500 MG: 500 INJECTION, POWDER, LYOPHILIZED, FOR SOLUTION INTRAVENOUS at 10:29

## 2022-03-29 ENCOUNTER — HOSPITAL ENCOUNTER (OUTPATIENT)
Dept: INFUSION THERAPY | Facility: HOSPITAL | Age: 67
Setting detail: INFUSION SERIES
Discharge: HOME | End: 2022-03-29

## 2022-03-29 VITALS
DIASTOLIC BLOOD PRESSURE: 78 MMHG | OXYGEN SATURATION: 96 % | TEMPERATURE: 98.4 F | SYSTOLIC BLOOD PRESSURE: 131 MMHG | RESPIRATION RATE: 18 BRPM | HEART RATE: 64 BPM

## 2022-03-29 DIAGNOSIS — M86.9 OSTEOMYELITIS OF FINGER OF LEFT HAND: Primary | ICD-10-CM

## 2022-03-29 PROCEDURE — 96368 THER/DIAG CONCURRENT INF: CPT

## 2022-03-29 PROCEDURE — 25010000002 ERTAPENEM PER 500 MG: Performed by: INTERNAL MEDICINE

## 2022-03-29 PROCEDURE — 96365 THER/PROPH/DIAG IV INF INIT: CPT

## 2022-03-29 PROCEDURE — 25010000002 DAPTOMYCIN PER 1 MG: Performed by: INTERNAL MEDICINE

## 2022-03-29 RX ORDER — SODIUM CHLORIDE 9 MG/ML
250 INJECTION, SOLUTION INTRAVENOUS ONCE
Status: DISCONTINUED | OUTPATIENT
Start: 2022-03-29 | End: 2022-03-31 | Stop reason: HOSPADM

## 2022-03-29 RX ORDER — SODIUM CHLORIDE 9 MG/ML
250 INJECTION, SOLUTION INTRAVENOUS ONCE
Status: CANCELLED | OUTPATIENT
Start: 2022-03-30

## 2022-03-29 RX ADMIN — ERTAPENEM SODIUM 1 G: 1 INJECTION, POWDER, LYOPHILIZED, FOR SOLUTION INTRAMUSCULAR; INTRAVENOUS at 09:57

## 2022-03-29 RX ADMIN — DAPTOMYCIN 500 MG: 500 INJECTION, POWDER, LYOPHILIZED, FOR SOLUTION INTRAVENOUS at 09:57

## 2022-03-30 ENCOUNTER — HOSPITAL ENCOUNTER (OUTPATIENT)
Dept: INFUSION THERAPY | Facility: HOSPITAL | Age: 67
Setting detail: INFUSION SERIES
Discharge: HOME | End: 2022-03-30

## 2022-03-30 VITALS
DIASTOLIC BLOOD PRESSURE: 83 MMHG | TEMPERATURE: 97.7 F | HEART RATE: 59 BPM | SYSTOLIC BLOOD PRESSURE: 137 MMHG | OXYGEN SATURATION: 99 % | RESPIRATION RATE: 18 BRPM

## 2022-03-30 DIAGNOSIS — M86.9 OSTEOMYELITIS OF FINGER OF LEFT HAND: Primary | ICD-10-CM

## 2022-03-30 PROCEDURE — 25010000002 DAPTOMYCIN PER 1 MG: Performed by: INTERNAL MEDICINE

## 2022-03-30 PROCEDURE — 96365 THER/PROPH/DIAG IV INF INIT: CPT

## 2022-03-30 PROCEDURE — 25010000002 ERTAPENEM PER 500 MG: Performed by: INTERNAL MEDICINE

## 2022-03-30 PROCEDURE — 96368 THER/DIAG CONCURRENT INF: CPT

## 2022-03-30 RX ORDER — SODIUM CHLORIDE 9 MG/ML
250 INJECTION, SOLUTION INTRAVENOUS ONCE
OUTPATIENT
Start: 2022-03-31

## 2022-03-30 RX ORDER — SODIUM CHLORIDE 9 MG/ML
250 INJECTION, SOLUTION INTRAVENOUS ONCE
Status: DISCONTINUED | OUTPATIENT
Start: 2022-03-30 | End: 2022-04-01 | Stop reason: HOSPADM

## 2022-03-30 RX ADMIN — ERTAPENEM SODIUM 1 G: 1 INJECTION, POWDER, LYOPHILIZED, FOR SOLUTION INTRAMUSCULAR; INTRAVENOUS at 09:56

## 2022-03-30 RX ADMIN — DAPTOMYCIN 500 MG: 500 INJECTION, POWDER, LYOPHILIZED, FOR SOLUTION INTRAVENOUS at 09:56

## 2022-04-18 DIAGNOSIS — K21.9 GASTROESOPHAGEAL REFLUX DISEASE, UNSPECIFIED WHETHER ESOPHAGITIS PRESENT: ICD-10-CM

## 2022-04-18 DIAGNOSIS — R13.19 ESOPHAGEAL DYSPHAGIA: ICD-10-CM

## 2022-04-18 RX ORDER — OMEPRAZOLE 40 MG/1
CAPSULE, DELAYED RELEASE ORAL
Qty: 30 CAPSULE | Refills: 0 | Status: SHIPPED | OUTPATIENT
Start: 2022-04-18 | End: 2022-05-16

## 2022-04-21 DIAGNOSIS — Z01.812 PRE-PROCEDURE LAB EXAM: Primary | ICD-10-CM

## 2022-04-22 ENCOUNTER — LAB (OUTPATIENT)
Dept: LAB | Facility: HOSPITAL | Age: 67
End: 2022-04-22

## 2022-04-22 DIAGNOSIS — Z01.812 PRE-PROCEDURE LAB EXAM: ICD-10-CM

## 2022-04-22 LAB — SARS-COV-2 RNA NOSE QL NAA+PROBE: NOT DETECTED

## 2022-04-22 PROCEDURE — C9803 HOPD COVID-19 SPEC COLLECT: HCPCS

## 2022-04-22 PROCEDURE — U0005 INFEC AGEN DETEC AMPLI PROBE: HCPCS

## 2022-04-22 PROCEDURE — U0004 COV-19 TEST NON-CDC HGH THRU: HCPCS

## 2022-04-22 RX ORDER — DIPHENOXYLATE HYDROCHLORIDE AND ATROPINE SULFATE 2.5; .025 MG/1; MG/1
1 TABLET ORAL DAILY
COMMUNITY
End: 2023-02-23

## 2022-04-24 ENCOUNTER — ANESTHESIA EVENT (OUTPATIENT)
Dept: GASTROENTEROLOGY | Facility: HOSPITAL | Age: 67
End: 2022-04-24

## 2022-04-24 NOTE — ANESTHESIA PREPROCEDURE EVALUATION
Anesthesia Evaluation     Patient summary reviewed and Nursing notes reviewed   history of anesthetic complications: PONV  NPO Solid Status: > 8 hours  NPO Liquid Status: > 8 hours           Airway   Mallampati: II  TM distance: >3 FB  Neck ROM: full  Possible difficult intubation  Dental - normal exam     Pulmonary    (+) a smoker Former, sleep apnea, decreased breath sounds,     ROS comment: Former 10 pack year smoker - QUIT 2016  Cardiovascular - normal exam  Exercise tolerance: good (4-7 METS)    (+) hypertension well controlled less than 2 medications, past MI , CAD, CABG, angina with exertion, PVD, hyperlipidemia,     ROS comment: NO ECG for review    Neuro/Psych  (+) psychiatric history Anxiety and Depression,    GI/Hepatic/Renal/Endo    (+) obesity,  hiatal hernia, GERD, GI bleeding lower ,     Musculoskeletal     Abdominal  - normal exam   Substance History   (+) alcohol use,   (-) drug use     OB/GYN negative ob/gyn ROS   (-)  Pregnant    Comment: Post menopause      Other   arthritis,      ROS/Med Hx Other: Epigastric pain  Colon cancer screening  Arthritis  Anxiety  Depression  Hyperlipidemia  Urinary incontinence  Vitamin D deficiency, unspecified   Gastroesophageal reflux disease without esophagitis  Dysphagia  Melena  Nausea  Heartburn  Diarrhea  BRBPR (bright red blood per rectum)  Left lower quadrant abdominal tenderness without rebound tenderness  Adenomatous polyp of colon                        Anesthesia Plan    ASA 3     MAC   (Patient advised that intravenous sedation would be utilized as primary anesthetic technique. Every effort will be made to make sure patient is comfortable. Patient advised that they may experience recall of events of the procedure. Patient verbalized understanding and agreed to plan. )  intravenous induction     Anesthetic plan, all risks, benefits, and alternatives have been provided, discussed and informed consent has been obtained with: patient.    Plan discussed  with CRNA.

## 2022-04-25 ENCOUNTER — ANESTHESIA (OUTPATIENT)
Dept: GASTROENTEROLOGY | Facility: HOSPITAL | Age: 67
End: 2022-04-25

## 2022-04-25 ENCOUNTER — HOSPITAL ENCOUNTER (OUTPATIENT)
Facility: HOSPITAL | Age: 67
Setting detail: HOSPITAL OUTPATIENT SURGERY
Discharge: HOME | End: 2022-04-25
Attending: INTERNAL MEDICINE | Admitting: INTERNAL MEDICINE

## 2022-04-25 VITALS
WEIGHT: 162 LBS | OXYGEN SATURATION: 96 % | BODY MASS INDEX: 29.81 KG/M2 | TEMPERATURE: 97.5 F | DIASTOLIC BLOOD PRESSURE: 75 MMHG | RESPIRATION RATE: 20 BRPM | HEIGHT: 62 IN | SYSTOLIC BLOOD PRESSURE: 111 MMHG | HEART RATE: 62 BPM

## 2022-04-25 DIAGNOSIS — R13.10 DYSPHAGIA, UNSPECIFIED TYPE: Primary | ICD-10-CM

## 2022-04-25 DIAGNOSIS — R13.19 ESOPHAGEAL DYSPHAGIA: ICD-10-CM

## 2022-04-25 DIAGNOSIS — R10.11 RUQ ABDOMINAL PAIN: ICD-10-CM

## 2022-04-25 DIAGNOSIS — R13.19 ESOPHAGEAL DYSPHAGIA: Primary | ICD-10-CM

## 2022-04-25 PROCEDURE — 45380 COLONOSCOPY AND BIOPSY: CPT | Performed by: INTERNAL MEDICINE

## 2022-04-25 PROCEDURE — 0 MEPERIDINE PER 100 MG: Performed by: NURSE ANESTHETIST, CERTIFIED REGISTERED

## 2022-04-25 PROCEDURE — 88342 IMHCHEM/IMCYTCHM 1ST ANTB: CPT | Performed by: INTERNAL MEDICINE

## 2022-04-25 PROCEDURE — 88305 TISSUE EXAM BY PATHOLOGIST: CPT | Performed by: INTERNAL MEDICINE

## 2022-04-25 PROCEDURE — 43239 EGD BIOPSY SINGLE/MULTIPLE: CPT | Performed by: INTERNAL MEDICINE

## 2022-04-25 PROCEDURE — 45385 COLONOSCOPY W/LESION REMOVAL: CPT | Performed by: INTERNAL MEDICINE

## 2022-04-25 PROCEDURE — 25010000002 PROPOFOL 10 MG/ML EMULSION: Performed by: NURSE ANESTHETIST, CERTIFIED REGISTERED

## 2022-04-25 PROCEDURE — 25010000002 MIDAZOLAM PER 1MG: Performed by: NURSE ANESTHETIST, CERTIFIED REGISTERED

## 2022-04-25 RX ORDER — LIDOCAINE HYDROCHLORIDE 20 MG/ML
INJECTION, SOLUTION INTRAVENOUS AS NEEDED
Status: DISCONTINUED | OUTPATIENT
Start: 2022-04-25 | End: 2022-04-25 | Stop reason: SURG

## 2022-04-25 RX ORDER — SODIUM CHLORIDE 9 MG/ML
INJECTION, SOLUTION INTRAVENOUS CONTINUOUS PRN
Status: DISCONTINUED | OUTPATIENT
Start: 2022-04-25 | End: 2022-04-25 | Stop reason: SURG

## 2022-04-25 RX ORDER — MEPERIDINE HYDROCHLORIDE 50 MG/ML
INJECTION INTRAMUSCULAR; INTRAVENOUS; SUBCUTANEOUS AS NEEDED
Status: DISCONTINUED | OUTPATIENT
Start: 2022-04-25 | End: 2022-04-25 | Stop reason: SURG

## 2022-04-25 RX ORDER — KETAMINE HCL IN NACL, ISO-OSM 100MG/10ML
SYRINGE (ML) INJECTION AS NEEDED
Status: DISCONTINUED | OUTPATIENT
Start: 2022-04-25 | End: 2022-04-25 | Stop reason: SURG

## 2022-04-25 RX ORDER — MIDAZOLAM HYDROCHLORIDE 2 MG/2ML
INJECTION, SOLUTION INTRAMUSCULAR; INTRAVENOUS AS NEEDED
Status: DISCONTINUED | OUTPATIENT
Start: 2022-04-25 | End: 2022-04-25 | Stop reason: SURG

## 2022-04-25 RX ORDER — PROPOFOL 10 MG/ML
VIAL (ML) INTRAVENOUS AS NEEDED
Status: DISCONTINUED | OUTPATIENT
Start: 2022-04-25 | End: 2022-04-25 | Stop reason: SURG

## 2022-04-25 RX ORDER — SODIUM CHLORIDE 9 MG/ML
30 INJECTION, SOLUTION INTRAVENOUS CONTINUOUS PRN
Status: DISCONTINUED | OUTPATIENT
Start: 2022-04-25 | End: 2022-04-25 | Stop reason: HOSPADM

## 2022-04-25 RX ADMIN — SODIUM CHLORIDE 30 ML/HR: 9 INJECTION, SOLUTION INTRAVENOUS at 06:43

## 2022-04-25 RX ADMIN — Medication 20 MG: at 07:42

## 2022-04-25 RX ADMIN — SODIUM CHLORIDE: 9 INJECTION, SOLUTION INTRAVENOUS at 07:14

## 2022-04-25 RX ADMIN — MEPERIDINE HYDROCHLORIDE 25 MG: 50 INJECTION, SOLUTION INTRAMUSCULAR; INTRAVENOUS; SUBCUTANEOUS at 07:38

## 2022-04-25 RX ADMIN — MIDAZOLAM HYDROCHLORIDE 1 MG: 1 INJECTION, SOLUTION INTRAMUSCULAR; INTRAVENOUS at 07:41

## 2022-04-25 RX ADMIN — MIDAZOLAM HYDROCHLORIDE 1 MG: 1 INJECTION, SOLUTION INTRAMUSCULAR; INTRAVENOUS at 07:31

## 2022-04-25 RX ADMIN — PROPOFOL 30 MG: 10 INJECTION, EMULSION INTRAVENOUS at 07:43

## 2022-04-25 RX ADMIN — LIDOCAINE HYDROCHLORIDE 40 MG: 20 INJECTION, SOLUTION INTRAVENOUS at 07:42

## 2022-04-25 NOTE — ANESTHESIA POSTPROCEDURE EVALUATION
Patient: Krystina Henley    Procedure Summary     Date: 04/25/22 Room / Location: [facility] ENDOSCOPY 1 / [facility] ENDOSCOPY    Anesthesia Start: 0729 Anesthesia Stop:     Procedure: ESOPHAGOGASTRODUODENOSCOPY WITH BIOPSY (N/A ) Diagnosis:       RUQ abdominal pain      Esophageal dysphagia      (RUQ abdominal pain [R10.11])      (Esophageal dysphagia [R13.19])    Surgeons: Juan Coreas MD Provider: Jorge Alberto Toussaint CRNA    Anesthesia Type: MAC ASA Status: 3          Anesthesia Type: MAC    Vitals  No vitals data found for the desired time range.          Post Anesthesia Care and Evaluation    Patient location during evaluation: PHASE II  Patient participation: complete - patient participated  Level of consciousness: awake  Pain score: 0  Pain management: adequate  Airway patency: patent  Anesthetic complications: No anesthetic complications  PONV Status: none  Cardiovascular status: acceptable  Respiratory status: acceptable and nasal cannula  Hydration status: acceptable    Comments: vsss resp spont, reflexes intact, responsive, report given to pacu nurse.  See rn note for postop vs

## 2022-04-28 LAB
LAB AP CASE REPORT: NORMAL
PATH REPORT.FINAL DX SPEC: NORMAL

## 2022-05-03 ENCOUNTER — LAB (OUTPATIENT)
Dept: LAB | Facility: HOSPITAL | Age: 67
End: 2022-05-03

## 2022-05-03 DIAGNOSIS — R13.19 ESOPHAGEAL DYSPHAGIA: ICD-10-CM

## 2022-05-03 LAB — SARS-COV-2 RNA PNL SPEC NAA+PROBE: NOT DETECTED

## 2022-05-03 PROCEDURE — U0004 COV-19 TEST NON-CDC HGH THRU: HCPCS

## 2022-05-03 PROCEDURE — C9803 HOPD COVID-19 SPEC COLLECT: HCPCS

## 2022-05-03 PROCEDURE — U0005 INFEC AGEN DETEC AMPLI PROBE: HCPCS

## 2022-05-05 ENCOUNTER — HOSPITAL ENCOUNTER (OUTPATIENT)
Dept: GENERAL RADIOLOGY | Facility: HOSPITAL | Age: 67
Discharge: HOME | End: 2022-05-05
Admitting: INTERNAL MEDICINE

## 2022-05-05 DIAGNOSIS — R13.10 DYSPHAGIA, UNSPECIFIED TYPE: ICD-10-CM

## 2022-05-05 PROCEDURE — 74220 X-RAY XM ESOPHAGUS 1CNTRST: CPT

## 2022-05-15 DIAGNOSIS — R13.19 ESOPHAGEAL DYSPHAGIA: ICD-10-CM

## 2022-05-15 DIAGNOSIS — K21.9 GASTROESOPHAGEAL REFLUX DISEASE, UNSPECIFIED WHETHER ESOPHAGITIS PRESENT: ICD-10-CM

## 2022-05-16 RX ORDER — OMEPRAZOLE 40 MG/1
CAPSULE, DELAYED RELEASE ORAL
Qty: 30 CAPSULE | Refills: 0 | Status: SHIPPED | OUTPATIENT
Start: 2022-05-16 | End: 2022-06-14

## 2022-06-09 ENCOUNTER — TELEPHONE (OUTPATIENT)
Dept: INTERNAL MEDICINE | Facility: CLINIC | Age: 67
End: 2022-06-09

## 2022-06-09 NOTE — TELEPHONE ENCOUNTER
Patient did not complete DEXA ordered on 05/24/2021. This order is too old to be used and has been cancelled.

## 2022-06-13 DIAGNOSIS — R13.19 ESOPHAGEAL DYSPHAGIA: ICD-10-CM

## 2022-06-13 DIAGNOSIS — K21.9 GASTROESOPHAGEAL REFLUX DISEASE, UNSPECIFIED WHETHER ESOPHAGITIS PRESENT: ICD-10-CM

## 2022-06-14 RX ORDER — OMEPRAZOLE 40 MG/1
CAPSULE, DELAYED RELEASE ORAL
Qty: 30 CAPSULE | Refills: 5 | Status: SHIPPED | OUTPATIENT
Start: 2022-06-14 | End: 2022-12-02

## 2022-06-20 ENCOUNTER — OFFICE VISIT (OUTPATIENT)
Dept: GASTROENTEROLOGY | Facility: CLINIC | Age: 67
End: 2022-06-20

## 2022-06-20 VITALS
SYSTOLIC BLOOD PRESSURE: 108 MMHG | WEIGHT: 162 LBS | BODY MASS INDEX: 29.81 KG/M2 | DIASTOLIC BLOOD PRESSURE: 70 MMHG | TEMPERATURE: 97.5 F | HEIGHT: 62 IN

## 2022-06-20 DIAGNOSIS — K44.9 HIATAL HERNIA: ICD-10-CM

## 2022-06-20 DIAGNOSIS — K82.4 GALLBLADDER POLYP: ICD-10-CM

## 2022-06-20 DIAGNOSIS — K21.00 GASTROESOPHAGEAL REFLUX DISEASE WITH ESOPHAGITIS WITHOUT HEMORRHAGE: ICD-10-CM

## 2022-06-20 DIAGNOSIS — R11.0 NAUSEA: ICD-10-CM

## 2022-06-20 DIAGNOSIS — R13.19 ESOPHAGEAL DYSPHAGIA: Primary | ICD-10-CM

## 2022-06-20 DIAGNOSIS — R10.11 RUQ ABDOMINAL PAIN: ICD-10-CM

## 2022-06-20 PROCEDURE — 99214 OFFICE O/P EST MOD 30 MIN: CPT | Performed by: PHYSICIAN ASSISTANT

## 2022-06-20 NOTE — PROGRESS NOTES
Follow Up Note     Date: 2022   Patient Name: Krystina Henley  MRN: [id number]  : 1955     Primary Care Provider: Tre Lindquist MD     Chief Complaint   Patient presents with   • Results     EGD   • Difficulty Swallowing   • Abdominal Pain     History of present illness:   2022  Krystina Henley is a 66 y.o. female who is here today for follow up regarding Results (EGD), Difficulty Swallowing, and Abdominal Pain.    Still with RUQ abdominal but improved some since procedure. She has been modifying her diet and avoiding foods that make her reflux better and feels that this has helped. Has been taking omeprazole 40 mg once daily and pepcid 40 mg PRN heartburn. Takes zofran with relief of intermittent nausea without vomiting. BMs are daily and formed, denies any constipation. She had EGD recently and would like to discuss those results.     Interval History:  3/17/2022  She had colonoscopy recently and would like to discuss those results.  Still with the same upper GI complaints as previous, EGD has been arranged. Still with RUQ abdominal pain. Has history of large (30 mm) polyp on previous colonoscopy.     2020  The patient has a history of reflux off and on for the last several years.  The reflux is rather severe.  Symptoms are described as retrosternal burning sensation, and indigestion.  There is history of occasional regurgitative symptoms.  Frequency being several times per week.  The symptoms are worse at night.  The patient takes acid suppressive therapy with significant breakthrough symptoms.  The patient denies significant consumption of soda beverages, coffee or tea.  She denies eating chocolates.  The patient is a non-smoker.  She however drinks wine perhaps 2 drinks a week.  There is history of some recent weight gain of about 5 pounds over the last 1 month or so.  Her swallowing has significantly improved.     The patient has history of diarrhea off-and-on for the  "last several months.  Diarrhea is episodic and occurs perhaps couple of times a week.  Severity is mild, frequency of bowel movements being 2-3 times a day.  The stools are described as loose.  Occasionally, there is nocturnal element of diarrhea. The diarrhea is not associated with tenesmus.  The patient has been having recurrent bright red blood per rectum.  She has also noticed passage of \"clots\".  There is no associated dizziness.  There is some soreness at the rectal area.  There is no history of hematemesis or melena.  Her previous colonoscopy was in 2014.     There is no history of liver or pancreatic disease.  There is no family history of colon cancer, inflammatory bowel disease, celiac disease or chronic liver disease.    Subjective     Past Medical History:   Diagnosis Date   • Abnormal ECG Ukn   • Anemia    • Anxiety    • Colon polyp 01/16/2014   • Coronary artery disease 04/2021   • Depression    • Diverticulosis    • Dog bite     arms, face, legs   • Dog bite    • Ear piercing    • Elevated cholesterol    • Essential hypertension     patient states she does not have hypertension   • Fibroid     uterine   • GERD (gastroesophageal reflux disease)    • Heartburn    • Hiatal hernia    • History of nuclear stress test 03/05/2021   • History of transfusion     no adverse reactions   • Hx of echocardiogram 03/18/2021   • Hyperlipidemia    • Myocardial infarction (HCC)    • Osteoarthritis     generalized   • Osteomyelitis (HCC)    • PONV (postoperative nausea and vomiting)    • Seasonal allergies    • Tinnitus    • Wears glasses     reading only     Past Surgical History:   Procedure Laterality Date   • BLADDER SLING MODIFIED, ANTERIOR AND POSTERIOR VAGINAL REPAIR     • BREAST BIOPSY     • CARDIAC CATHETERIZATION N/A 03/18/2021    Procedure: Left Heart Cath;  Surgeon: Ernesto Corona III, MD;  Location: [facility] CATH INVASIVE LOCATION;  Service: Cardiovascular;  Laterality: N/A;   • CARDIAC CATHETERIZATION N/A " 03/18/2021    Procedure: Percutaneous Coronary Intervention;  Surgeon: Fredrick Juares MD;  Location: [facility] INVASIVE LOCATION;  Service: Cardiovascular;  Laterality: N/A;   • COLONOSCOPY  01/16/2014   • COLONOSCOPY N/A 07/31/2020    Procedure: COLONOSCOPY WITH BIOPSY AND HOT SNARE POLYPECTOMY;  Surgeon: Juan Coreas MD;  Location: [facility] ENDOSCOPY;  Service: Gastroenterology;  Laterality: N/A;   • COLONOSCOPY N/A 01/25/2021    Procedure: COLONOSCOPY WITH COLD BIOPSY POLYPECTOMY;  Surgeon: Juan Coreas MD;  Location: [facility] ENDOSCOPY;  Service: Gastroenterology;  Laterality: N/A;   • COLONOSCOPY N/A 01/21/2022    Procedure: COLONOSCOPY ;  Surgeon: Juan Coreas MD;  Location: [facility] ENDOSCOPY;  Service: Gastroenterology;  Laterality: N/A;   • CORONARY ARTERY BYPASS GRAFT  04/2021   • ENDOSCOPY N/A 02/19/2020    Procedure: ESOPHAGOGASTRODUODENOSCOPY;  Surgeon: Marito Abdi MD;  Location: [facility] ENDOSCOPY;  Service: Gastroenterology;  Laterality: N/A;   • ENDOSCOPY N/A 06/03/2020    Procedure: ESOPHAGOGASTRODUODENOSCOPY;  Surgeon: Marito Abdi MD;  Location: [facility] ENDOSCOPY;  Service: Gastroenterology;  Laterality: N/A;   • ENDOSCOPY N/A 4/25/2022    Procedure: ESOPHAGOGASTRODUODENOSCOPY WITH BIOPSY;  Surgeon: Juan Coreas MD;  Location: [facility] ENDOSCOPY;  Service: Gastroenterology;  Laterality: N/A;   • FINGER SURGERY Left     Index finger   • HYSTERECTOMY      still has one ovary on left side   • UPPER GASTROINTESTINAL ENDOSCOPY  03/13/2014   • VAGINAL DELIVERY      x3   • WISDOM TOOTH EXTRACTION       Family History   Problem Relation Age of Onset   • Lung cancer Mother    • Lung cancer Father    • Prostate cancer Brother         x 3 brothers   • Heart attack Brother         x 2 brothers    • Heart disease Brother    • Colon cancer Neg Hx      Social History     Socioeconomic History   • Marital status:    Tobacco Use   • Smoking status: Former Smoker      Packs/day: 1.00     Years: 10.00     Pack years: 10.00     Types: Cigarettes, Cigarettes     Quit date: 2016     Years since quittin.9   • Smokeless tobacco: Never Used   • Tobacco comment: Intermittent   Vaping Use   • Vaping Use: Never used   Substance and Sexual Activity   • Alcohol use: Yes     Alcohol/week: 3.0 standard drinks     Types: 3 Glasses of wine per week     Comment: social   • Drug use: No   • Sexual activity: Not Currently     Partners: Male     Birth control/protection: Post-menopausal     Current Outpatient Medications:   •  aspirin (aspirin) 81 MG EC tablet, Take 1 tablet by mouth Daily., Disp: 30 tablet, Rfl: 1  •  famotidine (PEPCID) 40 MG tablet, Take 1 tablet by mouth At Night As Needed for Heartburn., Disp: 30 tablet, Rfl: 2  •  multivitamin (THERAGRAN) tablet tablet, Take  by mouth Daily., Disp: , Rfl:   •  omeprazole (priLOSEC) 40 MG capsule, TAKE 1 CAPSULE BY MOUTH EVERY DAY 30 minutes prior to breakfast, Disp: 30 capsule, Rfl: 5  •  ondansetron ODT (ZOFRAN-ODT) 4 MG disintegrating tablet, Place 1 tablet on the tongue 4 (Four) Times a Day As Needed for Nausea., Disp: 20 tablet, Rfl: 0  •  nitroglycerin (Nitrostat) 0.4 MG SL tablet, Place 1 tablet under the tongue Every 5 (Five) Minutes As Needed for Chest Pain. Take no more than 3 doses in 15 minutes. (Patient not taking: No sig reported), Disp: 12 tablet, Rfl: 12    No Known Allergies    The following portions of the patient's history were reviewed and updated as appropriate: allergies, current medications, past family history, past medical history, past social history, past surgical history and problem list.    Objective     Physical Exam  Constitutional:       General: She is not in acute distress.     Appearance: Normal appearance. She is well-developed. She is not diaphoretic.   HENT:      Head: Normocephalic and atraumatic.      Right Ear: External ear normal.      Left Ear: External ear normal.      Nose: Nose normal.       "Mouth/Throat:      Comments: Wearing a mask  Eyes:      General: No scleral icterus.        Right eye: No discharge.         Left eye: No discharge.      Conjunctiva/sclera: Conjunctivae normal.   Neck:      Trachea: No tracheal deviation.   Pulmonary:      Effort: Pulmonary effort is normal. No respiratory distress.   Abdominal:      General: Bowel sounds are normal. There is no distension.      Palpations: Abdomen is soft. There is no mass.      Tenderness: There is no abdominal tenderness. There is no guarding or rebound.      Hernia: No hernia is present.   Musculoskeletal:         General: Normal range of motion.      Cervical back: Normal range of motion.   Skin:     Coloration: Skin is not pale.      Findings: No erythema or rash.   Neurological:      Mental Status: She is alert and oriented to person, place, and time.      Coordination: Coordination normal.   Psychiatric:         Mood and Affect: Mood normal.         Behavior: Behavior normal.         Thought Content: Thought content normal.         Judgment: Judgment normal.       Vitals:    06/20/22 1019   BP: 108/70   Temp: 97.5 °F (36.4 °C)   Weight: 73.5 kg (162 lb)   Height: 157.5 cm (62\")     Results Review:   I reviewed the patient's new clinical results.    Hospital Outpatient Visit on 03/28/2022   Component Date Value Ref Range Status   • Glucose 03/28/2022 91  65 - 99 mg/dL Final   • BUN 03/28/2022 18  8 - 23 mg/dL Final   • Creatinine 03/28/2022 0.81  0.57 - 1.00 mg/dL Final   • Sodium 03/28/2022 137  136 - 145 mmol/L Final   • Potassium 03/28/2022 4.4  3.5 - 5.2 mmol/L Final   • Chloride 03/28/2022 103  98 - 107 mmol/L Final   • CO2 03/28/2022 23.1  22.0 - 29.0 mmol/L Final   • Calcium 03/28/2022 9.1  8.6 - 10.5 mg/dL Final   • Total Protein 03/28/2022 7.4  6.0 - 8.5 g/dL Final   • Albumin 03/28/2022 4.10  3.50 - 5.20 g/dL Final   • ALT (SGPT) 03/28/2022 21  1 - 33 U/L Final   • AST (SGOT) 03/28/2022 23  1 - 32 U/L Final   • Alkaline Phosphatase " 03/28/2022 95  39 - 117 U/L Final   • Total Bilirubin 03/28/2022 0.3  0.0 - 1.2 mg/dL Final   • Globulin 03/28/2022 3.3  gm/dL Final   • A/G Ratio 03/28/2022 1.2  g/dL Final   • BUN/Creatinine Ratio 03/28/2022 22.2  7.0 - 25.0 Final   • Anion Gap 03/28/2022 10.9  5.0 - 15.0 mmol/L Final   • eGFR 03/28/2022 79.7  >60.0 mL/min/1.73 Final    National Kidney Foundation and American Society of Nephrology (ASN) Task Force recommended calculation based on the Chronic Kidney Disease Epidemiology Collaboration (CKD-EPI) equation refit without adjustment for race.   • C-Reactive Protein 03/28/2022 0.60 (A) 0.00 - 0.50 mg/dL Final   • Creatine Kinase 03/28/2022 94  20 - 180 U/L Final   • WBC 03/28/2022 4.77  3.40 - 10.80 10*3/mm3 Final   • RBC 03/28/2022 4.30  3.77 - 5.28 10*6/mm3 Final   • Hemoglobin 03/28/2022 12.4  12.0 - 15.9 g/dL Final   • Hematocrit 03/28/2022 36.8  34.0 - 46.6 % Final   • MCV 03/28/2022 85.6  79.0 - 97.0 fL Final   • MCH 03/28/2022 28.8  26.6 - 33.0 pg Final   • MCHC 03/28/2022 33.7  31.5 - 35.7 g/dL Final   • RDW 03/28/2022 13.8  12.3 - 15.4 % Final   • RDW-SD 03/28/2022 42.9  37.0 - 54.0 fl Final   • MPV 03/28/2022 10.3  6.0 - 12.0 fL Final   • Platelets 03/28/2022 281  140 - 450 10*3/mm3 Final   • Neutrophil % 03/28/2022 43.4  42.7 - 76.0 % Final   • Lymphocyte % 03/28/2022 39.4  19.6 - 45.3 % Final   • Monocyte % 03/28/2022 9.9  5.0 - 12.0 % Final   • Eosinophil % 03/28/2022 6.1  0.3 - 6.2 % Final   • Basophil % 03/28/2022 1.0  0.0 - 1.5 % Final   • Immature Grans % 03/28/2022 0.2  0.0 - 0.5 % Final   • Neutrophils, Absolute 03/28/2022 2.07  1.70 - 7.00 10*3/mm3 Final   • Lymphocytes, Absolute 03/28/2022 1.88  0.70 - 3.10 10*3/mm3 Final   • Monocytes, Absolute 03/28/2022 0.47  0.10 - 0.90 10*3/mm3 Final   • Eosinophils, Absolute 03/28/2022 0.29  0.00 - 0.40 10*3/mm3 Final   • Basophils, Absolute 03/28/2022 0.05  0.00 - 0.20 10*3/mm3 Final   • Immature Grans, Absolute 03/28/2022 0.01  0.00 - 0.05  10*3/mm3 Final   • nRBC 03/28/2022 0.0  0.0 - 0.2 /100 WBC Final     Labs 3/2021  - Hepatitis A Antibody, Total; negative  - Hepatitis B Surf Antibody; positive  - Hepatitis B Surface Antigen; negative  - Hepatitis B Core Antibody, Total; negative     Dated Yari 3, 2020 the patient underwent an upper endoscopy which revealed: Erosive distal esophagitis.  LA class A.  Distal esophageal angulation and early stricturing.  Status post dilation to 18 mm.  Free gastroesophageal reflux.  Moderate sliding hiatal hernia around 3 to 4 cm.  Erythematous gastritis.  No definite Castellanos's.  Second portion of duodenum, biopsy revealed unremarkable duodenal mucosa with preserved villous architecture.  Antrum, body and fundus, biopsies revealed reactive gastropathy with minimal chronic inactive gastritis.  No Helicobacter organisms identified.  Negative for intestinal metaplasia, dysplasia and malignancy.  Stomach, body and fundus, polyps, biopsy revealed polypoid fragments of oxyntic mucosa with minimal chronic inactive gastritis and mild foveolar hyperplasia.  No Helicobacter organisms identified.  Negative for intestinal metaplasia, dysplasia and malignancy.     Colonoscopy dated 1/25/2021 prior polypectomy site rectosigmoid appeared normal.  1 3 mm polyp in sigmoid colon removed.  Diverticulosis in sigmoid colon.  Nonbleeding internal hemorrhoids.  Otherwise unremarkable.  Sigmoid colon polyp biopsy with hyperplastic polyp.     Colonoscopy was completed Dr. Coreas on 1/21/2022:  - The perianal and digital rectal examinations were normal.  - A few small-mouthed diverticula were found in the sigmoid colon.  - Anal papilla(e) were hypertrophied.  - The terminal ileum appeared normal.  No specimens collected.     US gb 2020:  FINDINGS:  The liver parenchyma is normal in echogenicity. There are  small polyps in the gallbladder wall There are no shadowing gallstones. The common duct is normal measuring 5.20 mm.   IMPRESSION:  Small  gallbladder wall polyps.    FL Esophagram Complete Single Contrast  Result Date: 5/6/2022  1. Small hiatal hernia without observed reflux. 2. Benign-appearing stricture at the GE junction not considered clinically significant given rapid passage of 12 mm barium tablet through the narrowed segment.      EGD was completed by Dr. Coreas 4/25/2022:  - The oropharynx was normal.  - The Z-line was irregular and was found 35 cm from the incisors.  - The lower third of the esophagus was moderately tortuous with acute angulation at distal esophagus likely from Hiatal hernia.  - A 2 cm hiatal hernia was present.  - LA Grade B (one or more mucosal breaks greater than 5 mm, not extending between the tops of two mucosal folds) esophagitis with no bleeding was found in the lower third of the esophagus.  - No endoscopic abnormality was evident in the esophagus to explain the patient's complaint of dysphagia except acute angulation at distal esophagus likely from hiatal hernia. Biopsies were obtained from the proximal and distal esophagus with cold forceps for histology for eosinophilic esophagitis.  - Patchy mildly erythematous mucosa without bleeding was found in the prepyloric region of the stomach. Biopsies were taken with a cold forceps for Helicobacter pylori testing.  - The duodenal bulb, first portion of the duodenum, second portion of the duodenum and third portion of the duodenum were normal. Biopsies for histology were taken with a cold forceps for evaluation of celiac disease.  Pathology showed normal duodenum, moderate chronic gastritis, no H pylori, esopahgeal biopsy showed reflux esophagitis without metaplasia.     Assessment / Plan      1. Esophageal dysphagia  2. Hiatal hernia  3. Gastroesophageal reflux disease with esophagitis without hemorrhage  She has long history of severe reflux with previous worsening of symptoms with frequent heartburn and reflux symptoms worse at night. that has improved now. GERD  currently controlled with omeprazole 40 mg once daily and pepcid 40 mg PRN heartburn nightly. Still with dysphagia and describes sensation that food is sitting in her chest and not going down with meals.    EGD done in June 2020 revealed lower esophagus peptic stricture which was dilated to 18 mm with a savory dilator.    EGD 4/2022 showed 2 cm hiatal hernia, LA grade B esophagitis,  lower third of the esophagus was moderately tortuous with acute angulation at distal esophagus likely from Hiatal hernia, patchy erythema in the stomach, normal duodenum. Pathology benign, no H pylori.      Acid reflux measures  Continue omeprazole 40 mg once daily  Continue Pepcid 40 mg nightly    4. RUQ abdominal pain  5. Nausea  6. Gallbladder polyp  Still with nausea, RUQ abdominal pain which is intermittent and worse when reflux symptoms are worse. She has not had any recent abdominal imaging. Recent basic labs have been normal including liver enzymes. No tenderness on abdominal exam. Has history of gb wall polyps and last ultrasound completed in 2020.     CTAP with IV contrast ordered  May consider repeat US gb if CT normal  Good control of reflux   Small meals  Continue zofran PRN nausea    - CT Abdomen Pelvis With Contrast; Future            Prior history:  History of adenomatous polyp of colon and large tubulovillous colon polyp  Colonoscopy 1/2022 showed normal colon mucosa, no colon polyps were removed.  She had diverticulosis of the sigmoid colon. Repeat colonoscopy in 3 years for surveillance due to large advanced villious adenoma with dysplasia excised, due 01/2025. We will place her on the recall list to be called to schedule an appointment closer to that date. She was instructed to call the office if no reminder call is made within the proper number of years. Also, the indications for a repeat colonoscopy sooner than the recall date were discussed; rectal bleeding, melena, change in bowel habits or significant abdominal  pain.    Prior colonoscopy in January 2021 with 1 small hyperplastic sigmoid polyp removed. Prior polypectomy site unremarkable.   Prior colonoscopy done on 7/31/2020  revealed 8 to 10 mm polyp in the ascending colon and 2 more in the hepatic flexure. 2 of these polyps were tubular adenoma and 1 of them sessile serrated adenoma without any dysplasia. She had a large 25 to 30 mm pedunculated lesion in the rectosigmoid junction which was removed with a hot snare piecemeal.  Pathology consistent with a tubulovillous adenoma with high-grade dysplasia.  Polyp stalk did not reveal any high-grade dysplasia.   There is no known family history of colon cancer or colon polyps.       Follow Up:   Will recommend further after imaging completed, will call with those results.       Pippa Hand PA-C  [facility]  6/20/2022  16:58 EDT    Please note that portions of this note may have been completed with a voice recognition program. Efforts were made to edit the dictations, but occasionally words are mistranscribed.

## 2022-06-20 NOTE — PATIENT INSTRUCTIONS
Elevate head of bed  Do not eat 3 hours before bed  Cut back on caffeine  Avoid NSAIDs  Take ASA with food  Continue protonix daily and pepcid as needed  CT scan will be arranged

## 2022-07-07 ENCOUNTER — HOSPITAL ENCOUNTER (OUTPATIENT)
Dept: CT IMAGING | Facility: HOSPITAL | Age: 67
Discharge: HOME | End: 2022-07-07
Admitting: PHYSICIAN ASSISTANT

## 2022-07-07 DIAGNOSIS — K82.4 GALLBLADDER POLYP: ICD-10-CM

## 2022-07-07 DIAGNOSIS — R10.11 RUQ ABDOMINAL PAIN: ICD-10-CM

## 2022-07-07 DIAGNOSIS — R11.0 NAUSEA: ICD-10-CM

## 2022-07-07 PROCEDURE — 25010000002 IOPAMIDOL 61 % SOLUTION: Performed by: PHYSICIAN ASSISTANT

## 2022-07-07 PROCEDURE — 74177 CT ABD & PELVIS W/CONTRAST: CPT

## 2022-07-07 RX ADMIN — IOPAMIDOL 100 ML: 612 INJECTION, SOLUTION INTRAVENOUS at 07:15

## 2022-07-08 DIAGNOSIS — R11.0 NAUSEA: ICD-10-CM

## 2022-07-08 DIAGNOSIS — K21.00 GASTROESOPHAGEAL REFLUX DISEASE WITH ESOPHAGITIS WITHOUT HEMORRHAGE: Primary | ICD-10-CM

## 2022-07-08 DIAGNOSIS — K82.4 GALLBLADDER POLYP: ICD-10-CM

## 2022-07-08 DIAGNOSIS — R10.11 RUQ ABDOMINAL PAIN: ICD-10-CM

## 2022-07-21 ENCOUNTER — OFFICE VISIT (OUTPATIENT)
Dept: INTERNAL MEDICINE | Facility: CLINIC | Age: 67
End: 2022-07-21

## 2022-07-21 VITALS
HEART RATE: 78 BPM | DIASTOLIC BLOOD PRESSURE: 80 MMHG | BODY MASS INDEX: 30.36 KG/M2 | SYSTOLIC BLOOD PRESSURE: 120 MMHG | HEIGHT: 62 IN | TEMPERATURE: 97.1 F | WEIGHT: 165 LBS | OXYGEN SATURATION: 98 %

## 2022-07-21 DIAGNOSIS — F41.9 ANXIETY: Primary | ICD-10-CM

## 2022-07-21 DIAGNOSIS — F32.A DEPRESSION, UNSPECIFIED DEPRESSION TYPE: ICD-10-CM

## 2022-07-21 PROCEDURE — 99213 OFFICE O/P EST LOW 20 MIN: CPT | Performed by: INTERNAL MEDICINE

## 2022-07-21 RX ORDER — HYDROXYZINE HYDROCHLORIDE 25 MG/1
25 TABLET, FILM COATED ORAL 3 TIMES DAILY PRN
Qty: 30 TABLET | Refills: 1 | Status: SHIPPED | OUTPATIENT
Start: 2022-07-21 | End: 2023-02-23

## 2022-07-21 RX ORDER — EZETIMIBE 10 MG/1
10 TABLET ORAL DAILY
COMMUNITY
End: 2022-09-12

## 2022-07-21 NOTE — PROGRESS NOTES
Subjective   Krystina Henley is a 66 y.o. female.     Chief Complaint   Patient presents with   • Anxiety     Symptoms worsening over last year; panic attacks  WARREN-7 score:: 13   • Depression     PHQ-9 score:  7       History of Present Illness   . Patient here complaining anxious nervous anxiety attack denies any suicidal thoughts. History of a depression anxiety medication self discontinued several years ago. Patient also has stressing life.    Current Outpatient Medications:   •  aspirin (aspirin) 81 MG EC tablet, Take 1 tablet by mouth Daily., Disp: 30 tablet, Rfl: 1  •  ezetimibe (ZETIA) 10 MG tablet, Take 10 mg by mouth Daily., Disp: , Rfl:   •  famotidine (PEPCID) 40 MG tablet, Take 1 tablet by mouth At Night As Needed for Heartburn., Disp: 30 tablet, Rfl: 2  •  multivitamin (THERAGRAN) tablet tablet, Take  by mouth Daily., Disp: , Rfl:   •  nitroglycerin (Nitrostat) 0.4 MG SL tablet, Place 1 tablet under the tongue Every 5 (Five) Minutes As Needed for Chest Pain. Take no more than 3 doses in 15 minutes., Disp: 12 tablet, Rfl: 12  •  omeprazole (priLOSEC) 40 MG capsule, TAKE 1 CAPSULE BY MOUTH EVERY DAY 30 minutes prior to breakfast, Disp: 30 capsule, Rfl: 5  •  ondansetron ODT (ZOFRAN-ODT) 4 MG disintegrating tablet, Place 1 tablet on the tongue 4 (Four) Times a Day As Needed for Nausea., Disp: 20 tablet, Rfl: 0  •  hydrOXYzine (ATARAX) 25 MG tablet, Take 1 tablet by mouth 3 (Three) Times a Day As Needed for Itching., Disp: 30 tablet, Rfl: 1  •  sertraline (Zoloft) 50 MG tablet, Take 1 tablet by mouth Daily., Disp: 30 tablet, Rfl: 1    The following portions of the patient's history were reviewed and updated as appropriate: allergies, current medications, past family history, past medical history, past social history, past surgical history and problem list.    Review of Systems   Constitutional: Negative.    Respiratory: Negative.    Cardiovascular: Negative.    Gastrointestinal: Negative.    Skin:  Negative.    Psychiatric/Behavioral: The patient is nervous/anxious.        Objective   Physical Exam  Constitutional:       Appearance: She is well-developed.   Cardiovascular:      Rate and Rhythm: Normal rate and regular rhythm.      Heart sounds: Normal heart sounds.   Pulmonary:      Effort: Pulmonary effort is normal.      Breath sounds: Normal breath sounds.   Abdominal:      General: Bowel sounds are normal.      Palpations: Abdomen is soft.   Musculoskeletal:      Cervical back: Neck supple.   Neurological:      Mental Status: She is alert and oriented to person, place, and time.   Psychiatric:         Behavior: Behavior normal.      Comments: anxious         All tests have been reviewed.    Assessment & Plan   Diagnoses and all orders for this visit:    Anxiety initiate medications  -     sertraline (Zoloft) 50 MG tablet; Take 1 tablet by mouth Daily.  -     hydrOXYzine (ATARAX) 25 MG tablet; Take 1 tablet by mouth 3 (Three) Times a Day As Needed for anxiety attack    Depression, initial Zoloft may need a Wellbutrin the future    Other orders  -     ezetimibe (ZETIA) 10 MG tablet; Take 10 mg by mouth Daily.      2 week after med     Wellness due          Answers for HPI/ROS submitted by the patient on 7/14/2022  Please describe your symptoms.: Extreme panic followed by confusion and forgetfulness  Have you had these symptoms before?: Yes  How long have you been having these symptoms?: Greater than 2 weeks  Please list any medications you are currently taking for this condition.: None  Please describe any probable cause for these symptoms. : Unknown  What is the primary reason for your visit?: Other

## 2022-08-08 ENCOUNTER — OFFICE VISIT (OUTPATIENT)
Dept: INTERNAL MEDICINE | Facility: CLINIC | Age: 67
End: 2022-08-08

## 2022-08-08 ENCOUNTER — HOSPITAL ENCOUNTER (OUTPATIENT)
Dept: ULTRASOUND IMAGING | Facility: HOSPITAL | Age: 67
Discharge: HOME | End: 2022-08-08
Admitting: PHYSICIAN ASSISTANT

## 2022-08-08 VITALS
TEMPERATURE: 97.1 F | HEIGHT: 62 IN | HEART RATE: 73 BPM | SYSTOLIC BLOOD PRESSURE: 114 MMHG | DIASTOLIC BLOOD PRESSURE: 76 MMHG | WEIGHT: 165 LBS | OXYGEN SATURATION: 98 % | BODY MASS INDEX: 30.36 KG/M2

## 2022-08-08 DIAGNOSIS — R10.11 RUQ ABDOMINAL PAIN: ICD-10-CM

## 2022-08-08 DIAGNOSIS — F41.9 ANXIETY: Primary | ICD-10-CM

## 2022-08-08 DIAGNOSIS — K82.4 GALLBLADDER POLYP: ICD-10-CM

## 2022-08-08 DIAGNOSIS — R11.0 NAUSEA: ICD-10-CM

## 2022-08-08 DIAGNOSIS — F32.A DEPRESSION, UNSPECIFIED DEPRESSION TYPE: ICD-10-CM

## 2022-08-08 DIAGNOSIS — K21.00 GASTROESOPHAGEAL REFLUX DISEASE WITH ESOPHAGITIS WITHOUT HEMORRHAGE: ICD-10-CM

## 2022-08-08 PROCEDURE — 76705 ECHO EXAM OF ABDOMEN: CPT

## 2022-08-08 PROCEDURE — 99213 OFFICE O/P EST LOW 20 MIN: CPT | Performed by: INTERNAL MEDICINE

## 2022-08-08 RX ORDER — FLUOXETINE 10 MG/1
CAPSULE ORAL
Qty: 30 CAPSULE | Refills: 0 | Status: SHIPPED | OUTPATIENT
Start: 2022-08-08 | End: 2022-09-12

## 2022-08-08 NOTE — PROGRESS NOTES
Subjective   Krystina Henley is a 66 y.o. female.     Chief Complaint   Patient presents with   • Follow-up   • Depression     Discuss medication- had to discontinue Zoloft due to side effects (light-headed/dizziness)       History of Present Illness   Patient here for follow-up patient states Zoloft does not help. Still has anxiousness in the depressed mood. Denies mood swing. No suicidal thoughts.    Current Outpatient Medications:   •  aspirin (aspirin) 81 MG EC tablet, Take 1 tablet by mouth Daily., Disp: 30 tablet, Rfl: 1  •  ezetimibe (ZETIA) 10 MG tablet, Take 10 mg by mouth Daily., Disp: , Rfl:   •  famotidine (PEPCID) 40 MG tablet, Take 1 tablet by mouth At Night As Needed for Heartburn., Disp: 30 tablet, Rfl: 2  •  hydrOXYzine (ATARAX) 25 MG tablet, Take 1 tablet by mouth 3 (Three) Times a Day As Needed for Itching., Disp: 30 tablet, Rfl: 1  •  multivitamin (THERAGRAN) tablet tablet, Take  by mouth Daily., Disp: , Rfl:   •  nitroglycerin (Nitrostat) 0.4 MG SL tablet, Place 1 tablet under the tongue Every 5 (Five) Minutes As Needed for Chest Pain. Take no more than 3 doses in 15 minutes., Disp: 12 tablet, Rfl: 12  •  omeprazole (priLOSEC) 40 MG capsule, TAKE 1 CAPSULE BY MOUTH EVERY DAY 30 minutes prior to breakfast, Disp: 30 capsule, Rfl: 5  •  ondansetron ODT (ZOFRAN-ODT) 4 MG disintegrating tablet, Place 1 tablet on the tongue 4 (Four) Times a Day As Needed for Nausea., Disp: 20 tablet, Rfl: 0  •  FLUoxetine (PROzac) 10 MG capsule, 1 daily po, Disp: 30 capsule, Rfl: 0    The following portions of the patient's history were reviewed and updated as appropriate: allergies, current medications, past family history, past medical history, past social history, past surgical history and problem list.    Review of Systems   Constitutional: Negative.    Respiratory: Negative.    Cardiovascular: Negative.    Gastrointestinal: Negative.    Skin: Negative.    Psychiatric/Behavioral: Negative for suicidal ideas.  The patient is nervous/anxious.         Depressed mood       Objective   Physical Exam  Constitutional:       Appearance: She is well-developed.   Cardiovascular:      Rate and Rhythm: Normal rate and regular rhythm.      Heart sounds: Normal heart sounds.   Pulmonary:      Effort: Pulmonary effort is normal.      Breath sounds: Normal breath sounds.   Abdominal:      General: Bowel sounds are normal.      Palpations: Abdomen is soft.   Musculoskeletal:      Cervical back: Neck supple.   Neurological:      Mental Status: She is alert and oriented to person, place, and time.   Psychiatric:         Behavior: Behavior normal.         All tests have been reviewed.    Assessment & Plan   Diagnoses and all orders for this visit:    Anxiety discontinue Zoloft initiate Prozac  -     FLUoxetine (PROzac) 10 MG capsule; 1 daily po    Depression, unspecified depression type  -     FLUoxetine (PROzac) 10 MG capsule; 1 daily po      3 week  Wellness and follow up medication

## 2022-09-10 DIAGNOSIS — F32.A DEPRESSION, UNSPECIFIED DEPRESSION TYPE: ICD-10-CM

## 2022-09-10 DIAGNOSIS — F41.9 ANXIETY: ICD-10-CM

## 2022-09-12 ENCOUNTER — LAB (OUTPATIENT)
Dept: LAB | Facility: HOSPITAL | Age: 67
End: 2022-09-12

## 2022-09-12 ENCOUNTER — HOSPITAL ENCOUNTER (OUTPATIENT)
Dept: CARDIOLOGY | Facility: HOSPITAL | Age: 67
Discharge: HOME | End: 2022-09-12

## 2022-09-12 ENCOUNTER — OFFICE VISIT (OUTPATIENT)
Dept: CARDIOLOGY | Facility: CLINIC | Age: 67
End: 2022-09-12

## 2022-09-12 VITALS
DIASTOLIC BLOOD PRESSURE: 68 MMHG | BODY MASS INDEX: 30.25 KG/M2 | SYSTOLIC BLOOD PRESSURE: 114 MMHG | HEART RATE: 62 BPM | OXYGEN SATURATION: 97 % | WEIGHT: 164.4 LBS | HEIGHT: 62 IN

## 2022-09-12 VITALS — WEIGHT: 165 LBS | BODY MASS INDEX: 30.36 KG/M2 | HEIGHT: 62 IN

## 2022-09-12 DIAGNOSIS — I10 ESSENTIAL HYPERTENSION: ICD-10-CM

## 2022-09-12 DIAGNOSIS — I65.23 BILATERAL CAROTID ARTERY STENOSIS: ICD-10-CM

## 2022-09-12 DIAGNOSIS — R06.09 DYSPNEA ON EXERTION: ICD-10-CM

## 2022-09-12 DIAGNOSIS — E78.2 MIXED HYPERLIPIDEMIA: ICD-10-CM

## 2022-09-12 DIAGNOSIS — I25.119 CORONARY ARTERY DISEASE INVOLVING NATIVE CORONARY ARTERY OF NATIVE HEART WITH ANGINA PECTORIS: Primary | ICD-10-CM

## 2022-09-12 LAB
BH CV XLRA MEAS LEFT DIST CCA EDV: 28 CM/SEC
BH CV XLRA MEAS LEFT DIST CCA PSV: 65.2 CM/SEC
BH CV XLRA MEAS LEFT DIST ICA EDV: 41.6 CM/SEC
BH CV XLRA MEAS LEFT DIST ICA PSV: 97.5 CM/SEC
BH CV XLRA MEAS LEFT ICA/CCA RATIO: 2.27
BH CV XLRA MEAS LEFT MID CCA EDV: 26.7 CM/SEC
BH CV XLRA MEAS LEFT MID CCA PSV: 77.7 CM/SEC
BH CV XLRA MEAS LEFT MID ICA EDV: 41.6 CM/SEC
BH CV XLRA MEAS LEFT MID ICA PSV: 114 CM/SEC
BH CV XLRA MEAS LEFT PROX CCA EDV: 37.3 CM/SEC
BH CV XLRA MEAS LEFT PROX CCA PSV: 130 CM/SEC
BH CV XLRA MEAS LEFT PROX ECA EDV: 35.4 CM/SEC
BH CV XLRA MEAS LEFT PROX ECA PSV: 196 CM/SEC
BH CV XLRA MEAS LEFT PROX ICA EDV: 60.7 CM/SEC
BH CV XLRA MEAS LEFT PROX ICA PSV: 148 CM/SEC
BH CV XLRA MEAS LEFT PROX SCLA PSV: 204 CM/SEC
BH CV XLRA MEAS LEFT VERTEBRAL A EDV: 13.3 CM/SEC
BH CV XLRA MEAS LEFT VERTEBRAL A PSV: 37.2 CM/SEC
BH CV XLRA MEAS RIGHT DIST CCA EDV: 34.1 CM/SEC
BH CV XLRA MEAS RIGHT DIST CCA PSV: 80.3 CM/SEC
BH CV XLRA MEAS RIGHT DIST ICA EDV: 55.3 CM/SEC
BH CV XLRA MEAS RIGHT DIST ICA PSV: 118 CM/SEC
BH CV XLRA MEAS RIGHT ICA/CCA RATIO: 1.31
BH CV XLRA MEAS RIGHT MID CCA EDV: 32.1 CM/SEC
BH CV XLRA MEAS RIGHT MID CCA PSV: 96.2 CM/SEC
BH CV XLRA MEAS RIGHT MID ICA EDV: 42 CM/SEC
BH CV XLRA MEAS RIGHT MID ICA PSV: 105 CM/SEC
BH CV XLRA MEAS RIGHT PROX CCA EDV: 16.5 CM/SEC
BH CV XLRA MEAS RIGHT PROX CCA PSV: 91 CM/SEC
BH CV XLRA MEAS RIGHT PROX ECA EDV: 14.7 CM/SEC
BH CV XLRA MEAS RIGHT PROX ECA PSV: 67.3 CM/SEC
BH CV XLRA MEAS RIGHT PROX ICA EDV: 30.4 CM/SEC
BH CV XLRA MEAS RIGHT PROX ICA PSV: 82.4 CM/SEC
BH CV XLRA MEAS RIGHT PROX SCLA PSV: 127 CM/SEC
BH CV XLRA MEAS RIGHT VERTEBRAL A EDV: 34.3 CM/SEC
BH CV XLRA MEAS RIGHT VERTEBRAL A PSV: 82.7 CM/SEC
CHOLEST SERPL-MCNC: 306 MG/DL (ref 0–200)
HDLC SERPL-MCNC: 77 MG/DL (ref 40–60)
LDLC SERPL CALC-MCNC: 207 MG/DL (ref 0–100)
LDLC/HDLC SERPL: 2.64 {RATIO}
LEFT ARM BP: NORMAL MMHG
MAXIMAL PREDICTED HEART RATE: 153 BPM
NT-PROBNP SERPL-MCNC: 55.4 PG/ML (ref 0–900)
RIGHT ARM BP: NORMAL MMHG
STRESS TARGET HR: 130 BPM
TRIGL SERPL-MCNC: 127 MG/DL (ref 0–150)
VLDLC SERPL-MCNC: 22 MG/DL (ref 5–40)

## 2022-09-12 PROCEDURE — 80061 LIPID PANEL: CPT | Performed by: INTERNAL MEDICINE

## 2022-09-12 PROCEDURE — 36415 COLL VENOUS BLD VENIPUNCTURE: CPT | Performed by: INTERNAL MEDICINE

## 2022-09-12 PROCEDURE — 93880 EXTRACRANIAL BILAT STUDY: CPT

## 2022-09-12 PROCEDURE — 99214 OFFICE O/P EST MOD 30 MIN: CPT | Performed by: INTERNAL MEDICINE

## 2022-09-12 PROCEDURE — 83880 ASSAY OF NATRIURETIC PEPTIDE: CPT

## 2022-09-12 PROCEDURE — 93880 EXTRACRANIAL BILAT STUDY: CPT | Performed by: INTERNAL MEDICINE

## 2022-09-12 RX ORDER — FLUOXETINE 10 MG/1
CAPSULE ORAL
Qty: 90 CAPSULE | Refills: 3 | Status: SHIPPED | OUTPATIENT
Start: 2022-09-12 | End: 2023-02-23

## 2022-09-12 RX ORDER — EZETIMIBE 10 MG/1
TABLET ORAL
Qty: 90 TABLET | Refills: 0 | Status: SHIPPED | OUTPATIENT
Start: 2022-09-12

## 2022-09-12 NOTE — TELEPHONE ENCOUNTER
Rx Refill Note  Requested Prescriptions     Pending Prescriptions Disp Refills   • FLUoxetine (PROzac) 10 MG capsule [Pharmacy Med Name: FLUoxetine HCl Oral Capsule 10 MG] 30 capsule 0     Sig: TAKE 1 CAPSULE BY MOUTH EVERY DAY      Last office visit with prescribing clinician: 8/8/2022      Next office visit with prescribing clinician: 9/29/2022            Lesa Gonzalez MA  09/12/22, 10:25 EDT

## 2022-09-12 NOTE — PROGRESS NOTES
[facility]  Office visit  Krystina Henley  1955  [phone]  There is no work phone number on file.    VISIT DATE:  9/12/2022    PCP: Tre Lindquist MD  [address] [place] [id number]    CC:  Chief Complaint   Patient presents with   • Coronary Artery Disease   • Chest Pain       Previous cardiac studies and procedures:  March 2021   Anai scan myocardial perfusion imaging  · Left ventricular ejection fraction is hyperdynamic (Calculated EF > 70%). .  · Myocardial perfusion imaging indicates a large-sized, severe area of ischemia located in the anterior wall, apex and septal wall.  · Impressions are consistent with a high risk study.  · Findings consistent with an abnormal ECG stress test.  Cardiac catheterization:  · 90 to 95% ostial LAD stenosis.  · Normal LVEDP  · No aortic stenosis  Bilateral carotid duplex  · Left internal carotid artery stenosis of 50-69%.  · Proximal right internal carotid artery plaque without significant stenosis.  · Right internal carotid artery stenosis of 0-49%.  Transthoracic echocardiogram  · Left ventricular ejection fraction appears to be 66 - 70%. Left ventricular systolic function is normal.  · Left ventricular diastolic function is consistent with (grade I) impaired relaxation.  · Estimated right ventricular systolic pressure from tricuspid regurgitation is normal (<35 mmHg).    April 2021: MIDCAB STONE to LAD.    ASSESSMENT:   Diagnosis Plan   1. Coronary artery disease involving native coronary artery of native heart with angina pectoris (HCC)     2. Essential hypertension     3. Mixed hyperlipidemia  Lipid Panel   4. Dyspnea on exertion  proBNP    Adult Transthoracic Echo Complete W/ Cont if Necessary Per Protocol       PLAN:  Coronary artery disease: Currently stable.  Shortness of breath in a class II pattern.  Continue aspirin 81 mg p.o. daily.  Afterload well controlled.  Transthoracic echo pending to assess postoperative  "EF.    Hyperlipidemia: Goal LDL less than 70.  Intolerant to simvastatin, rosuvastatin, and lovastatin due to arthralgias.  Only tolerating Zetia 3 times per week due to myalgias.  Starting preauthorization process for PCSK9 inhibitor.  If too expensive we will try alternative agents.    Carotid atherosclerosis: Mild right internal carotid stenosis, moderate left internal carotid artery stenosis.  Stable on duplex imaging today.  Annual ultrasound evaluation..  Continue aggressive risk factor modification.    Subjective  Interval assessment: Noticing shortness of breath in a class II pattern.  Denies chest discomfort.  Blood pressures running less than 130/80 mmHg.    Initial evaluation: 65-year-old female with a history of familial hyperlipidemia and family history of early coronary disease presents with gradually progressive exertional chest discomfort.  Describes low precordial chest discomfort radiating to her back and shoulders and arms with such activities as walking up an incline.  The symptoms have occurred consistently over the previous 2 years.  She has been self-medicating with nitroglycerin, she will walk until she develops limiting chest discomfort and then stop and take a sublingual nitroglycerin until her pain resolves.  She reports that she can walk about 1/8 of a mile on level ground before sprinting limiting chest discomfort but experiences chest discomfort which is limiting she encounters any sort of incline.  She has previously been intolerant to atorvastatin and simvastatin with regard to myalgias.  Previously prescribed rosuvastatin which she has not started taking yet.     PHYSICAL EXAMINATION:  Vitals:    09/12/22 0901   BP: 114/68   BP Location: Left arm   Patient Position: Sitting   Pulse: 62   SpO2: 97%   Weight: 74.6 kg (164 lb 6.4 oz)   Height: 156.2 cm (61.5\")     General Appearance:    Alert, cooperative, no distress, appears stated age   Head:    Normocephalic, without obvious " abnormality, atraumatic   Eyes:    conjunctiva/corneas clear   Nose:   Nares normal, septum midline, mucosa normal, no drainage   Throat:   Lips, teeth and gums normal   Neck:   Supple, symmetrical, trachea midline, no carotid    bruit or JVD   Lungs:     Clear to auscultation bilaterally, respirations unlabored   Chest Wall:    No tenderness or deformity    Heart:    Regular rate and rhythm, S1 and S2 normal, no murmur, rub   or gallop, normal carotid impulse bilaterally without bruit.                       Diagnostic Data:  Procedures  Lab Results   Component Value Date    CHLPL 320 (H) 02/24/2021    TRIG 146 03/07/2022    HDL 59 03/07/2022     Lab Results   Component Value Date    GLUCOSE 91 03/28/2022    BUN 18 03/28/2022    CREATININE 0.81 03/28/2022     03/28/2022    K 4.4 03/28/2022     03/28/2022    CO2 23.1 03/28/2022     No results found for: HGBA1C  Lab Results   Component Value Date    WBC 4.77 03/28/2022    HGB 12.4 03/28/2022    HCT 36.8 03/28/2022     03/28/2022       Allergies  No Known Allergies    Current Medications    Current Outpatient Medications:   •  aspirin (aspirin) 81 MG EC tablet, Take 1 tablet by mouth Daily., Disp: 30 tablet, Rfl: 1  •  ezetimibe (ZETIA) 10 MG tablet, Take 10 mg by mouth Daily., Disp: , Rfl:   •  famotidine (PEPCID) 40 MG tablet, Take 1 tablet by mouth At Night As Needed for Heartburn., Disp: 30 tablet, Rfl: 2  •  FLUoxetine (PROzac) 10 MG capsule, 1 daily po, Disp: 30 capsule, Rfl: 0  •  hydrOXYzine (ATARAX) 25 MG tablet, Take 1 tablet by mouth 3 (Three) Times a Day As Needed for Itching., Disp: 30 tablet, Rfl: 1  •  multivitamin (THERAGRAN) tablet tablet, Take 1 tablet by mouth Daily., Disp: , Rfl:   •  nitroglycerin (Nitrostat) 0.4 MG SL tablet, Place 1 tablet under the tongue Every 5 (Five) Minutes As Needed for Chest Pain. Take no more than 3 doses in 15 minutes., Disp: 12 tablet, Rfl: 12  •  omeprazole (priLOSEC) 40 MG capsule, TAKE 1 CAPSULE BY  MOUTH EVERY DAY 30 minutes prior to breakfast, Disp: 30 capsule, Rfl: 5  •  ondansetron ODT (ZOFRAN-ODT) 4 MG disintegrating tablet, Place 1 tablet on the tongue 4 (Four) Times a Day As Needed for Nausea., Disp: 20 tablet, Rfl: 0          ROS  ROS      SOCIAL HX  Social History     Socioeconomic History   • Marital status:    Tobacco Use   • Smoking status: Former Smoker     Packs/day: 1.00     Years: 10.00     Pack years: 10.00     Types: Cigarettes, Cigarettes     Quit date: 2016     Years since quittin.1   • Smokeless tobacco: Never Used   • Tobacco comment: Intermittent   Vaping Use   • Vaping Use: Never used   Substance and Sexual Activity   • Alcohol use: Yes     Alcohol/week: 3.0 standard drinks     Types: 3 Glasses of wine per week     Comment: social   • Drug use: No   • Sexual activity: Not Currently     Partners: Male     Birth control/protection: Post-menopausal       FAMILY HX  Family History   Problem Relation Age of Onset   • Lung cancer Mother    • Lung cancer Father    • Prostate cancer Brother         x 3 brothers   • Heart attack Brother         x 2 brothers    • Heart disease Brother    • Colon cancer Neg Hx              Ernesto Corona III, MD, FACC

## 2022-09-13 ENCOUNTER — TELEPHONE (OUTPATIENT)
Dept: CARDIOLOGY | Facility: CLINIC | Age: 67
End: 2022-09-13

## 2022-09-13 DIAGNOSIS — F41.9 ANXIETY: ICD-10-CM

## 2022-09-13 NOTE — TELEPHONE ENCOUNTER
Called [facility].Praluent is [phone].Called patient to inform her of the patient assistance program for Praluent. No answer. Left voice message to call our office.

## 2022-09-13 NOTE — TELEPHONE ENCOUNTER
Notified of patient assistance paperwork. Paperwork mailed to her today. She is going to call us back and let us know if she qualifies.Will await return call.

## 2022-09-26 ENCOUNTER — APPOINTMENT (OUTPATIENT)
Dept: CARDIOLOGY | Facility: HOSPITAL | Age: 67
End: 2022-09-26

## 2022-10-03 ENCOUNTER — HOSPITAL ENCOUNTER (OUTPATIENT)
Dept: CARDIOLOGY | Facility: HOSPITAL | Age: 67
Discharge: HOME | End: 2022-10-03
Admitting: INTERNAL MEDICINE

## 2022-10-03 VITALS — HEIGHT: 61 IN | WEIGHT: 164.46 LBS | BODY MASS INDEX: 31.05 KG/M2

## 2022-10-03 DIAGNOSIS — R06.09 DYSPNEA ON EXERTION: ICD-10-CM

## 2022-10-03 LAB
ASCENDING AORTA: 3.6 CM
BH CV ECHO MEAS - AO MAX PG: 6.9 MMHG
BH CV ECHO MEAS - AO MEAN PG: 4 MMHG
BH CV ECHO MEAS - AO ROOT DIAM: 3 CM
BH CV ECHO MEAS - AO V2 MAX: 131 CM/SEC
BH CV ECHO MEAS - AO V2 VTI: 28.6 CM
BH CV ECHO MEAS - AVA(I,D): 2.14 CM2
BH CV ECHO MEAS - EDV(CUBED): 70.4 ML
BH CV ECHO MEAS - EDV(MOD-SP2): 74 ML
BH CV ECHO MEAS - EDV(MOD-SP4): 50 ML
BH CV ECHO MEAS - EF(MOD-BP): 60 %
BH CV ECHO MEAS - EF(MOD-SP2): 59.5 %
BH CV ECHO MEAS - EF(MOD-SP4): 62 %
BH CV ECHO MEAS - ESV(CUBED): 18.8 ML
BH CV ECHO MEAS - ESV(MOD-SP2): 30 ML
BH CV ECHO MEAS - ESV(MOD-SP4): 19 ML
BH CV ECHO MEAS - FS: 35.6 %
BH CV ECHO MEAS - IVS/LVPW: 1.13 CM
BH CV ECHO MEAS - IVSD: 1 CM
BH CV ECHO MEAS - LA DIMENSION: 3.5 CM
BH CV ECHO MEAS - LAT PEAK E' VEL: 11.5 CM/SEC
BH CV ECHO MEAS - LV DIASTOLIC VOL/BSA (35-75): 28.5 CM2
BH CV ECHO MEAS - LV MASS(C)D: 123.2 GRAMS
BH CV ECHO MEAS - LV MAX PG: 3.7 MMHG
BH CV ECHO MEAS - LV MEAN PG: 2 MMHG
BH CV ECHO MEAS - LV SYSTOLIC VOL/BSA (12-30): 10.8 CM2
BH CV ECHO MEAS - LV V1 MAX: 96.1 CM/SEC
BH CV ECHO MEAS - LV V1 VTI: 19.5 CM
BH CV ECHO MEAS - LVIDD: 4.1 CM
BH CV ECHO MEAS - LVIDS: 2.7 CM
BH CV ECHO MEAS - LVOT AREA: 3.1 CM2
BH CV ECHO MEAS - LVOT DIAM: 2 CM
BH CV ECHO MEAS - LVPWD: 0.89 CM
BH CV ECHO MEAS - MED PEAK E' VEL: 6.76 CM/SEC
BH CV ECHO MEAS - MV A MAX VEL: 74 CM/SEC
BH CV ECHO MEAS - MV DEC SLOPE: 218 CM/SEC2
BH CV ECHO MEAS - MV DEC TIME: 0.28 MSEC
BH CV ECHO MEAS - MV E MAX VEL: 45.9 CM/SEC
BH CV ECHO MEAS - MV E/A: 0.62
BH CV ECHO MEAS - MV MAX PG: 2.6 MMHG
BH CV ECHO MEAS - MV MEAN PG: 1 MMHG
BH CV ECHO MEAS - MV P1/2T: 71.2 MSEC
BH CV ECHO MEAS - MV V2 VTI: 25.3 CM
BH CV ECHO MEAS - MVA(P1/2T): 3.1 CM2
BH CV ECHO MEAS - MVA(VTI): 2.42 CM2
BH CV ECHO MEAS - PA ACC SLOPE: 529 CM/SEC2
BH CV ECHO MEAS - PA ACC TIME: 0.12 SEC
BH CV ECHO MEAS - PA PR(ACCEL): 23.7 MMHG
BH CV ECHO MEAS - PA V2 MAX: 82.7 CM/SEC
BH CV ECHO MEAS - PI END-D VEL: 98.8 CM/SEC
BH CV ECHO MEAS - RAP SYSTOLE: 3 MMHG
BH CV ECHO MEAS - RVSP: 27 MMHG
BH CV ECHO MEAS - SI(MOD-SP2): 25 ML/M2
BH CV ECHO MEAS - SI(MOD-SP4): 17.6 ML/M2
BH CV ECHO MEAS - SV(LVOT): 61.3 ML
BH CV ECHO MEAS - SV(MOD-SP2): 44 ML
BH CV ECHO MEAS - SV(MOD-SP4): 31 ML
BH CV ECHO MEAS - TAPSE (>1.6): 2.1 CM
BH CV ECHO MEAS - TR MAX PG: 23.8 MMHG
BH CV ECHO MEAS - TR MAX VEL: 244 CM/SEC
BH CV ECHO MEASUREMENTS AVERAGE E/E' RATIO: 5.03
BH CV VAS BP LEFT ARM: NORMAL MMHG
BH CV XLRA - RV BASE: 3.6 CM
BH CV XLRA - RV LENGTH: 6.2 CM
BH CV XLRA - RV MID: 2.8 CM
BH CV XLRA - TDI S': 11.9 CM/SEC
LEFT ATRIUM VOLUME INDEX: 24.4 ML/M2
MAXIMAL PREDICTED HEART RATE: 153 BPM
STRESS TARGET HR: 130 BPM

## 2022-10-03 PROCEDURE — 93306 TTE W/DOPPLER COMPLETE: CPT | Performed by: INTERNAL MEDICINE

## 2022-10-03 PROCEDURE — 93306 TTE W/DOPPLER COMPLETE: CPT

## 2022-10-10 ENCOUNTER — TELEPHONE (OUTPATIENT)
Dept: CARDIOLOGY | Facility: CLINIC | Age: 67
End: 2022-10-10

## 2022-11-28 ENCOUNTER — TELEPHONE (OUTPATIENT)
Dept: INTERNAL MEDICINE | Facility: CLINIC | Age: 67
End: 2022-11-28

## 2022-11-28 NOTE — TELEPHONE ENCOUNTER
Patient did not complete MAMMOGRAM ordered on 11/22/2021.  This order is too old to be used and has been cancelled.

## 2022-12-02 DIAGNOSIS — R13.19 ESOPHAGEAL DYSPHAGIA: ICD-10-CM

## 2022-12-02 DIAGNOSIS — K21.9 GASTROESOPHAGEAL REFLUX DISEASE, UNSPECIFIED WHETHER ESOPHAGITIS PRESENT: ICD-10-CM

## 2022-12-02 RX ORDER — OMEPRAZOLE 40 MG/1
CAPSULE, DELAYED RELEASE ORAL
Qty: 30 CAPSULE | Refills: 0 | Status: SHIPPED | OUTPATIENT
Start: 2022-12-02 | End: 2022-12-29

## 2022-12-28 DIAGNOSIS — R13.19 ESOPHAGEAL DYSPHAGIA: ICD-10-CM

## 2022-12-28 DIAGNOSIS — K21.9 GASTROESOPHAGEAL REFLUX DISEASE, UNSPECIFIED WHETHER ESOPHAGITIS PRESENT: ICD-10-CM

## 2022-12-29 RX ORDER — OMEPRAZOLE 40 MG/1
CAPSULE, DELAYED RELEASE ORAL
Qty: 90 CAPSULE | Refills: 1 | Status: SHIPPED | OUTPATIENT
Start: 2022-12-29 | End: 2023-02-23

## 2022-12-30 NOTE — TELEPHONE ENCOUNTER
Reason for Call:  Appointment Request    Patient requesting this type of appt:  HEADACHES AND EYE ISSUES NOT GETTING BETTER      Requested provider: Emmanuelle Woodall    Reason patient unable to be scheduled: Not within requested timeframe- patient is open to a telephone visit as well    When does patient want to be seen/preferred time: 1-2 weeks    Comments: pt aware provider is on vacation wants to be seen sooner than February first available    Could we send this information to you in [facility] or would you prefer to receive a phone call?:   Patient would prefer a phone call   Okay to leave a detailed message?: no at Home number on file [phone] (home)    Call taken on 12/30/2022 at 10:30 AM by Amy Whaley     ----- Message from Ernesto Corona III, MD sent at 10/10/2022  2:19 PM EDT -----  Normal heart function noted on echo.

## 2023-02-23 ENCOUNTER — OFFICE VISIT (OUTPATIENT)
Dept: INTERNAL MEDICINE | Facility: CLINIC | Age: 68
End: 2023-02-23
Payer: MEDICARE

## 2023-02-23 VITALS
BODY MASS INDEX: 30.4 KG/M2 | DIASTOLIC BLOOD PRESSURE: 80 MMHG | TEMPERATURE: 98.4 F | HEART RATE: 78 BPM | OXYGEN SATURATION: 96 % | HEIGHT: 61 IN | SYSTOLIC BLOOD PRESSURE: 130 MMHG | WEIGHT: 161 LBS

## 2023-02-23 DIAGNOSIS — I10 ESSENTIAL HYPERTENSION: ICD-10-CM

## 2023-02-23 DIAGNOSIS — R93.89 ABNORMAL FINDINGS ON DIAGNOSTIC IMAGING OF OTHER SPECIFIED BODY STRUCTURES: ICD-10-CM

## 2023-02-23 DIAGNOSIS — Z00.00 WELLNESS EXAMINATION: ICD-10-CM

## 2023-02-23 DIAGNOSIS — F41.9 ANXIETY: ICD-10-CM

## 2023-02-23 DIAGNOSIS — I25.119 CORONARY ARTERY DISEASE INVOLVING NATIVE CORONARY ARTERY OF NATIVE HEART WITH ANGINA PECTORIS: ICD-10-CM

## 2023-02-23 DIAGNOSIS — Z12.31 ENCOUNTER FOR SCREENING MAMMOGRAM FOR MALIGNANT NEOPLASM OF BREAST: ICD-10-CM

## 2023-02-23 DIAGNOSIS — H93.13 TINNITUS OF BOTH EARS: ICD-10-CM

## 2023-02-23 DIAGNOSIS — K21.9 GASTROESOPHAGEAL REFLUX DISEASE WITHOUT ESOPHAGITIS: ICD-10-CM

## 2023-02-23 DIAGNOSIS — E78.2 MIXED HYPERLIPIDEMIA: Primary | ICD-10-CM

## 2023-02-23 DIAGNOSIS — E55.9 VITAMIN D DEFICIENCY, UNSPECIFIED: ICD-10-CM

## 2023-02-23 DIAGNOSIS — H90.0 CONDUCTIVE HEARING LOSS, BILATERAL: ICD-10-CM

## 2023-02-23 DIAGNOSIS — F32.0 CURRENT MILD EPISODE OF MAJOR DEPRESSIVE DISORDER WITHOUT PRIOR EPISODE: ICD-10-CM

## 2023-02-23 DIAGNOSIS — N39.41 URGE INCONTINENCE OF URINE: ICD-10-CM

## 2023-02-23 DIAGNOSIS — Z95.1 HX OF CABG: ICD-10-CM

## 2023-02-23 DIAGNOSIS — Z78.0 MENOPAUSE: ICD-10-CM

## 2023-02-23 PROBLEM — R13.19 ESOPHAGEAL DYSPHAGIA: Status: RESOLVED | Noted: 2022-01-20 | Resolved: 2023-02-23

## 2023-02-23 PROBLEM — R94.5 ABNORMAL RESULTS OF LIVER FUNCTION STUDIES: Status: RESOLVED | Noted: 2021-03-09 | Resolved: 2023-02-23

## 2023-02-23 PROBLEM — D75.839 THROMBOCYTOSIS: Status: RESOLVED | Noted: 2021-05-24 | Resolved: 2023-02-23

## 2023-02-23 PROBLEM — R10.11 RUQ ABDOMINAL PAIN: Status: RESOLVED | Noted: 2022-01-20 | Resolved: 2023-02-23

## 2023-02-23 PROBLEM — M19.90 ARTHRITIS: Status: RESOLVED | Noted: 2020-01-14 | Resolved: 2023-02-23

## 2023-02-23 PROCEDURE — G0439 PPPS, SUBSEQ VISIT: HCPCS | Performed by: INTERNAL MEDICINE

## 2023-02-23 PROCEDURE — 99397 PER PM REEVAL EST PAT 65+ YR: CPT | Performed by: INTERNAL MEDICINE

## 2023-02-23 PROCEDURE — 1159F MED LIST DOCD IN RCRD: CPT | Performed by: INTERNAL MEDICINE

## 2023-02-23 PROCEDURE — 1170F FXNL STATUS ASSESSED: CPT | Performed by: INTERNAL MEDICINE

## 2023-02-23 RX ORDER — SERTRALINE HYDROCHLORIDE 100 MG/1
1 TABLET, FILM COATED ORAL DAILY
COMMUNITY
Start: 2023-02-06

## 2023-02-23 RX ORDER — OMEPRAZOLE 40 MG/1
40 CAPSULE, DELAYED RELEASE ORAL DAILY
Qty: 30 CAPSULE | Refills: 2 | Status: SHIPPED | OUTPATIENT
Start: 2023-02-23 | End: 2023-02-23

## 2023-02-23 NOTE — PROGRESS NOTES
Subjective   Krystina Henley is a 67 y.o. female and is here for a comprehensive physical exam.     Do you take any herbs or supplements that were not prescribed by a doctor? no  Are you taking calcium supplements? no  Are you taking aspirin daily? no      The following portions of the patient's history were reviewed and updated as appropriate: allergies, current medications, past family history, past medical history, past social history, past surgical history and problem list.    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    HENT: Negative.    Eyes: Negative.    Respiratory: Negative.    Cardiovascular: Negative.    Gastrointestinal: Negative.    Endocrine: Negative.    Genitourinary: Negative.    Musculoskeletal: Negative.    Skin: Negative.    Allergic/Immunologic: Negative.    Neurological: Negative.    Hematological: Negative.    Psychiatric/Behavioral: Negative.        Physical Exam  Constitutional:       Appearance: She is well-developed.   HENT:      Head: Normocephalic and atraumatic.      Right Ear: External ear normal.      Left Ear: External ear normal.      Nose: Nose normal.   Eyes:      Conjunctiva/sclera: Conjunctivae normal.      Pupils: Pupils are equal, round, and reactive to light.   Cardiovascular:      Rate and  Rhythm: Normal rate and regular rhythm.      Heart sounds: Normal heart sounds.   Pulmonary:      Effort: Pulmonary effort is normal.      Breath sounds: Normal breath sounds.   Abdominal:      General: Bowel sounds are normal.      Palpations: Abdomen is soft.   Genitourinary:     Vagina: Normal.   Musculoskeletal:         General: Normal range of motion.      Cervical back: Normal range of motion and neck supple.   Skin:     General: Skin is warm and dry.   Neurological:      Mental Status: She is alert and oriented to person, place, and time.      Deep Tendon Reflexes: Reflexes are normal and symmetric.   Psychiatric:         Behavior: Behavior normal.         Thought Content: Thought content normal.         Judgment: Judgment normal.         All  tests have been reviewed.    Assessment & Plan          1. Patient Counseling:  --Nutrition: Stressed importance of moderation in sodium/caffeine intake, saturated fat and cholesterol, caloric balance, sufficient intake of fresh fruits, vegetables, fiber, calcium and iron.  --Exercise: Stressed the importance of regular exercise.   --Injury prevention: Discussed safety belts, safety helmets, smoke detector, smoking near bedding or upholstery.   --Dental health: Discussed importance of regular tooth brushing, flossing, and dental visits.  --Immunizations reviewed.  --Discussed benefits of screening colonoscopy.  --After hours service discussed with patient    2. Discussed the patient's BMI with her.    Body mass index is 29.93 kg/m².  BMI is >= 25 and <30. (Overweight) The following options were offered after discussion;: exercise counseling/recommendations and nutrition counseling/recommendations

## 2023-02-23 NOTE — PROGRESS NOTES
The ABCs of the Annual Wellness Visit  Subsequent Medicare Wellness Visit    Subjective      Krystina Henley is a 67 y.o. female who presents for a Subsequent Medicare Wellness Visit.    The following portions of the patient's history were reviewed and   updated as appropriate: allergies, current medications, past family history, past medical history, past social history, past surgical history and problem list.    Compared to one year ago, the patient feels her physical   health is the same.    Compared to one year ago, the patient feels her mental   health is the same.    Recent Hospitalizations:  She was not admitted to the hospital during the last year.       Current Medical Providers:  Patient Care Team:  Tre Lindquist MD as PCP - General (Internal Medicine)  Ernesto Corona III, MD as Cardiologist (Cardiology)    Outpatient Medications Prior to Visit   Medication Sig Dispense Refill   • aspirin (aspirin) 81 MG EC tablet Take 1 tablet by mouth Daily. 30 tablet 1   • ezetimibe (ZETIA) 10 MG tablet TAKE 1 TABLET BY MOUTH EVERY DAY 90 tablet 0   • nitroglycerin (Nitrostat) 0.4 MG SL tablet Place 1 tablet under the tongue Every 5 (Five) Minutes As Needed for Chest Pain. Take no more than 3 doses in 15 minutes. 12 tablet 12   • ondansetron ODT (ZOFRAN-ODT) 4 MG disintegrating tablet Place 1 tablet on the tongue 4 (Four) Times a Day As Needed for Nausea. 20 tablet 0   • sertraline (ZOLOFT) 100 MG tablet Take 1 tablet by mouth Daily.     • omeprazole (priLOSEC) 40 MG capsule TAKE 1 CAPSULE BY MOUTH EVERY DAY 30 MINUTES PRIOR TO BREAKFAST 90 capsule 1   • Alirocumab 75 MG/ML solution auto-injector Inject 1 mL under the skin into the appropriate area as directed Every 14 (Fourteen) Days. 2.24 mL 5   • famotidine (PEPCID) 40 MG tablet Take 1 tablet by mouth At Night As Needed for Heartburn. 30 tablet 2   • FLUoxetine (PROzac) 10 MG capsule TAKE 1 CAPSULE BY MOUTH EVERY DAY 90 capsule 3   • hydrOXYzine (ATARAX) 25 MG  "tablet Take 1 tablet by mouth 3 (Three) Times a Day As Needed for Itching. 30 tablet 1   • multivitamin (THERAGRAN) tablet tablet Take 1 tablet by mouth Daily.       No facility-administered medications prior to visit.       No opioid medication identified on active medication list. I have reviewed chart for other potential  high risk medication/s and harmful drug interactions in the elderly.          Aspirin is on active medication list. Aspirin use is indicated based on review of current medical condition/s. Pros and cons of this therapy have been discussed today. Benefits of this medication outweigh potential harm.  Patient has been encouraged to continue taking this medication.  .      Patient Active Problem List   Diagnosis   • Wellness examination   • Anxiety   • Depression   • Mixed hyperlipidemia   • Urinary incontinence   • Vitamin D deficiency, unspecified    • Gastroesophageal reflux disease without esophagitis   • Adenomatous polyp of colon   • Hyperplastic polyp of sigmoid colon   • Esophageal stricture   • Essential hypertension   • Coronary artery disease involving native coronary artery of native heart with angina pectoris (HCC)   • Menopause   • Tubulovillous adenoma   • Osteomyelitis of finger of left hand (HCC)   • Need for post exposure prophylaxis for rabies   • Hx of CABG   • Conductive hearing loss, bilateral   • Tinnitus of both ears     Advance Care Planning  Advance Directive is on file.  ACP discussion was held with the patient during this visit. Patient has an advance directive in EMR which is still valid.      Objective    Vitals:    02/23/23 1402   BP: 130/80   Pulse: 78   Temp: 98.4 °F (36.9 °C)   SpO2: 96%   Weight: 73 kg (161 lb)   Height: 156.2 cm (61.5\")   PainSc: 0-No pain     Estimated body mass index is 29.93 kg/m² as calculated from the following:    Height as of this encounter: 156.2 cm (61.5\").    Weight as of this encounter: 73 kg (161 lb).    BMI is >= 25 and <30. " (Overweight) The following options were offered after discussion;: exercise counseling/recommendations and nutrition counseling/recommendations      Does the patient have evidence of cognitive impairment?   No            HEALTH RISK ASSESSMENT    Smoking Status:  Social History     Tobacco Use   Smoking Status Former   • Packs/day: 1.00   • Years: 10.00   • Pack years: 10.00   • Types: Cigarettes   • Quit date: 2016   • Years since quittin.6   Smokeless Tobacco Never   Tobacco Comments    Intermittent     Alcohol Consumption:  Social History     Substance and Sexual Activity   Alcohol Use Yes   • Alcohol/week: 3.0 standard drinks   • Types: 3 Glasses of wine per week    Comment: social     Fall Risk Screen:    KATHIADI Fall Risk Assessment was completed, and patient is at LOW risk for falls.Assessment completed on:2023    Depression Screening:  PHQ-2/PHQ-9 Depression Screening 2023   Little Interest or Pleasure in Doing Things 2-->more than half the days   Feeling Down, Depressed or Hopeless 3-->nearly every day   Trouble Falling or Staying Asleep, or Sleeping Too Much 2-->more than half the days   Feeling Tired or Having Little Energy 0-->not at all   Poor Appetite or Overeating 1-->several days   Feeling Bad about Yourself - or that You are a Failure or Have Let Yourself or Your Family Down 1-->several days   Trouble Concentrating on Things, Such as Reading the Newspaper or Watching Television 1-->several days   Moving or Speaking So Slowly that Other People Could Have Noticed? Or the Opposite - Being So Fidgety 2-->more than half the days   Thoughts that You Would be Better Off Dead or of Hurting Yourself in Some Way 0-->not at all   PHQ-9: Brief Depression Severity Measure Score 12       Health Habits and Functional and Cognitive Screening:  Functional & Cognitive Status 2023   Do you have difficulty preparing food and eating? No   Do you have difficulty bathing yourself, getting dressed or  grooming yourself? No   Do you have difficulty using the toilet? No   Do you have difficulty moving around from place to place? No   Do you have trouble with steps or getting out of a bed or a chair? No   Current Diet Well Balanced Diet   Dental Exam Up to date        Dental Exam Comment 2022   Eye Exam Up to date        Eye Exam Comment 2022   Exercise (times per week) 0 times per week   Current Exercises Include No Regular Exercise   Current Exercise Activities Include -   Do you need help using the phone?  No   Are you deaf or do you have serious difficulty hearing?  Yes   Do you need help with transportation? No   Do you need help shopping? No   Do you need help preparing meals?  No   Do you need help with housework?  No   Do you need help with laundry? No   Do you need help taking your medications? No   Do you need help managing money? No   Do you ever drive or ride in a car without wearing a seat belt? Yes   Have you felt unusual stress, anger or loneliness in the last month? Yes   Who do you live with? Child   If you need help, do you have trouble finding someone available to you? No   Have you been bothered in the last four weeks by sexual problems? No   Do you have difficulty concentrating, remembering or making decisions? Yes       Age-appropriate Screening Schedule:  Refer to the list below for future screening recommendations based on patient's age, sex and/or medical conditions. Orders for these recommended tests are listed in the plan section. The patient has been provided with a written plan.    Health Maintenance   Topic Date Due   • DXA SCAN  Never done   • MAMMOGRAM  07/16/2020   • COLORECTAL CANCER SCREENING  02/23/2023 (Originally 1955)   • INFLUENZA VACCINE  03/31/2023 (Originally 8/1/2022)   • PAP SMEAR  04/17/2023 (Originally 7/16/2018)   • Pneumococcal Vaccine 65+ (1 - PCV) 07/21/2023 (Originally 12/11/2019)   • COVID-19 Vaccine (3 - Booster for Pfizer series) 07/09/2024 (Originally  2/4/2022)   • ZOSTER VACCINE (1 of 2) 02/11/2026 (Originally 12/11/2004)   • LIPID PANEL  09/12/2023   • ANNUAL WELLNESS VISIT  02/23/2024   • TDAP/TD VACCINES (2 - Td or Tdap) 02/13/2032   • HEPATITIS C SCREENING  Completed                CMS Preventative Services Quick Reference  Risk Factors Identified During Encounter:    None Identified    The above risks/problems have been discussed with the patient.  Pertinent information has been shared with the patient in the After Visit Summary.    Diagnoses and all orders for this visit:    1. Mixed hyperlipidemia (Primary) continue medication  -     Lipid Panel    2. Essential hypertension continue medication  -     CBC & Differential  -     Comprehensive Metabolic Panel    3. Coronary artery disease involving native coronary artery of native heart with angina pectoris (HCC) history of a CABG follow-up with cardiology  -     TSH    4. Vitamin D deficiency, unspecified   -     Vitamin D,25-Hydroxy    5. Gastroesophageal reflux disease without esophagitis follow-up with GI    7. Anxiety stable on Zoloft 100 mg daily    8. Current mild episode of major depressive disorder without prior episode (HCC) stable on 100 mg Zoloft    9. Urge incontinence of urine mild    10. Menopause  -     DEXA Bone Density Axial    11. Abnormal findings on diagnostic imaging of other specified body structures  -     TSH    12. Hx of CABG    13. Encounter for screening mammogram for malignant neoplasm of breast  -     Mammo Screening Digital Tomosynthesis Bilateral With CAD    14. Tinnitus of both ears refused to see ENT    15. Conductive hearing loss, bilateral refused to see ENT        Follow Up:   Next Medicare Wellness visit to be scheduled in 1 year.      An After Visit Summary and PPPS were made available to the patient.    Below is to historical records for reference only:   Esophageal stricture resolved after dilation     9. Urinary incontinence, urge and stress.  Improved after sling  procedure

## 2023-03-09 ENCOUNTER — HOSPITAL ENCOUNTER (OUTPATIENT)
Dept: MAMMOGRAPHY | Facility: HOSPITAL | Age: 68
Discharge: HOME | End: 2023-03-09
Payer: MEDICARE

## 2023-03-09 ENCOUNTER — APPOINTMENT (OUTPATIENT)
Dept: BONE DENSITY | Facility: HOSPITAL | Age: 68
End: 2023-03-09
Payer: MEDICARE

## 2023-03-09 PROCEDURE — 77067 SCR MAMMO BI INCL CAD: CPT

## 2023-03-09 PROCEDURE — 77063 BREAST TOMOSYNTHESIS BI: CPT

## 2023-03-09 PROCEDURE — 77080 DXA BONE DENSITY AXIAL: CPT

## 2023-04-06 ENCOUNTER — OFFICE VISIT (OUTPATIENT)
Dept: INTERNAL MEDICINE | Facility: CLINIC | Age: 68
End: 2023-04-06
Payer: MEDICARE

## 2023-04-06 VITALS
SYSTOLIC BLOOD PRESSURE: 130 MMHG | DIASTOLIC BLOOD PRESSURE: 76 MMHG | TEMPERATURE: 98 F | BODY MASS INDEX: 29.63 KG/M2 | HEART RATE: 78 BPM | WEIGHT: 161 LBS | OXYGEN SATURATION: 100 % | HEIGHT: 62 IN

## 2023-04-06 DIAGNOSIS — I10 ESSENTIAL HYPERTENSION: ICD-10-CM

## 2023-04-06 DIAGNOSIS — F32.0 CURRENT MILD EPISODE OF MAJOR DEPRESSIVE DISORDER WITHOUT PRIOR EPISODE: ICD-10-CM

## 2023-04-06 DIAGNOSIS — E55.9 VITAMIN D DEFICIENCY, UNSPECIFIED: ICD-10-CM

## 2023-04-06 DIAGNOSIS — F41.9 ANXIETY: ICD-10-CM

## 2023-04-06 DIAGNOSIS — E78.2 MIXED HYPERLIPIDEMIA: Primary | ICD-10-CM

## 2023-04-06 DIAGNOSIS — K21.9 GASTROESOPHAGEAL REFLUX DISEASE WITHOUT ESOPHAGITIS: ICD-10-CM

## 2023-04-06 PROBLEM — Z00.00 WELLNESS EXAMINATION: Status: RESOLVED | Noted: 2018-07-17 | Resolved: 2023-04-06

## 2023-04-06 PROBLEM — Z78.0 MENOPAUSE: Status: RESOLVED | Noted: 2021-05-24 | Resolved: 2023-04-06

## 2023-04-06 PROBLEM — K22.2 ESOPHAGEAL STRICTURE: Status: RESOLVED | Noted: 2021-03-09 | Resolved: 2023-04-06

## 2023-04-06 PROCEDURE — 99214 OFFICE O/P EST MOD 30 MIN: CPT | Performed by: INTERNAL MEDICINE

## 2023-04-06 PROCEDURE — 3078F DIAST BP <80 MM HG: CPT | Performed by: INTERNAL MEDICINE

## 2023-04-06 PROCEDURE — 1159F MED LIST DOCD IN RCRD: CPT | Performed by: INTERNAL MEDICINE

## 2023-04-06 PROCEDURE — 1160F RVW MEDS BY RX/DR IN RCRD: CPT | Performed by: INTERNAL MEDICINE

## 2023-04-06 PROCEDURE — 3075F SYST BP GE 130 - 139MM HG: CPT | Performed by: INTERNAL MEDICINE

## 2023-04-06 RX ORDER — OMEPRAZOLE 40 MG/1
40 CAPSULE, DELAYED RELEASE ORAL DAILY PRN
Qty: 30 CAPSULE | Refills: 2 | Status: SHIPPED | OUTPATIENT
Start: 2023-04-06

## 2023-04-06 RX ORDER — OMEPRAZOLE 40 MG/1
CAPSULE, DELAYED RELEASE ORAL
COMMUNITY
Start: 2023-03-27 | End: 2023-04-06 | Stop reason: SDUPTHER

## 2023-04-06 NOTE — PROGRESS NOTES
Subjective   Krystina Henley is a 67 y.o. female.     Chief Complaint   Patient presents with   • Hypertension   • Hyperlipidemia       History of Present Illness  Pt presents for 1-month followup after annual wellness. Her most recent DEXA scan showed osteopenia with a 10-year fracture risk of 10%. Her mammogram results were benign. She did not receive results from her labs ordered at that time although she is confident that they were drawn at the hospital when she presented for her DEXA scan.         Current Outpatient Medications:   •  aspirin (aspirin) 81 MG EC tablet, Take 1 tablet by mouth Daily., Disp: 30 tablet, Rfl: 1  •  ezetimibe (ZETIA) 10 MG tablet, TAKE 1 TABLET BY MOUTH EVERY DAY, Disp: 90 tablet, Rfl: 0  •  nitroglycerin (Nitrostat) 0.4 MG SL tablet, Place 1 tablet under the tongue Every 5 (Five) Minutes As Needed for Chest Pain. Take no more than 3 doses in 15 minutes., Disp: 12 tablet, Rfl: 12  •  ondansetron ODT (ZOFRAN-ODT) 4 MG disintegrating tablet, Place 1 tablet on the tongue 4 (Four) Times a Day As Needed for Nausea., Disp: 20 tablet, Rfl: 0  •  sertraline (ZOLOFT) 100 MG tablet, Take 1 tablet by mouth Daily., Disp: , Rfl:   •  omeprazole (priLOSEC) 40 MG capsule, , Disp: , Rfl:     The following portions of the patient's history were reviewed and updated as appropriate: allergies, current medications, past family history, past medical history, past social history, past surgical history and problem list.    Review of Systems   Constitutional: Negative.    Respiratory: Negative.    Cardiovascular: Negative.    Gastrointestinal: Negative.    Skin: Negative.    Psychiatric/Behavioral: Negative.        Objective   Physical Exam  Constitutional:       Appearance: She is well-developed.   Cardiovascular:      Rate and Rhythm: Normal rate and regular rhythm.      Heart sounds: Normal heart sounds.   Pulmonary:      Effort: Pulmonary effort is normal.      Breath sounds: Normal breath sounds.    Abdominal:      General: Bowel sounds are normal.      Palpations: Abdomen is soft.   Musculoskeletal:      Cervical back: Neck supple.   Neurological:      Mental Status: She is alert and oriented to person, place, and time.   Psychiatric:         Behavior: Behavior normal.         All tests have been reviewed.    Assessment & Plan   There are no diagnoses linked to this encounter.          1. Mixed hyperlipidemia (Primary) continue medication  -     Lipid Panel     2. Essential hypertension continue medication  -     CBC & Differential  -     Comprehensive Metabolic Panel     4. Vitamin D deficiency, unspecified   -     Vitamin D,25-Hydroxy     5. Gastroesophageal reflux disease without esophagitis follow-up with GI PPI change to prn      7. Anxiety stable on Zoloft 100 mg daily     8. Current mild episode of major depressive disorder without prior episode (HCC) stable on 100 mg Zoloft     9. Urge incontinence of urine mild, stable     - Osteopenia, counseled good diet, mild resistance exercise  - Vitamin D     12. Hx of CABG cardiology following     14. Tinnitus of both ears refused to see ENT     15. Conductive hearing loss, bilateral refused to see ENT      Below is to historical records for reference only:   Esophageal stricture resolved after dilation  Urinary incontinence, urge and stress.  Improved after sling procedure

## 2023-04-20 ENCOUNTER — OFFICE VISIT (OUTPATIENT)
Dept: CARDIOLOGY | Facility: CLINIC | Age: 68
End: 2023-04-20
Payer: MEDICARE

## 2023-04-20 VITALS
WEIGHT: 155 LBS | HEIGHT: 62 IN | BODY MASS INDEX: 28.52 KG/M2 | SYSTOLIC BLOOD PRESSURE: 118 MMHG | OXYGEN SATURATION: 98 % | DIASTOLIC BLOOD PRESSURE: 60 MMHG | HEART RATE: 70 BPM

## 2023-04-20 DIAGNOSIS — E78.2 MIXED HYPERLIPIDEMIA: ICD-10-CM

## 2023-04-20 DIAGNOSIS — R41.3 AMNESIA: ICD-10-CM

## 2023-04-20 DIAGNOSIS — I25.119 CORONARY ARTERY DISEASE INVOLVING NATIVE CORONARY ARTERY OF NATIVE HEART WITH ANGINA PECTORIS: Primary | ICD-10-CM

## 2023-04-20 DIAGNOSIS — I10 ESSENTIAL HYPERTENSION: ICD-10-CM

## 2023-04-20 PROCEDURE — 93000 ELECTROCARDIOGRAM COMPLETE: CPT | Performed by: INTERNAL MEDICINE

## 2023-04-20 PROCEDURE — 3078F DIAST BP <80 MM HG: CPT | Performed by: INTERNAL MEDICINE

## 2023-04-20 PROCEDURE — 3074F SYST BP LT 130 MM HG: CPT | Performed by: INTERNAL MEDICINE

## 2023-04-20 RX ORDER — FAMOTIDINE 20 MG/1
TABLET, FILM COATED ORAL
COMMUNITY
End: 2023-04-20

## 2023-04-20 RX ORDER — EZETIMIBE 10 MG/1
10 TABLET ORAL DAILY
Qty: 90 TABLET | Refills: 3 | Status: SHIPPED | OUTPATIENT
Start: 2023-04-20

## 2023-04-20 NOTE — PROGRESS NOTES
[facility]  Office visit  Krystina Henley  1955  [phone]  There is no work phone number on file.    VISIT DATE:  4/20/2023    PCP: Tre Lindquist MD  [address] [place] [id number]    CC:  Chief Complaint   Patient presents with   • Coronary artery disease involving native coronary artery of       Previous cardiac studies and procedures:  March 2021   Anai scan myocardial perfusion imaging  · Left ventricular ejection fraction is hyperdynamic (Calculated EF > 70%). .  · Myocardial perfusion imaging indicates a large-sized, severe area of ischemia located in the anterior wall, apex and septal wall.  · Impressions are consistent with a high risk study.  · Findings consistent with an abnormal ECG stress test.  Cardiac catheterization:  · 90 to 95% ostial LAD stenosis.  · Normal LVEDP  · No aortic stenosis  Bilateral carotid duplex  · Left internal carotid artery stenosis of 50-69%.  · Proximal right internal carotid artery plaque without significant stenosis.  · Right internal carotid artery stenosis of 0-49%.  Transthoracic echocardiogram  · Left ventricular ejection fraction appears to be 66 - 70%. Left ventricular systolic function is normal.  · Left ventricular diastolic function is consistent with (grade I) impaired relaxation.  · Estimated right ventricular systolic pressure from tricuspid regurgitation is normal (<35 mmHg).    April 2021: MIDCAB STONE to LAD.    September 2022 carotid duplex  • Right internal carotid artery stenosis of 0-49%.  • Left internal carotid artery stenosis of 50-69%.    October 2022 TTE  • Left ventricular ejection fraction appears to be 61 - 65%. Left ventricular systolic function is normal.  • Left ventricular diastolic function is consistent with (grade I) impaired relaxation.    ASSESSMENT:   Diagnosis Plan   1. Coronary artery disease involving native coronary artery of native heart with angina pectoris        2. Essential  hypertension        3. Mixed hyperlipidemia            PLAN:  Coronary artery disease: Currently stable and asymptomatic.  Continue aspirin 81 mg p.o. daily.  Afterload well controlled.  Transthoracic echo pending to assess postoperative EF.    Hyperlipidemia: Goal LDL less than 70.  Intolerant to simvastatin, rosuvastatin, and lovastatin due to arthralgias.  Continue Zetia.  Lipid profile pending.  PCSK9 inhibitor cost prohibitive.  Starting PA process for Leqvio.    Carotid atherosclerosis: Mild right internal carotid stenosis, moderate left internal carotid artery stenosis.  Annual ultrasound evaluation..  Continue aggressive risk factor modification.    Recurrent global amnesia: Recommending neurology consultation.    Subjective  Interval assessment: Noticing shortness of breath in a class II pattern, stable.  Denies chest discomfort.  Blood pressures running less than 130/80 mmHg.  Compliant with medical therapy.  Interesting she is reported about 3-4 episodes in which she has had transient global amnesia over the previous year, will often  follow episodes of high anxiety.     Initial evaluation: 65-year-old female with a history of familial hyperlipidemia and family history of early coronary disease presents with gradually progressive exertional chest discomfort.  Describes low precordial chest discomfort radiating to her back and shoulders and arms with such activities as walking up an incline.  The symptoms have occurred consistently over the previous 2 years.  She has been self-medicating with nitroglycerin, she will walk until she develops limiting chest discomfort and then stop and take a sublingual nitroglycerin until her pain resolves.  She reports that she can walk about 1/8 of a mile on level ground before sprinting limiting chest discomfort but experiences chest discomfort which is limiting she encounters any sort of incline.  She has previously been intolerant to atorvastatin and simvastatin with  "regard to myalgias.  Previously prescribed rosuvastatin which she has not started taking yet.     PHYSICAL EXAMINATION:  Vitals:    04/20/23 1339   BP: 118/60   BP Location: Left arm   Patient Position: Sitting   Pulse: 70   SpO2: 98%   Weight: 70.3 kg (155 lb)   Height: 156.2 cm (61.5\")     General Appearance:    Alert, cooperative, no distress, appears stated age   Head:    Normocephalic, without obvious abnormality, atraumatic   Eyes:    conjunctiva/corneas clear   Nose:   Nares normal, septum midline, mucosa normal, no drainage   Throat:   Lips, teeth and gums normal   Neck:   Supple, symmetrical, trachea midline, no carotid    bruit or JVD   Lungs:     Clear to auscultation bilaterally, respirations unlabored   Chest Wall:    No tenderness or deformity    Heart:    Regular rate and rhythm, S1 and S2 normal, no murmur, rub   or gallop, normal carotid impulse bilaterally without bruit.                       Diagnostic Data:    ECG 12 Lead    Date/Time: 4/20/2023 1:51 PM  Performed by: Ernesto Corona III, MD  Authorized by: Ernesto Corona III, MD   Comparison: compared with previous ECG from 3/10/2021  Similar to previous ECG  Rhythm: sinus rhythm  Ectopy: unifocal PVCs  Q waves: III and aVF      Clinical impression: abnormal EKG          Lab Results   Component Value Date    CHLPL 320 (H) 02/24/2021    TRIG 127 09/12/2022    HDL 77 (H) 09/12/2022     Lab Results   Component Value Date    GLUCOSE 91 03/28/2022    BUN 18 03/28/2022    CREATININE 0.81 03/28/2022     03/28/2022    K 4.4 03/28/2022     03/28/2022    CO2 23.1 03/28/2022     No results found for: HGBA1C  Lab Results   Component Value Date    WBC 4.77 03/28/2022    HGB 12.4 03/28/2022    HCT 36.8 03/28/2022     03/28/2022       Allergies  No Known Allergies    Current Medications    Current Outpatient Medications:   •  aspirin (aspirin) 81 MG EC tablet, Take 1 tablet by mouth Daily., Disp: 30 tablet, Rfl: 1  •  ezetimibe (ZETIA) 10 MG " tablet, TAKE 1 TABLET BY MOUTH EVERY DAY, Disp: 90 tablet, Rfl: 0  •  nitroglycerin (Nitrostat) 0.4 MG SL tablet, Place 1 tablet under the tongue Every 5 (Five) Minutes As Needed for Chest Pain. Take no more than 3 doses in 15 minutes., Disp: 12 tablet, Rfl: 12  •  omeprazole (priLOSEC) 40 MG capsule, Take 1 capsule by mouth Daily As Needed (heart burn)., Disp: 30 capsule, Rfl: 2  •  ondansetron ODT (ZOFRAN-ODT) 4 MG disintegrating tablet, Place 1 tablet on the tongue 4 (Four) Times a Day As Needed for Nausea., Disp: 20 tablet, Rfl: 0  •  sertraline (ZOLOFT) 100 MG tablet, Take 1 tablet by mouth Daily. (Patient not taking: Reported on 2023), Disp: , Rfl:           ROS  ROS      SOCIAL HX  Social History     Socioeconomic History   • Marital status:    Tobacco Use   • Smoking status: Former     Packs/day: 1.00     Years: 10.00     Pack years: 10.00     Types: Cigarettes     Quit date: 2016     Years since quittin.7   • Smokeless tobacco: Never   • Tobacco comments:     Intermittent   Vaping Use   • Vaping Use: Never used   Substance and Sexual Activity   • Alcohol use: Yes     Alcohol/week: 3.0 standard drinks     Types: 3 Glasses of wine per week     Comment: social   • Drug use: Never   • Sexual activity: Not Currently     Partners: Male     Birth control/protection: Post-menopausal       FAMILY HX  Family History   Problem Relation Age of Onset   • Lung cancer Mother    • Anxiety disorder Mother    • Lung cancer Father    • Cancer Father    • Prostate cancer Brother         x 3 brothers   • Heart attack Brother         x 2 brothers    • Heart disease Brother    • Colon cancer Neg Hx              Ernesto Corona III, MD, FACC

## 2023-04-24 ENCOUNTER — OFFICE VISIT (OUTPATIENT)
Dept: NEUROLOGY | Facility: CLINIC | Age: 68
End: 2023-04-24
Payer: MEDICARE

## 2023-04-24 VITALS
WEIGHT: 155 LBS | HEART RATE: 66 BPM | BODY MASS INDEX: 29.27 KG/M2 | HEIGHT: 61 IN | SYSTOLIC BLOOD PRESSURE: 114 MMHG | DIASTOLIC BLOOD PRESSURE: 66 MMHG | OXYGEN SATURATION: 95 %

## 2023-04-24 DIAGNOSIS — G31.84 AMNESTIC MCI (MILD COGNITIVE IMPAIRMENT WITH MEMORY LOSS): Primary | ICD-10-CM

## 2023-04-24 PROBLEM — L03.119 CELLULITIS OF HAND: Status: ACTIVE | Noted: 2022-02-16

## 2023-04-24 PROBLEM — E87.70 HYPERVOLEMIA: Status: ACTIVE | Noted: 2021-04-08

## 2023-04-24 PROBLEM — J98.11 ATELECTASIS: Status: ACTIVE | Noted: 2021-04-08
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
## 2023-04-24 PROBLEM — E66.09 OTHER OBESITY DUE TO EXCESS CALORIES: Status: ACTIVE | Noted: 2022-02-21

## 2023-04-24 LAB
25(OH)D3+25(OH)D2 SERPL-MCNC: 38.3 NG/ML (ref 30–100)
ALBUMIN SERPL-MCNC: 4.5 G/DL (ref 3.5–5.2)
ALBUMIN/GLOB SERPL: 1.6 G/DL
ALP SERPL-CCNC: 72 U/L (ref 39–117)
ALT SERPL-CCNC: 14 U/L (ref 1–33)
AST SERPL-CCNC: 16 U/L (ref 1–32)
BASOPHILS # BLD AUTO: 0.05 10*3/MM3 (ref 0–0.2)
BASOPHILS NFR BLD AUTO: 0.8 % (ref 0–1.5)
BILIRUB SERPL-MCNC: <0.2 MG/DL (ref 0–1.2)
BUN SERPL-MCNC: 18 MG/DL (ref 8–23)
BUN/CREAT SERPL: 22 (ref 7–25)
CALCIUM SERPL-MCNC: 9.7 MG/DL (ref 8.6–10.5)
CHLORIDE SERPL-SCNC: 106 MMOL/L (ref 98–107)
CHOLEST SERPL-MCNC: 325 MG/DL (ref 0–200)
CO2 SERPL-SCNC: 29.4 MMOL/L (ref 22–29)
CREAT SERPL-MCNC: 0.82 MG/DL (ref 0.57–1)
EGFRCR SERPLBLD CKD-EPI 2021: 78 ML/MIN/1.73
EOSINOPHIL # BLD AUTO: 0.25 10*3/MM3 (ref 0–0.4)
EOSINOPHIL NFR BLD AUTO: 4 % (ref 0.3–6.2)
ERYTHROCYTE [DISTWIDTH] IN BLOOD BY AUTOMATED COUNT: 14.5 % (ref 12.3–15.4)
GLOBULIN SER CALC-MCNC: 2.8 GM/DL
GLUCOSE SERPL-MCNC: 88 MG/DL (ref 65–99)
HCT VFR BLD AUTO: 39.5 % (ref 34–46.6)
HDLC SERPL-MCNC: 85 MG/DL (ref 40–60)
HGB BLD-MCNC: 13.3 G/DL (ref 12–15.9)
IMM GRANULOCYTES # BLD AUTO: 0.01 10*3/MM3 (ref 0–0.05)
IMM GRANULOCYTES NFR BLD AUTO: 0.2 % (ref 0–0.5)
LDLC SERPL CALC-MCNC: 222 MG/DL (ref 0–100)
LYMPHOCYTES # BLD AUTO: 2.53 10*3/MM3 (ref 0.7–3.1)
LYMPHOCYTES NFR BLD AUTO: 40.7 % (ref 19.6–45.3)
MCH RBC QN AUTO: 28.7 PG (ref 26.6–33)
MCHC RBC AUTO-ENTMCNC: 33.7 G/DL (ref 31.5–35.7)
MCV RBC AUTO: 85.1 FL (ref 79–97)
MONOCYTES # BLD AUTO: 0.57 10*3/MM3 (ref 0.1–0.9)
MONOCYTES NFR BLD AUTO: 9.2 % (ref 5–12)
NEUTROPHILS # BLD AUTO: 2.8 10*3/MM3 (ref 1.7–7)
NEUTROPHILS NFR BLD AUTO: 45.1 % (ref 42.7–76)
NRBC BLD AUTO-RTO: 0 /100 WBC (ref 0–0.2)
PLATELET # BLD AUTO: 308 10*3/MM3 (ref 140–450)
POTASSIUM SERPL-SCNC: 4.9 MMOL/L (ref 3.5–5.2)
PROT SERPL-MCNC: 7.3 G/DL (ref 6–8.5)
RBC # BLD AUTO: 4.64 10*6/MM3 (ref 3.77–5.28)
SODIUM SERPL-SCNC: 142 MMOL/L (ref 136–145)
TRIGL SERPL-MCNC: 108 MG/DL (ref 0–150)
TSH SERPL DL<=0.005 MIU/L-ACNC: 2.58 UIU/ML (ref 0.27–4.2)
VLDLC SERPL CALC-MCNC: 18 MG/DL (ref 5–40)
WBC # BLD AUTO: 6.21 10*3/MM3 (ref 3.4–10.8)

## 2023-04-24 PROCEDURE — 3074F SYST BP LT 130 MM HG: CPT

## 2023-04-24 PROCEDURE — 1159F MED LIST DOCD IN RCRD: CPT

## 2023-04-24 PROCEDURE — 99214 OFFICE O/P EST MOD 30 MIN: CPT

## 2023-04-24 PROCEDURE — 1160F RVW MEDS BY RX/DR IN RCRD: CPT

## 2023-04-24 PROCEDURE — 3078F DIAST BP <80 MM HG: CPT

## 2023-04-24 NOTE — PROGRESS NOTES
Neuro Office Visit      Encounter Date: 2023   Patient Name: Krystina Henley  : 1955   MRN: [id number]   PCP:  Tre Lindquist MD     Chief Complaint:    Chief Complaint   Patient presents with   • amnesia       History of Present Illness: Krystina Henley is a 67 y.o. female who is here today in Neurology for episodes of amnesia.  Referred by Dr. Ernesto Corona. She has a past medical history of HTN, HLD, CAD, CABG, conductive hearing loss bilateral ears, tinnitus both ears, vitamin D deficiency, GERD, urinary incontinence, tubulovillous adenoma, anxiety, depression, osteomyelitis of finger of left hand.  She reports that she has had intermittent episodes of amnesia for approximately a year which are often associated with periods of high anxiety.  She tells me that her primary care has started her on Zoloft which significantly helped with the anxiety but then caused tremors so she had the dose decreased and that she is working with primary care to address antianxiety medications.  Her daughter is here with her today and states that when she has the 6 episodes of extreme fear she often will be amnestic surrounding the event.  They recall specific time in 2022 when she was swimming with [employer] and that she cannot remember this day at all, daughter reports about 2-3 of these episodes where she has complete amnesia surrounding an event.  They have also noticed that overall her memory is declining.   She has noted symptoms since at least  marked initially by forgetfulness . This has gradually worsened  over time. Additional symptoms have included impairments in short term memory and learning new things . There have been associated  symptoms of anxiety  and depression . She denies  impairments in ADL's. She manages her medications  and finances. She continues to drive.  . She is currently residing at home with daughter.     Family History: None  Personal Neurological History: No history  of stroke or seizure  Education & Work History: RN  Psychological History: Anxiety and Depression  Sleep Habits & History: Affected by sleep  Chronic Pain: None  Hearing or Vision Impairments: Mild hearing   Personal History of Cancer: None    She follows with Dr. Corona for carotid artery stenosis who per his most recent documentation plans on annual ultrasound evaluation along with aggressive risk factor modification  9/12/2022  • CUS: Proximal right internal carotid artery plaque without significant stenosis.  • Right internal carotid artery stenosis of 0-49%.  • Left internal carotid artery stenosis of 50-69%.    Subjective      Review of Systems   Constitutional: Negative for fatigue and fever.   HENT: Negative for trouble swallowing and voice change.    Eyes: Negative for photophobia and visual disturbance.   Respiratory: Negative for cough and shortness of breath.    Cardiovascular: Negative for chest pain and palpitations.   Gastrointestinal: Negative for diarrhea, nausea and vomiting.   Genitourinary: Negative for dysuria and frequency.   Musculoskeletal: Negative for gait problem.   Skin: Negative for rash.   Neurological: Negative for speech difficulty, weakness, light-headedness, numbness and headaches.   Psychiatric/Behavioral: Positive for confusion.          Past Medical History:   Past Medical History:   Diagnosis Date   • Abnormal ECG Ukn   • Anemia    • Anxiety    • Arrhythmia    • Carotid artery occlusion 2021   • Colon polyp 01/16/2014   • Coronary artery disease 04/2021   • Depression    • Diverticulosis    • Dog bite     arms, face, legs   • Dog bite    • Ear piercing    • Elevated cholesterol    • Essential hypertension     patient states she does not have hypertension   • Fibroid     uterine   • GERD (gastroesophageal reflux disease)    • Headache, tension-type    • Heartburn    • Hiatal hernia    • History of nuclear stress test 03/05/2021   • History of transfusion     no adverse reactions    • HL (hearing loss)    • Hx of echocardiogram 03/18/2021   • Hyperlipidemia    • Myocardial infarction    • Osteoarthritis     generalized   • Osteomyelitis    • PONV (postoperative nausea and vomiting)    • Seasonal allergies    • Tinnitus    • Wears glasses     reading only       Past Surgical History:   Past Surgical History:   Procedure Laterality Date   • BLADDER SLING MODIFIED, ANTERIOR AND POSTERIOR VAGINAL REPAIR     • BREAST BIOPSY Bilateral    • CARDIAC CATHETERIZATION N/A 03/18/2021    Procedure: Left Heart Cath;  Surgeon: Ernesto Corona III, MD;  Location:  [facility];  Service: Cardiovascular;  Laterality: N/A;   • CARDIAC CATHETERIZATION N/A 03/18/2021    Procedure: Percutaneous Coronary Intervention;  Surgeon: Fredrick Juares MD;  Location:  [facility];  Service: Cardiovascular;  Laterality: N/A;   • CARDIAC SURGERY  2021   • COLONOSCOPY  01/16/2014   • COLONOSCOPY N/A 07/31/2020    Procedure: COLONOSCOPY WITH BIOPSY AND HOT SNARE POLYPECTOMY;  Surgeon: Juan Coreas MD;  Location: [facility] ENDOSCOPY;  Service: Gastroenterology;  Laterality: N/A;   • COLONOSCOPY N/A 01/25/2021    Procedure: COLONOSCOPY WITH COLD BIOPSY POLYPECTOMY;  Surgeon: Juan Coreas MD;  Location: [facility] ENDOSCOPY;  Service: Gastroenterology;  Laterality: N/A;   • COLONOSCOPY N/A 01/21/2022    Procedure: COLONOSCOPY ;  Surgeon: Juan Coreas MD;  Location: [facility] ENDOSCOPY;  Service: Gastroenterology;  Laterality: N/A;   • CORONARY ARTERY BYPASS GRAFT  04/2021   • ENDOSCOPY N/A 02/19/2020    Procedure: ESOPHAGOGASTRODUODENOSCOPY;  Surgeon: Marito Abdi MD;  Location: [facility] ENDOSCOPY;  Service: Gastroenterology;  Laterality: N/A;   • ENDOSCOPY N/A 06/03/2020    Procedure: ESOPHAGOGASTRODUODENOSCOPY;  Surgeon: Marito Abdi MD;  Location: [facility] ENDOSCOPY;  Service: Gastroenterology;  Laterality: N/A;   • ENDOSCOPY N/A 04/25/2022    Procedure:  ESOPHAGOGASTRODUODENOSCOPY WITH BIOPSY;  Surgeon: Juan Coreas MD;  Location: [facility] ENDOSCOPY;  Service: Gastroenterology;  Laterality: N/A;   • FINGER SURGERY Left     Index finger   • HYSTERECTOMY      still has one ovary on left side   • UPPER GASTROINTESTINAL ENDOSCOPY  2014   • VAGINAL DELIVERY      x3   • WISDOM TOOTH EXTRACTION         Family History:   Family History   Problem Relation Age of Onset   • Lung cancer Mother    • Anxiety disorder Mother    • Lung cancer Father    • Cancer Father    • Prostate cancer Brother         x 3 brothers   • Heart attack Brother         x 2 brothers    • Heart disease Brother    • Colon cancer Neg Hx        Social History:   Social History     Socioeconomic History   • Marital status:    Tobacco Use   • Smoking status: Former     Packs/day: 1.00     Years: 10.00     Pack years: 10.00     Types: Cigarettes     Quit date: 2016     Years since quittin.7     Passive exposure: Never   • Smokeless tobacco: Never   • Tobacco comments:     Intermittent   Vaping Use   • Vaping Use: Never used   Substance and Sexual Activity   • Alcohol use: Yes     Alcohol/week: 3.0 standard drinks     Types: 3 Glasses of wine per week     Comment: social   • Drug use: Never   • Sexual activity: Not Currently     Partners: Male     Birth control/protection: Post-menopausal       Medications:     Current Outpatient Medications:   •  aspirin (aspirin) 81 MG EC tablet, Take 1 tablet by mouth Daily., Disp: 30 tablet, Rfl: 1  •  ezetimibe (ZETIA) 10 MG tablet, Take 1 tablet by mouth Daily., Disp: 90 tablet, Rfl: 3  •  nitroglycerin (Nitrostat) 0.4 MG SL tablet, Place 1 tablet under the tongue Every 5 (Five) Minutes As Needed for Chest Pain. Take no more than 3 doses in 15 minutes., Disp: 12 tablet, Rfl: 12  •  omeprazole (priLOSEC) 40 MG capsule, Take 1 capsule by mouth Daily As Needed (heart burn)., Disp: 30 capsule, Rfl: 2  •  ondansetron ODT (ZOFRAN-ODT) 4 MG  "disintegrating tablet, Place 1 tablet on the tongue 4 (Four) Times a Day As Needed for Nausea., Disp: 20 tablet, Rfl: 0  •  sertraline (ZOLOFT) 100 MG tablet, Take 1 tablet by mouth Daily., Disp: , Rfl:     Allergies:   No Known Allergies      [facility]I Fall Risk Assessment was completed, and patient is at LOW risk for falls.Assessment completed on:4/24/2023    Objective     Objective:    /66   Pulse 66   Ht 156.2 cm (61.5\")   Wt 70.3 kg (155 lb)   LMP  (LMP Unknown)   SpO2 95%   BMI 28.82 kg/m²   Body mass index is 28.82 kg/m².    Physical Exam  Vitals reviewed.   Constitutional:       Appearance: Normal appearance.   HENT:      Head: Normocephalic and atraumatic.      Mouth/Throat:      Mouth: Mucous membranes are moist.      Pharynx: Oropharynx is clear.   Pulmonary:      Effort: Pulmonary effort is normal. No respiratory distress.   Musculoskeletal:      Right lower leg: No edema.      Left lower leg: No edema.   Skin:     General: Skin is warm and dry.   Neurological:      Mental Status: She is alert.          Neurology Exam:    General apperance: NAD.     Mental status: Alert, awake and oriented to time place and person.    Fund of knowledge:  Normal.     Language and Speech: No aphasia or dysarthria.    Naming , Repetition and Comprehension:  Can name objects, repeat a sentence and follow commands. Speech is clear and fluent with good repetition, comprehension, and naming.    Cranial Nerves:   CN II: Visual fields are full. Intact.  Pupils - PERRLA  CN III, IV and VI: Extraocular movements are intact. Normal saccades.   CN V: Facial sensation is intact.   CN VII: Muscles of facial expression reveal no asymmetry. Intact.   CN VIII: Hearing is intact.   CN IX and X: Palate elevates symmetrically. Intact  CN XI: Shoulder shrug is intact.   CN XII: Tongue is midline without evidence of atrophy or fasciculation.     Motor:  Right UE muscle strength 5/5. Normal tone.     Left UE muscle strength 5/5. Normal " tone.      Right LE muscle strength 5/5. Normal tone.     Left LE muscle strength 5/5. Normal tone.      Sensory: Normal light touch sensation bilaterally.    DTRs: 2+ bilaterally in upper and lower extremities.    Coordination: Normal finger-to-nose..    Rhomberg: Negative.    Gait: Normal.    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  [place] today was 25 out of 30, I have ordered screening blood work along with EEG and MRI brain.  After discussion with patient and daughter it was elected to not add any memory enhancing medications today but rather to perform diagnostic testing to further figure out why patient is having these episodes.  I am suspicious that anxiety may also be playing a large factor since she does correlate the episodes of amnesia with anxiety. We will plan to see patient back in clinic in about 6 weeks.      Patient Education:       Reviewed medications, potential side effects and signs and symptoms to report. Discussed risk versus benefits of treatment plan with patient and/or family-including medications, labs and radiology that may be ordered. Addressed questions and concerns during visit. Patient and/or family verbalized understanding and agree with plan. Instructed to call the office with any questions and report to ER with any life-threatening symptoms.     Follow Up:   Return in about 6 weeks (around 6/5/2023).    I spent 45 minutes face to face with the patient and  family. I personally spent 50 percent of this time counseling and discussing diagnosis, diagnostic testing, evaluation, current status, treatment options and management .       During this visit the following were done:  Labs Reviewed []    Labs Ordered [x]    Radiology Reports Reviewed []    Radiology Ordered [x]    PCP Records Reviewed []    Referring Provider Records Reviewed [x]    ER Records Reviewed []    Hospital Records Reviewed []    History Obtained From Family [x]   daughter at visit with patient and assists with describing episodes of amnesia  Radiology Images Reviewed []    Other Reviewed []    Records Requested []      PETTY Navas  [facility]  [address] [address] [phone]

## 2023-06-02 PROBLEM — E78.00 PURE HYPERCHOLESTEROLEMIA: Status: ACTIVE | Noted: 2023-06-02

## 2023-06-02 RX ORDER — FAMOTIDINE 10 MG/ML
20 INJECTION, SOLUTION INTRAVENOUS AS NEEDED
Status: CANCELLED | OUTPATIENT
Start: 2023-06-05

## 2023-06-02 RX ORDER — MEPERIDINE HYDROCHLORIDE 25 MG/ML
25 INJECTION INTRAMUSCULAR; INTRAVENOUS; SUBCUTANEOUS AS NEEDED
Status: CANCELLED | OUTPATIENT
Start: 2023-06-05

## 2023-06-02 RX ORDER — DIPHENHYDRAMINE HYDROCHLORIDE 50 MG/ML
50 INJECTION INTRAMUSCULAR; INTRAVENOUS AS NEEDED
Status: CANCELLED | OUTPATIENT
Start: 2023-06-05

## 2023-06-05 ENCOUNTER — HOSPITAL ENCOUNTER (OUTPATIENT)
Dept: INFUSION THERAPY | Facility: HOSPITAL | Age: 68
Discharge: HOME | End: 2023-06-05
Payer: MEDICARE

## 2023-06-05 VITALS
TEMPERATURE: 97.7 F | SYSTOLIC BLOOD PRESSURE: 109 MMHG | OXYGEN SATURATION: 94 % | HEART RATE: 57 BPM | RESPIRATION RATE: 18 BRPM | DIASTOLIC BLOOD PRESSURE: 69 MMHG

## 2023-06-05 DIAGNOSIS — E78.00 PURE HYPERCHOLESTEROLEMIA: Primary | ICD-10-CM

## 2023-06-05 PROCEDURE — 96372 THER/PROPH/DIAG INJ SC/IM: CPT

## 2023-06-05 PROCEDURE — 25010000002 INCLISIRAN SODIUM 284 MG/1.5ML SOLUTION PREFILLED SYRINGE: Performed by: INTERNAL MEDICINE

## 2023-06-05 RX ORDER — DIPHENHYDRAMINE HYDROCHLORIDE 50 MG/ML
50 INJECTION INTRAMUSCULAR; INTRAVENOUS AS NEEDED
OUTPATIENT
Start: 2023-09-03

## 2023-06-05 RX ORDER — FAMOTIDINE 10 MG/ML
20 INJECTION, SOLUTION INTRAVENOUS AS NEEDED
OUTPATIENT
Start: 2023-09-03

## 2023-06-05 RX ORDER — MEPERIDINE HYDROCHLORIDE 25 MG/ML
25 INJECTION INTRAMUSCULAR; INTRAVENOUS; SUBCUTANEOUS AS NEEDED
OUTPATIENT
Start: 2023-09-03

## 2023-06-05 RX ADMIN — INCLISIRAN 284 MG: 284 INJECTION, SOLUTION SUBCUTANEOUS at 08:58

## 2023-06-13 ENCOUNTER — HOSPITAL ENCOUNTER (OUTPATIENT)
Dept: NEUROLOGY | Facility: HOSPITAL | Age: 68
Discharge: HOME | End: 2023-06-13
Payer: MEDICARE

## 2023-06-13 ENCOUNTER — HOSPITAL ENCOUNTER (OUTPATIENT)
Dept: MRI IMAGING | Facility: HOSPITAL | Age: 68
Discharge: HOME | End: 2023-06-13
Payer: MEDICARE

## 2023-06-13 DIAGNOSIS — G31.84 AMNESTIC MCI (MILD COGNITIVE IMPAIRMENT WITH MEMORY LOSS): ICD-10-CM

## 2023-06-13 PROCEDURE — 95813 EEG EXTND MNTR 61-119 MIN: CPT

## 2023-06-13 PROCEDURE — 70551 MRI BRAIN STEM W/O DYE: CPT

## 2023-06-13 NOTE — PROGRESS NOTES
Please notify patient that I have reviewed her EEG with Dr. Rosales and it does show concern for partial seizures for which I would like to start Keppra 500 mg daily for a week and if well tolerated increase to Keppra 500 mg twice per day. Keppra can cause drowsiness which is why we start it slowly. I would also advise that she not drive for 90 days from last amnestic episode or any loss of consciousness episodes. If patient/daughter are okay with me starting this medication please let me know and I will sent it to their preferred pharmacy, thank you.

## 2023-06-14 ENCOUNTER — TELEPHONE (OUTPATIENT)
Dept: NEUROLOGY | Facility: CLINIC | Age: 68
End: 2023-06-14
Payer: MEDICARE

## 2023-06-14 DIAGNOSIS — R56.9 PARTIAL SEIZURES: Primary | ICD-10-CM

## 2023-06-14 NOTE — TELEPHONE ENCOUNTER
Caller: Krystina Henley    Relationship: Self    Best call back number: [phone]    What test was performed:   MRI AND EEG    When was the test performed:   6-13-23    Where was the test performed:   [facility]    Additional notes: PT WANTING TO BE CALLED TO DISCUSS THE TEST RESULTS. PT IS A LITTLE CONCERNED AS SHE HAS SEEN THE RESULTS ON MY CHART.

## 2023-06-15 RX ORDER — LEVETIRACETAM 500 MG/1
TABLET ORAL
Qty: 67 TABLET | Refills: 2 | Status: SHIPPED | OUTPATIENT
Start: 2023-06-15 | End: 2023-07-22

## 2023-06-15 NOTE — TELEPHONE ENCOUNTER
Message  Received: 2 days ago  Bita Fajardo, Nkechi Epps MA  Please notify patient that I have reviewed her EEG with Dr. Rosales and it does show concern for partial seizures for which I would like to start Keppra 500 mg daily for a week and if well tolerated increase to Keppra 500 mg twice per day. Keppra can cause drowsiness which is why we start it slowly. I would also advise that she not drive for 90 days from last amnestic episode or any loss of consciousness episodes. If patient/daughter are okay with me starting this medication please let me know and I will sent it to their preferred pharmacy, thank you.

## 2023-06-15 NOTE — TELEPHONE ENCOUNTER
Relayed results to Krystina who states understanding and would like Keppra sent to her [facility] pharmacy in [facility]

## 2023-06-15 NOTE — TELEPHONE ENCOUNTER
Bita Fajardo APRN Dieruf, Stephanie S, MA  Please notify patient that I have reviewed her MRI brain with my collaborating neurologist Dr. Rosales and that it shows some volume loss which is appropriate given her age, however there is some atrophy as well in the temporal regions which combined with her EEG and amnesia episodes make me concerned for partial seizures which I have sent message about on EEG regarding starting Keppra for. Please let me know if she has any additional questions or concerns. Thank you.

## 2023-07-24 ENCOUNTER — TELEPHONE (OUTPATIENT)
Dept: CARDIOLOGY | Facility: CLINIC | Age: 68
End: 2023-07-24
Payer: MEDICARE

## 2023-07-24 ENCOUNTER — TELEPHONE (OUTPATIENT)
Dept: INTERNAL MEDICINE | Facility: CLINIC | Age: 68
End: 2023-07-24

## 2023-07-24 RX ORDER — ATORVASTATIN CALCIUM 20 MG/1
20 TABLET, FILM COATED ORAL DAILY
Qty: 30 TABLET | Refills: 3 | Status: SHIPPED | OUTPATIENT
Start: 2023-07-24

## 2023-07-24 NOTE — TELEPHONE ENCOUNTER
Caller: Krystina Henley    Relationship: Self    Best call back number: [phone]     What medications are you currently taking:   Current Outpatient Medications on File Prior to Visit   Medication Sig Dispense Refill    aspirin (aspirin) 81 MG EC tablet Take 1 tablet by mouth Daily. 30 tablet 1    ezetimibe (ZETIA) 10 MG tablet Take 1 tablet by mouth Daily. 90 tablet 3    levETIRAcetam (KEPPRA) 500 MG tablet Take 1 tablet by mouth Daily for 7 days, THEN 1 tablet 2 (Two) Times a Day for 30 days. 67 tablet 2    nitroglycerin (Nitrostat) 0.4 MG SL tablet Place 1 tablet under the tongue Every 5 (Five) Minutes As Needed for Chest Pain. Take no more than 3 doses in 15 minutes. 12 tablet 12    omeprazole (priLOSEC) 40 MG capsule Take 1 capsule by mouth Daily As Needed (heart burn). 30 capsule 2    ondansetron ODT (ZOFRAN-ODT) 4 MG disintegrating tablet Place 1 tablet on the tongue 4 (Four) Times a Day As Needed for Nausea. 20 tablet 0    sertraline (ZOLOFT) 100 MG tablet Take 1 tablet by mouth Daily.       No current facility-administered medications on file prior to visit.          When did you start taking these medications:     Which medication are you concerned about: PLEASE LET ME STOP TAKING EZITEMIBE-IT CAUSES TOO MUCH INFLAMMATION, AND START TAKING ATORVASTATIN TO REPLACE IT.    Who prescribed you this medication: DR NELSON

## 2023-07-24 NOTE — TELEPHONE ENCOUNTER
Spoke with patient, advised that  was the last prescriber of this medication and she would need to reach out to his office about getting it changed. Patient agreeable to this.

## 2023-07-24 NOTE — TELEPHONE ENCOUNTER
Requesting Atorvastatin Rx be sent in.The Zetia is causing inflammation of her joints. Had the first injection of Leqvio but cannot afford the cost of 600.00 out of pocket. Please advise.

## 2023-09-28 ENCOUNTER — OFFICE VISIT (OUTPATIENT)
Dept: NEUROLOGY | Facility: CLINIC | Age: 68
End: 2023-09-28
Payer: MEDICARE

## 2023-09-28 ENCOUNTER — LAB (OUTPATIENT)
Dept: LAB | Facility: HOSPITAL | Age: 68
End: 2023-09-28
Payer: MEDICARE

## 2023-09-28 VITALS
BODY MASS INDEX: 28.52 KG/M2 | HEIGHT: 62 IN | WEIGHT: 155 LBS | SYSTOLIC BLOOD PRESSURE: 118 MMHG | HEART RATE: 68 BPM | OXYGEN SATURATION: 98 % | DIASTOLIC BLOOD PRESSURE: 80 MMHG

## 2023-09-28 DIAGNOSIS — R56.9 PARTIAL SEIZURES: ICD-10-CM

## 2023-09-28 DIAGNOSIS — R56.9 PARTIAL SEIZURES: Primary | ICD-10-CM

## 2023-09-28 DIAGNOSIS — G31.84 AMNESTIC MCI (MILD COGNITIVE IMPAIRMENT WITH MEMORY LOSS): ICD-10-CM

## 2023-09-28 LAB
ALBUMIN SERPL-MCNC: 4.7 G/DL (ref 3.5–5.2)
ALBUMIN/GLOB SERPL: 1.5 G/DL
ALP SERPL-CCNC: 100 U/L (ref 39–117)
ALT SERPL W P-5'-P-CCNC: 21 U/L (ref 1–33)
ANION GAP SERPL CALCULATED.3IONS-SCNC: 10 MMOL/L (ref 5–15)
AST SERPL-CCNC: 28 U/L (ref 1–32)
BILIRUB SERPL-MCNC: 0.2 MG/DL (ref 0–1.2)
BUN SERPL-MCNC: 22 MG/DL (ref 8–23)
BUN/CREAT SERPL: 23.9 (ref 7–25)
CALCIUM SPEC-SCNC: 9.5 MG/DL (ref 8.6–10.5)
CHLORIDE SERPL-SCNC: 104 MMOL/L (ref 98–107)
CHOLEST SERPL-MCNC: 310 MG/DL (ref 0–200)
CO2 SERPL-SCNC: 27 MMOL/L (ref 22–29)
CREAT SERPL-MCNC: 0.92 MG/DL (ref 0.57–1)
EGFRCR SERPLBLD CKD-EPI 2021: 68 ML/MIN/1.73
FOLATE SERPL-MCNC: >20 NG/ML (ref 4.78–24.2)
GLOBULIN UR ELPH-MCNC: 3.2 GM/DL
GLUCOSE SERPL-MCNC: 87 MG/DL (ref 65–99)
HDLC SERPL-MCNC: 94 MG/DL (ref 40–60)
LDLC SERPL CALC-MCNC: 195 MG/DL (ref 0–100)
LDLC/HDLC SERPL: 2.04 {RATIO}
POTASSIUM SERPL-SCNC: 4.1 MMOL/L (ref 3.5–5.2)
PROT SERPL-MCNC: 7.9 G/DL (ref 6–8.5)
RPR SER QL: NORMAL
SODIUM SERPL-SCNC: 141 MMOL/L (ref 136–145)
T4 FREE SERPL-MCNC: 0.92 NG/DL (ref 0.93–1.7)
TRIGL SERPL-MCNC: 122 MG/DL (ref 0–150)
TSH SERPL DL<=0.05 MIU/L-ACNC: 2.93 UIU/ML (ref 0.27–4.2)
VIT B12 BLD-MCNC: 564 PG/ML (ref 211–946)
VLDLC SERPL-MCNC: 21 MG/DL (ref 5–40)

## 2023-09-28 PROCEDURE — 82746 ASSAY OF FOLIC ACID SERUM: CPT

## 2023-09-28 PROCEDURE — 83921 ORGANIC ACID SINGLE QUANT: CPT

## 2023-09-28 PROCEDURE — 80053 COMPREHEN METABOLIC PANEL: CPT

## 2023-09-28 PROCEDURE — 36415 COLL VENOUS BLD VENIPUNCTURE: CPT

## 2023-09-28 PROCEDURE — 80061 LIPID PANEL: CPT | Performed by: INTERNAL MEDICINE

## 2023-09-28 PROCEDURE — 82607 VITAMIN B-12: CPT

## 2023-09-28 PROCEDURE — 84439 ASSAY OF FREE THYROXINE: CPT

## 2023-09-28 PROCEDURE — 86592 SYPHILIS TEST NON-TREP QUAL: CPT

## 2023-09-28 PROCEDURE — 84443 ASSAY THYROID STIM HORMONE: CPT

## 2023-09-28 RX ORDER — LEVETIRACETAM 500 MG/1
500 TABLET ORAL 2 TIMES DAILY
Qty: 60 TABLET | Refills: 2 | Status: SHIPPED | OUTPATIENT
Start: 2023-09-28 | End: 2023-12-27

## 2023-09-28 NOTE — PROGRESS NOTES
Neuro Office Visit      Encounter Date: 2023   Patient Name: Krystina Henley  : 1955   MRN: [id number]   PCP:  Enrico Corona DO     Chief Complaint:    Chief Complaint   Patient presents with    Memory Loss       History of Present Illness: Krystina Henley is a 67 y.o. female who is here today in Neurology for  partial seizures and memory loss.     Initial visit 2023:  Krystina Henley is a 67 y.o. female who is here today in Neurology for episodes of amnesia.  Referred by Dr. Ernesto Corona. She has a past medical history of HTN, HLD, CAD, CABG, conductive hearing loss bilateral ears, tinnitus both ears, vitamin D deficiency, GERD, urinary incontinence, tubulovillous adenoma, anxiety, depression, osteomyelitis of finger of left hand.  She reports that she has had intermittent episodes of amnesia for approximately a year which are often associated with periods of high anxiety.  She tells me that her primary care has started her on Zoloft which significantly helped with the anxiety but then caused tremors so she had the dose decreased and that she is working with primary care to address antianxiety medications.  Her daughter is here with her today and states that when she has the 6 episodes of extreme fear she often will be amnestic surrounding the event.  They recall specific time in 2022 when she was swimming with [employer] and that she cannot remember this day at all, daughter reports about 2-3 of these episodes where she has complete amnesia surrounding an event.  They have also noticed that overall her memory is declining.   She has noted symptoms since at least  marked initially by forgetfulness . This has gradually worsened  over time. Additional symptoms have included impairments in short term memory and learning new things . There have been associated  symptoms of anxiety  and depression . She denies  impairments in ADL's. She manages her medications  and  finances. She continues to drive.  . She is currently residing at home with daughter.      Family History: None  Personal Neurological History: No history of stroke or seizure  Education & Work History: RN  Psychological History: Anxiety and Depression  Sleep Habits & History: Affected by sleep  Chronic Pain: None  Hearing or Vision Impairments: Mild hearing   Personal History of Cancer: None     She follows with Dr. Corona for carotid artery stenosis who per his most recent documentation plans on annual ultrasound evaluation along with aggressive risk factor modification  9/12/2022  CUS: Proximal right internal carotid artery plaque without significant stenosis.  Right internal carotid artery stenosis of 0-49%.  Left internal carotid artery stenosis of 50-69%.      Current visit 9/28/2023:  Krystina Henley returns to neurology clinic for continued evaluation of MCI - MRI brain was performed on 6/13/2023 and showed parenchymal volume loss greatest in the temporal regions, probable chronic small vessel ischemic change.  EEG performed on 6/13/2023 impression stated left anterior temporal sharp waves, per impression sharp waves noted are abnormal but not entirely specific, may be seen in setting of partial seizures.  These findings were reviewed with Dr. Rosales and patient was started on Keppra 500 mg twice daily. TSH 2.580, CMP within normal limits with exception of CO2 29.4, additional lab work pending.  Prior MoCA 25 out of 30.    In clinic today she reports that she is no longer having loss of time episodes, denies any more amnestic events. She denies SE from Keppra 500 mg BID. She feels that her memory has improved. She is not losing time like she was before. She reports that if she misses a dose she notices change in her memory, she is now compliant with Keppra 500 mg BID. She reports minimal to  no alcohol use. No history of closed head injury. No family history of seizures. No history of being born prematurely or  of issues achieving developmental milestones growing up.     Subjective      Review of Systems   Constitutional: Negative.    HENT: Negative.     Eyes: Negative.    Respiratory: Negative.     Cardiovascular: Negative.    Gastrointestinal: Negative.    Endocrine: Negative.    Genitourinary: Negative.    Musculoskeletal: Negative.    Skin: Negative.    Allergic/Immunologic: Negative.    Neurological:         Reports that since consistently taking Keppra she is no longer having amnestic periods   Psychiatric/Behavioral:  The patient is nervous/anxious.         Past Medical History:   Past Medical History:   Diagnosis Date    Abnormal ECG Ukn    Anemia     Anxiety     Arrhythmia     Carotid artery occlusion 2021    Colon polyp 01/16/2014    Coronary artery disease 04/2021    Depression     Diverticulosis     Dog bite     arms, face, legs    Dog bite     Ear piercing     Elevated cholesterol     Essential hypertension     patient states she does not have hypertension    Fibroid     uterine    GERD (gastroesophageal reflux disease)     Headache, tension-type     Heartburn     Hiatal hernia     History of nuclear stress test 03/05/2021    History of transfusion     no adverse reactions    HL (hearing loss)     Hx of echocardiogram 03/18/2021    Hyperlipidemia     Myocardial infarction [place]    Osteoarthritis     generalized    Osteomyelitis     PONV (postoperative nausea and vomiting)     Seasonal allergies     Tinnitus     Wears glasses     reading only       Past Surgical History:   Past Surgical History:   Procedure Laterality Date    BLADDER SLING MODIFIED, ANTERIOR AND POSTERIOR VAGINAL REPAIR      BREAST BIOPSY Bilateral     CARDIAC CATHETERIZATION N/A 03/18/2021    Procedure: Left Heart Cath;  Surgeon: Ernesto Corona III, MD;  Location: [facility] CATH INVASIVE LOCATION;  Service: Cardiovascular;  Laterality: N/A;    CARDIAC CATHETERIZATION N/A 03/18/2021    Procedure: Percutaneous Coronary Intervention;  Surgeon: Mor  Fredrick HERNANDEZ MD;  Location: [facility] INVASIVE LOCATION;  Service: Cardiovascular;  Laterality: N/A;    CARDIAC SURGERY  2021    COLONOSCOPY  01/16/2014    COLONOSCOPY N/A 07/31/2020    Procedure: COLONOSCOPY WITH BIOPSY AND HOT SNARE POLYPECTOMY;  Surgeon: Juan Coreas MD;  Location: [facility] ENDOSCOPY;  Service: Gastroenterology;  Laterality: N/A;    COLONOSCOPY N/A 01/25/2021    Procedure: COLONOSCOPY WITH COLD BIOPSY POLYPECTOMY;  Surgeon: Juan Coreas MD;  Location: [facility] ENDOSCOPY;  Service: Gastroenterology;  Laterality: N/A;    COLONOSCOPY N/A 01/21/2022    Procedure: COLONOSCOPY ;  Surgeon: Juan Coreas MD;  Location: [facility] ENDOSCOPY;  Service: Gastroenterology;  Laterality: N/A;    CORONARY ARTERY BYPASS GRAFT  04/2021    ENDOSCOPY N/A 02/19/2020    Procedure: ESOPHAGOGASTRODUODENOSCOPY;  Surgeon: Marito Abdi MD;  Location: [facility] ENDOSCOPY;  Service: Gastroenterology;  Laterality: N/A;    ENDOSCOPY N/A 06/03/2020    Procedure: ESOPHAGOGASTRODUODENOSCOPY;  Surgeon: Marito Abdi MD;  Location: [facility] ENDOSCOPY;  Service: Gastroenterology;  Laterality: N/A;    ENDOSCOPY N/A 04/25/2022    Procedure: ESOPHAGOGASTRODUODENOSCOPY WITH BIOPSY;  Surgeon: Juan Coreas MD;  Location: [facility] ENDOSCOPY;  Service: Gastroenterology;  Laterality: N/A;    FINGER SURGERY Left     Index finger    HYSTERECTOMY      still has one ovary on left side    UPPER GASTROINTESTINAL ENDOSCOPY  03/13/2014    VAGINAL DELIVERY      x3    WISDOM TOOTH EXTRACTION         Family History:   Family History   Problem Relation Age of Onset    Lung cancer Mother     Anxiety disorder Mother     Lung cancer Father     Cancer Father     Prostate cancer Brother         x 3 brothers    Heart attack Brother         x 2 brothers     Heart disease Brother     Colon cancer Neg Hx        Social History:   Social History     Socioeconomic History    Marital status:    Tobacco Use    Smoking status: Former  "    Packs/day: 1.00     Years: 10.00     Pack years: 10.00     Types: Cigarettes     Quit date: 2016     Years since quittin.2     Passive exposure: Never    Smokeless tobacco: Never    Tobacco comments:     Intermittent   Vaping Use    Vaping Use: Never used   Substance and Sexual Activity    Alcohol use: Yes     Alcohol/week: 3.0 standard drinks     Types: 3 Glasses of wine per week     Comment: social    Drug use: Never    Sexual activity: Not Currently     Partners: Male     Birth control/protection: Post-menopausal       Medications:     Current Outpatient Medications:     aspirin (aspirin) 81 MG EC tablet, Take 1 tablet by mouth Daily., Disp: 30 tablet, Rfl: 1    atorvastatin (LIPITOR) 20 MG tablet, Take 1 tablet by mouth Daily., Disp: 30 tablet, Rfl: 3    levETIRAcetam (KEPPRA) 500 MG tablet, Take 1 tablet by mouth 2 (Two) Times a Day for 90 days., Disp: 60 tablet, Rfl: 2    nitroglycerin (Nitrostat) 0.4 MG SL tablet, Place 1 tablet under the tongue Every 5 (Five) Minutes As Needed for Chest Pain. Take no more than 3 doses in 15 minutes., Disp: 12 tablet, Rfl: 12    omeprazole (priLOSEC) 40 MG capsule, Take 1 capsule by mouth Daily As Needed (heart burn)., Disp: 30 capsule, Rfl: 2    ondansetron ODT (ZOFRAN-ODT) 4 MG disintegrating tablet, Place 1 tablet on the tongue 4 (Four) Times a Day As Needed for Nausea., Disp: 20 tablet, Rfl: 0    sertraline (ZOLOFT) 100 MG tablet, Take 1 tablet by mouth 2 (Two) Times a Day., Disp: , Rfl:     Allergies:   No Known Allergies      STEADI Fall Risk Assessment was completed, and patient is at LOW risk for falls.Assessment completed on:2023    Objective     Objective:    /80   Pulse 68   Ht 156.2 cm (61.5\")   Wt 70.3 kg (155 lb)   LMP  (LMP Unknown)   SpO2 98%   BMI 28.81 kg/m²   Body mass index is 28.81 kg/m².    Physical Exam  Vitals reviewed.   Constitutional:       Appearance: Normal appearance.   HENT:      Head: Normocephalic and atraumatic. "      Mouth/Throat:      Mouth: Mucous membranes are moist.      Pharynx: Oropharynx is clear.   Pulmonary:      Effort: Pulmonary effort is normal. No respiratory distress.   Musculoskeletal:      Right lower leg: No edema.      Left lower leg: No edema.   Skin:     General: Skin is warm and dry.   Neurological:      Mental Status: She is alert.        Neurology Exam:    General apperance: NAD.     Mental status: Alert, awake and oriented to time place and person.    Fund of knowledge:  Normal.     Language and Speech: No aphasia or dysarthria.    Naming , Repetition and Comprehension:  Can name objects, repeat a sentence and follow commands. Speech is clear and fluent with good repetition, comprehension, and naming.    Cranial Nerves:   CN II: Visual fields are full. Intact. Pupils - PERRLA  CN III, IV and VI: Extraocular movements are intact. Normal saccades.   CN V: Facial sensation is intact.   CN VII: Muscles of facial expression reveal no asymmetry. Intact.   CN VIII: Hearing is intact.   CN IX and X: Palate elevates symmetrically. Intact  CN XI: Shoulder shrug is intact.   CN XII: Tongue is midline without evidence of atrophy or fasciculation.     Motor:  Right UE muscle strength 5/5. Normal tone.     Left UE muscle strength 5/5. Normal tone.      Right LE muscle strength 5/5. Normal tone.     Left LE muscle strength 5/5. Normal tone.      Sensory: Normal light touch and vibration sensation bilaterally.    DTRs: 2+ bilaterally in upper and lower extremities.    Coordination: Normal finger-to-nose, heel to shin B/L.    Rhomberg: Negative.    Gait: Normal.     MOCA 26/30 with 2/5 for word recall    Results:   Imaging:   EEG    Result Date: 6/13/2023  The sharp waves noted above are abnormal but not entirely specific.  These may however be seen in the setting of seizure disorders of the partial type, and clinical correlation is suggested This report is transcribed using the Dragon dictation system.      MRI  Brain Without Contrast    Result Date: 6/13/2023  Impression: 1. Parenchymal volume loss, greatest in the temporal regions. 2. Probable chronic small vessel ischemic change. Electronically Signed: Andrew Sagastume  6/13/2023 3:29 PM EDT  Workstation ID: [id number]       Labs:   Lab Results   Component Value Date    GLUCOSE 88 04/24/2023    BUN 18 04/24/2023    CREATININE 0.82 04/24/2023    EGFRRESULT 78.0 04/24/2023    EGFR 79.7 03/28/2022    BCR 22.0 04/24/2023    K 4.9 04/24/2023    CO2 29.4 (H) 04/24/2023    CALCIUM 9.7 04/24/2023    PROTENTOTREF 7.3 04/24/2023    ALBUMIN 4.5 04/24/2023    BILITOT <0.2 04/24/2023    AST 16 04/24/2023    ALT 14 04/24/2023         Assessment / Plan      Assessment/Plan:   Diagnoses and all orders for this visit:    1. Partial seizures (Primary)  -     levETIRAcetam (KEPPRA) 500 MG tablet; Take 1 tablet by mouth 2 (Two) Times a Day for 90 days.  Dispense: 60 tablet; Refill: 2  -     Vitamin B12 & Folate; Future  -     Methylmalonic Acid, Serum; Future  -     RPR; Future       Krystina Lory is in neurology clinic for continued evaluation of amnestic periods. MRI brain was performed on 6/13/2023 and showed parenchymal volume loss greatest in the temporal regions, probable chronic small vessel ischemic change.  EEG performed on 6/13/2023 impression stated left anterior temporal sharp waves, per impression sharp waves noted are abnormal but not entirely specific, may be seen in setting of partial seizures.  These findings were reviewed with Dr. Rosales and patient was started on Keppra 500 mg twice daily for partial seizures. She has greatly improved since starting Keppra. Will continue Keppra 500 mg BID. I have advised patient to take medication the same time each day, to try to maintain good sleep/wake cycles, no alcohol use. MOCA today 26/30 - will collect remaining memory labs today as well. Will plan to see back in clinic in 3 months or sooner for any questions or concerns.     Patient  Education:       Reviewed medications, potential side effects and signs and symptoms to report. Discussed risk versus benefits of treatment plan with patient and/or family-including medications, labs and radiology that may be ordered. Addressed questions and concerns during visit. Patient and/or family verbalized understanding and agree with plan. Instructed to call the office with any questions and report to ER with any life-threatening symptoms.     Follow Up:   Return in about 3 months (around 12/28/2023).    I spent 45 minutes face to face with the patient. I personally spent 50 percent of this time counseling and discussing diagnosis, diagnostic testing, evaluation, treatment options, and management .       During this visit the following were done:  Labs Reviewed [x]    Labs Ordered [x]    Radiology Reports Reviewed [x]    Radiology Ordered []    PCP Records Reviewed []    Referring Provider Records Reviewed []    ER Records Reviewed []    Hospital Records Reviewed []    History Obtained From Family []    Radiology Images Reviewed [x]    Other Reviewed []    Records Requested []      PETTY Navas  [facility]  [address] [address] [phone]

## 2023-09-29 ENCOUNTER — TELEPHONE (OUTPATIENT)
Dept: NEUROLOGY | Facility: CLINIC | Age: 68
End: 2023-09-29
Payer: MEDICARE

## 2023-09-29 NOTE — TELEPHONE ENCOUNTER
----- Message from PETTY Navas sent at 9/28/2023  4:25 PM EDT -----  Please notify Krystina that her vitamin B12 was 564 which is normal, folate level normal, CMP was normal,  Free t4 was slightly below normal but TSH was normal - please send this to PCP for further follow up.

## 2023-10-03 LAB — METHYLMALONATE SERPL-SCNC: 188 NMOL/L (ref 0–378)

## 2023-10-04 ENCOUNTER — TELEPHONE (OUTPATIENT)
Dept: NEUROLOGY | Facility: CLINIC | Age: 68
End: 2023-10-04
Payer: MEDICARE

## 2023-10-04 NOTE — TELEPHONE ENCOUNTER
----- Message from PETTY Jacobson sent at 10/4/2023  8:19 AM EDT -----  Methylmalonic Acid level was normal.

## 2023-10-23 ENCOUNTER — OFFICE VISIT (OUTPATIENT)
Dept: CARDIOLOGY | Facility: CLINIC | Age: 68
End: 2023-10-23
Payer: MEDICARE

## 2023-10-23 VITALS
WEIGHT: 164 LBS | DIASTOLIC BLOOD PRESSURE: 86 MMHG | HEART RATE: 66 BPM | BODY MASS INDEX: 30.96 KG/M2 | SYSTOLIC BLOOD PRESSURE: 132 MMHG | HEIGHT: 61 IN | OXYGEN SATURATION: 99 %

## 2023-10-23 DIAGNOSIS — E78.2 MIXED HYPERLIPIDEMIA: ICD-10-CM

## 2023-10-23 DIAGNOSIS — I10 ESSENTIAL HYPERTENSION: ICD-10-CM

## 2023-10-23 DIAGNOSIS — I25.119 CORONARY ARTERY DISEASE INVOLVING NATIVE CORONARY ARTERY OF NATIVE HEART WITH ANGINA PECTORIS: Primary | ICD-10-CM

## 2023-10-23 DIAGNOSIS — I65.23 BILATERAL CAROTID ARTERY STENOSIS: ICD-10-CM

## 2023-10-23 PROCEDURE — 3079F DIAST BP 80-89 MM HG: CPT | Performed by: INTERNAL MEDICINE

## 2023-10-23 PROCEDURE — 3075F SYST BP GE 130 - 139MM HG: CPT | Performed by: INTERNAL MEDICINE

## 2023-10-23 PROCEDURE — 99214 OFFICE O/P EST MOD 30 MIN: CPT | Performed by: INTERNAL MEDICINE

## 2023-10-23 NOTE — PROGRESS NOTES
[facility]  Office visit  Krystina Henley  1955  [phone]  There is no work phone number on file.    VISIT DATE:  10/23/2023    PCP: Enrico Corona DO  [address]  [facility] [address]    CC:  Chief Complaint   Patient presents with    Coronary Artery Disease     Coronary artery disease involving native coronary artery of native heart with angina pectoris           Previous cardiac studies and procedures:  March 2021   Anai scan myocardial perfusion imaging  Left ventricular ejection fraction is hyperdynamic (Calculated EF > 70%). .  Myocardial perfusion imaging indicates a large-sized, severe area of ischemia located in the anterior wall, apex and septal wall.  Impressions are consistent with a high risk study.  Findings consistent with an abnormal ECG stress test.  Cardiac catheterization:  90 to 95% ostial LAD stenosis.  Normal LVEDP  No aortic stenosis  Bilateral carotid duplex  Left internal carotid artery stenosis of 50-69%.  Proximal right internal carotid artery plaque without significant stenosis.  Right internal carotid artery stenosis of 0-49%.  Transthoracic echocardiogram  Left ventricular ejection fraction appears to be 66 - 70%. Left ventricular systolic function is normal.  Left ventricular diastolic function is consistent with (grade I) impaired relaxation.  Estimated right ventricular systolic pressure from tricuspid regurgitation is normal (<35 mmHg).    April 2021: MIDCAB STONE to LAD.    September 2022 carotid duplex  Right internal carotid artery stenosis of 0-49%.  Left internal carotid artery stenosis of 50-69%.    October 2022 TTE  Left ventricular ejection fraction appears to be 61 - 65%. Left ventricular systolic function is normal.  Left ventricular diastolic function is consistent with (grade I) impaired relaxation.    ASSESSMENT:   Diagnosis Plan   1. Coronary artery disease involving native coronary artery of native heart with  angina pectoris        2. Essential hypertension        3. Mixed hyperlipidemia        4. Bilateral carotid artery stenosis  Duplex Carotid Ultrasound CAR          PLAN:  Coronary artery disease: Currently stable and asymptomatic.  Continue aspirin 81 mg p.o. daily.  Afterload well controlled.      Hyperlipidemia: Goal LDL less than 70.  Intolerant to simvastatin, rosuvastatin, and lovastatin due to arthralgias.  Continue Zetia.    PCSK9 inhibitor cost prohibitive.  Leqvio previously was going to be $700 an injection.  Has agreed to take atorvastatin on a regular basis with repeat lipid profile pending    Carotid atherosclerosis: Mild right internal carotid stenosis, moderate left internal carotid artery stenosis.  Annual ultrasound evaluation..  Continue aggressive risk factor modification.      Subjective  Interval assessment: Blood pressures running less than 130/80 mmHg.  Has not taken the atorvastatin a little over a month.  Otherwise compliant with medical therapy.  Leqvio was previously cost prohibitive, was going to cost about $700 per injection.    Initial evaluation: 65-year-old female with a history of familial hyperlipidemia and family history of early coronary disease presents with gradually progressive exertional chest discomfort.  Describes low precordial chest discomfort radiating to her back and shoulders and arms with such activities as walking up an incline.  The symptoms have occurred consistently over the previous 2 years.  She has been self-medicating with nitroglycerin, she will walk until she develops limiting chest discomfort and then stop and take a sublingual nitroglycerin until her pain resolves.  She reports that she can walk about 1/8 of a mile on level ground before sprinting limiting chest discomfort but experiences chest discomfort which is limiting she encounters any sort of incline.  She has previously been intolerant to atorvastatin and simvastatin with regard to myalgias.   "Previously prescribed rosuvastatin which she has not started taking yet.     PHYSICAL EXAMINATION:  Vitals:    10/23/23 1036   BP: 132/86   BP Location: Right arm   Patient Position: Sitting   Cuff Size: Adult   Pulse: 66   SpO2: 99%   Weight: 74.4 kg (164 lb)   Height: 154.9 cm (61\")       General Appearance:    Alert, cooperative, no distress, appears stated age   Head:    Normocephalic, without obvious abnormality, atraumatic   Eyes:    conjunctiva/corneas clear   Nose:   Nares normal, septum midline, mucosa normal, no drainage   Throat:   Lips, teeth and gums normal   Neck:   Supple, symmetrical, trachea midline, no carotid    bruit or JVD   Lungs:     Clear to auscultation bilaterally, respirations unlabored   Chest Wall:    No tenderness or deformity    Heart:    Regular rate and rhythm, S1 and S2 normal, no murmur, rub   or gallop, normal carotid impulse bilaterally without bruit.                       Diagnostic Data:  Procedures  Lab Results   Component Value Date    CHLPL 325 (H) 04/24/2023    TRIG 122 09/28/2023    HDL 94 (H) 09/28/2023     Lab Results   Component Value Date    GLUCOSE 87 09/28/2023    BUN 22 09/28/2023    CREATININE 0.92 09/28/2023     09/28/2023    K 4.1 09/28/2023     09/28/2023    CO2 27.0 09/28/2023     No results found for: \"HGBA1C\"  Lab Results   Component Value Date    WBC 6.21 04/24/2023    HGB 13.3 04/24/2023    HCT 39.5 04/24/2023     04/24/2023       Allergies  No Known Allergies    Current Medications    Current Outpatient Medications:     aspirin (aspirin) 81 MG EC tablet, Take 1 tablet by mouth Daily., Disp: 30 tablet, Rfl: 1    atorvastatin (LIPITOR) 20 MG tablet, Take 1 tablet by mouth Daily., Disp: 30 tablet, Rfl: 3    levETIRAcetam (KEPPRA) 500 MG tablet, Take 1 tablet by mouth 2 (Two) Times a Day for 90 days., Disp: 60 tablet, Rfl: 2    nitroglycerin (Nitrostat) 0.4 MG SL tablet, Place 1 tablet under the tongue Every 5 (Five) Minutes As Needed for " Chest Pain. Take no more than 3 doses in 15 minutes., Disp: 12 tablet, Rfl: 12    omeprazole (priLOSEC) 40 MG capsule, Take 1 capsule by mouth Daily As Needed (heart burn)., Disp: 30 capsule, Rfl: 2    ondansetron ODT (ZOFRAN-ODT) 4 MG disintegrating tablet, Place 1 tablet on the tongue 4 (Four) Times a Day As Needed for Nausea., Disp: 20 tablet, Rfl: 0    sertraline (ZOLOFT) 100 MG tablet, Take 1 tablet by mouth 2 (Two) Times a Day., Disp: , Rfl:     Diclofenac Sodium (Voltaren Arthritis Pain) 1 % gel gel, Apply 4 g topically to the appropriate area as directed 4 (Four) Times a Day As Needed (joint pain)., Disp: 100 g, Rfl: 3          ROS  ROS      SOCIAL HX  Social History     Socioeconomic History    Marital status:    Tobacco Use    Smoking status: Former     Packs/day: 1.00     Years: 10.00     Additional pack years: 0.00     Total pack years: 10.00     Types: Cigarettes     Quit date: 2016     Years since quittin.2     Passive exposure: Never    Smokeless tobacco: Never    Tobacco comments:     Intermittent   Vaping Use    Vaping Use: Never used   Substance and Sexual Activity    Alcohol use: Yes     Alcohol/week: 3.0 standard drinks of alcohol     Types: 3 Glasses of wine per week     Comment: social    Drug use: Never    Sexual activity: Not Currently     Partners: Male     Birth control/protection: Post-menopausal       FAMILY HX  Family History   Problem Relation Age of Onset    Lung cancer Mother     Anxiety disorder Mother     Lung cancer Father     Cancer Father     Prostate cancer Brother         x 3 brothers    Heart attack Brother         x 2 brothers     Heart disease Brother     Colon cancer Neg Hx              Ernesto Corona III, MD, FACC

## 2023-12-01 DIAGNOSIS — K21.9 GASTROESOPHAGEAL REFLUX DISEASE WITHOUT ESOPHAGITIS: ICD-10-CM

## 2023-12-02 RX ORDER — OMEPRAZOLE 40 MG/1
CAPSULE, DELAYED RELEASE ORAL
Qty: 30 CAPSULE | Refills: 0 | Status: SHIPPED | OUTPATIENT
Start: 2023-12-02

## 2023-12-08 ENCOUNTER — HOSPITAL ENCOUNTER (OUTPATIENT)
Dept: CARDIOLOGY | Facility: HOSPITAL | Age: 68
Discharge: HOME | End: 2023-12-08
Payer: MEDICARE

## 2023-12-08 ENCOUNTER — TELEPHONE (OUTPATIENT)
Dept: CARDIOLOGY | Facility: CLINIC | Age: 68
End: 2023-12-08

## 2023-12-08 VITALS — BODY MASS INDEX: 30.96 KG/M2 | HEIGHT: 61 IN | WEIGHT: 164 LBS

## 2023-12-08 DIAGNOSIS — I65.23 BILATERAL CAROTID ARTERY STENOSIS: ICD-10-CM

## 2023-12-08 LAB
BH CV XLRA MEAS LEFT DIST CCA EDV: 21.9 CM/SEC
BH CV XLRA MEAS LEFT DIST CCA PSV: 64.9 CM/SEC
BH CV XLRA MEAS LEFT DIST ICA EDV: 33.9 CM/SEC
BH CV XLRA MEAS LEFT DIST ICA PSV: 82.1 CM/SEC
BH CV XLRA MEAS LEFT ICA/CCA RATIO: 2.37
BH CV XLRA MEAS LEFT MID CCA EDV: 20.2 CM/SEC
BH CV XLRA MEAS LEFT MID CCA PSV: 68.8 CM/SEC
BH CV XLRA MEAS LEFT MID ICA EDV: 26.7 CM/SEC
BH CV XLRA MEAS LEFT MID ICA PSV: 75.9 CM/SEC
BH CV XLRA MEAS LEFT PROX CCA EDV: 24.4 CM/SEC
BH CV XLRA MEAS LEFT PROX CCA PSV: 128 CM/SEC
BH CV XLRA MEAS LEFT PROX ECA EDV: 29 CM/SEC
BH CV XLRA MEAS LEFT PROX ECA PSV: 147 CM/SEC
BH CV XLRA MEAS LEFT PROX ICA EDV: 48.9 CM/SEC
BH CV XLRA MEAS LEFT PROX ICA PSV: 154 CM/SEC
BH CV XLRA MEAS LEFT PROX SCLA PSV: 228 CM/SEC
BH CV XLRA MEAS LEFT VERTEBRAL A EDV: 12.9 CM/SEC
BH CV XLRA MEAS LEFT VERTEBRAL A PSV: 45.1 CM/SEC
BH CV XLRA MEAS RIGHT DIST CCA EDV: 31.7 CM/SEC
BH CV XLRA MEAS RIGHT DIST CCA PSV: 87 CM/SEC
BH CV XLRA MEAS RIGHT DIST ICA EDV: 23.6 CM/SEC
BH CV XLRA MEAS RIGHT DIST ICA PSV: 64.9 CM/SEC
BH CV XLRA MEAS RIGHT ICA/CCA RATIO: 1.01
BH CV XLRA MEAS RIGHT MID CCA EDV: 19.9 CM/SEC
BH CV XLRA MEAS RIGHT MID CCA PSV: 90.1 CM/SEC
BH CV XLRA MEAS RIGHT MID ICA EDV: 25.4 CM/SEC
BH CV XLRA MEAS RIGHT MID ICA PSV: 60.5 CM/SEC
BH CV XLRA MEAS RIGHT PROX CCA EDV: 13.1 CM/SEC
BH CV XLRA MEAS RIGHT PROX CCA PSV: 112 CM/SEC
BH CV XLRA MEAS RIGHT PROX ECA EDV: 16.2 CM/SEC
BH CV XLRA MEAS RIGHT PROX ECA PSV: 75 CM/SEC
BH CV XLRA MEAS RIGHT PROX ICA EDV: 31.7 CM/SEC
BH CV XLRA MEAS RIGHT PROX ICA PSV: 88.1 CM/SEC
BH CV XLRA MEAS RIGHT PROX SCLA PSV: 167 CM/SEC
BH CV XLRA MEAS RIGHT VERTEBRAL A EDV: 21.5 CM/SEC
BH CV XLRA MEAS RIGHT VERTEBRAL A PSV: 86.4 CM/SEC
LEFT ARM BP: NORMAL MMHG
RIGHT ARM BP: NORMAL MMHG

## 2023-12-08 PROCEDURE — 93880 EXTRACRANIAL BILAT STUDY: CPT

## 2023-12-08 NOTE — TELEPHONE ENCOUNTER
----- Message from Ernesto Corona III, MD sent at 12/8/2023 10:21 AM EST -----  Carotid arteries are stable compared to previous studies.

## 2023-12-22 RX ORDER — ATORVASTATIN CALCIUM 20 MG/1
20 TABLET, FILM COATED ORAL DAILY
Qty: 30 TABLET | Refills: 3 | Status: SHIPPED | OUTPATIENT
Start: 2023-12-22

## 2023-12-22 NOTE — TELEPHONE ENCOUNTER
Lab Results   Component Value Date    CHOL 310 (H) 09/28/2023    CHLPL 325 (H) 04/24/2023    TRIG 122 09/28/2023    HDL 94 (H) 09/28/2023     (H) 09/28/2023     Lab Results   Component Value Date    GLUCOSE 87 09/28/2023    BUN 22 09/28/2023    CREATININE 0.92 09/28/2023    EGFRRESULT 78.0 04/24/2023    EGFR 68.0 09/28/2023    BCR 23.9 09/28/2023    K 4.1 09/28/2023    CO2 27.0 09/28/2023    CALCIUM 9.5 09/28/2023    PROTENTOTREF 7.3 04/24/2023    ALBUMIN 4.7 09/28/2023    BILITOT 0.2 09/28/2023    AST 28 09/28/2023    ALT 21 09/28/2023

## 2024-01-12 DIAGNOSIS — K21.9 GASTROESOPHAGEAL REFLUX DISEASE WITHOUT ESOPHAGITIS: ICD-10-CM

## 2024-01-12 RX ORDER — OMEPRAZOLE 40 MG/1
40 CAPSULE, DELAYED RELEASE ORAL DAILY
Qty: 30 CAPSULE | Refills: 0 | Status: SHIPPED | OUTPATIENT
Start: 2024-01-12

## 2024-02-08 DIAGNOSIS — K21.9 GASTROESOPHAGEAL REFLUX DISEASE WITHOUT ESOPHAGITIS: ICD-10-CM

## 2024-02-08 RX ORDER — OMEPRAZOLE 40 MG/1
CAPSULE, DELAYED RELEASE ORAL
Qty: 30 CAPSULE | Refills: 0 | Status: SHIPPED | OUTPATIENT
Start: 2024-02-08

## 2024-02-08 NOTE — TELEPHONE ENCOUNTER
Rx Refill Note  Requested Prescriptions     Pending Prescriptions Disp Refills    omeprazole (priLOSEC) 40 MG capsule [Pharmacy Med Name: Omeprazole Oral Capsule Delayed Release 40 MG] 30 capsule 0     Sig: TAKE 1 CAPSULE BY MOUTH EVERY DAY. APPOINTMENT NEEDED.      Last office visit with prescribing clinician: Visit date not found   Last telemedicine visit with prescribing clinician: Visit date not found   Next office visit with prescribing clinician: Visit date not found     Igor Blair MA  02/08/24, 15:52 EST

## 2024-02-15 ENCOUNTER — TELEPHONE (OUTPATIENT)
Dept: NEUROLOGY | Facility: CLINIC | Age: 69
End: 2024-02-15
Payer: MEDICARE

## 2024-02-18 DIAGNOSIS — R56.9 PARTIAL SEIZURES: ICD-10-CM

## 2024-02-19 ENCOUNTER — OFFICE VISIT (OUTPATIENT)
Dept: NEUROLOGY | Facility: CLINIC | Age: 69
End: 2024-02-19
Payer: MEDICARE

## 2024-02-19 VITALS
DIASTOLIC BLOOD PRESSURE: 68 MMHG | HEART RATE: 68 BPM | HEIGHT: 61 IN | BODY MASS INDEX: 30.96 KG/M2 | WEIGHT: 164 LBS | SYSTOLIC BLOOD PRESSURE: 110 MMHG | OXYGEN SATURATION: 96 %

## 2024-02-19 DIAGNOSIS — K21.9 GASTROESOPHAGEAL REFLUX DISEASE WITHOUT ESOPHAGITIS: ICD-10-CM

## 2024-02-19 DIAGNOSIS — R56.9 PARTIAL SEIZURES: ICD-10-CM

## 2024-02-19 PROCEDURE — 3078F DIAST BP <80 MM HG: CPT

## 2024-02-19 PROCEDURE — 3074F SYST BP LT 130 MM HG: CPT

## 2024-02-19 PROCEDURE — 99213 OFFICE O/P EST LOW 20 MIN: CPT

## 2024-02-19 PROCEDURE — 1159F MED LIST DOCD IN RCRD: CPT

## 2024-02-19 PROCEDURE — 1160F RVW MEDS BY RX/DR IN RCRD: CPT

## 2024-02-19 RX ORDER — LEVETIRACETAM 500 MG/1
500 TABLET ORAL 2 TIMES DAILY
Qty: 60 TABLET | Refills: 0 | OUTPATIENT
Start: 2024-02-19

## 2024-02-19 RX ORDER — LEVETIRACETAM 500 MG/1
500 TABLET ORAL 2 TIMES DAILY
Qty: 180 TABLET | Refills: 1 | Status: SHIPPED | OUTPATIENT
Start: 2024-02-19 | End: 2024-08-17

## 2024-02-19 NOTE — TELEPHONE ENCOUNTER
Rx Refill Note  Requested Prescriptions     Pending Prescriptions Disp Refills    levETIRAcetam (KEPPRA) 500 MG tablet [Pharmacy Med Name: levETIRAcetam Oral Tablet 500 MG] 60 tablet 0     Sig: TAKE 1 TABLET BY MOUTH 2 TIMES A DAY      Last filled:9/28/23 2 refill  Last office visit with prescribing clinician: 9/28/2023      Next office visit with prescribing clinician: 2/19/2024     RONA FITCH  02/19/24, 09:41 EST    Pending to provider for appt today.

## 2024-02-19 NOTE — PROGRESS NOTES
Neuro Office Visit      Encounter Date: 2024   Patient Name: Krystina Henley  : 1955   MRN: [id number]   PCP:  Enrico Corona DO     Chief Complaint:    Chief Complaint   Patient presents with    partial seizures       History of Present Illness: Krystina Henley is a 68 y.o. female who is here today in Neurology for  partial seizures      Initial visit 2023:  Krystina Henley is a 67 y.o. female who is here today in Neurology for episodes of amnesia.  Referred by Dr. Ernesto Corona. She has a past medical history of HTN, HLD, CAD, CABG, conductive hearing loss bilateral ears, tinnitus both ears, vitamin D deficiency, GERD, urinary incontinence, tubulovillous adenoma, anxiety, depression, osteomyelitis of finger of left hand.  She reports that she has had intermittent episodes of amnesia for approximately a year which are often associated with periods of high anxiety.  She tells me that her primary care has started her on Zoloft which significantly helped with the anxiety but then caused tremors so she had the dose decreased and that she is working with primary care to address antianxiety medications.  Her daughter is here with her today and states that when she has the 6 episodes of extreme fear she often will be amnestic surrounding the event.  They recall specific time in 2022 when she was swimming with [employer] and that she cannot remember this day at all, daughter reports about 2-3 of these episodes where she has complete amnesia surrounding an event.  They have also noticed that overall her memory is declining.   She has noted symptoms since at least  marked initially by forgetfulness . This has gradually worsened  over time. Additional symptoms have included impairments in short term memory and learning new things . There have been associated  symptoms of anxiety  and depression . She denies  impairments in ADL's. She manages her medications  and finances. She  continues to drive.  . She is currently residing at home with daughter.      Family History: None  Personal Neurological History: No history of stroke or seizure  Education & Work History: RN  Psychological History: Anxiety and Depression  Sleep Habits & History: Affected by sleep  Chronic Pain: None  Hearing or Vision Impairments: Mild hearing   Personal History of Cancer: None     She follows with Dr. Corona for carotid artery stenosis who per his most recent documentation plans on annual ultrasound evaluation along with aggressive risk factor modification  9/12/2022  CUS: Proximal right internal carotid artery plaque without significant stenosis.  Right internal carotid artery stenosis of 0-49%.  Left internal carotid artery stenosis of 50-69%.        Follow up  visit 9/28/2023:  Krystina Henley returns to neurology clinic for continued evaluation of MCI - MRI brain was performed on 6/13/2023 and showed parenchymal volume loss greatest in the temporal regions, probable chronic small vessel ischemic change.  EEG performed on 6/13/2023 impression stated left anterior temporal sharp waves, per impression sharp waves noted are abnormal but not entirely specific, may be seen in setting of partial seizures.  These findings were reviewed with Dr. Rosales and patient was started on Keppra 500 mg twice daily. TSH 2.580, CMP within normal limits with exception of CO2 29.4, additional lab work pending.  Prior MoCA 25 out of 30.     In clinic today she reports that she is no longer having loss of time episodes, denies any more amnestic events. She denies SE from Keppra 500 mg BID. She feels that her memory has improved. She is not losing time like she was before. She reports that if she misses a dose she notices change in her memory, she is now compliant with Keppra 500 mg BID. She reports minimal to  no alcohol use. No history of closed head injury. No family history of seizures. No history of being born prematurely or of issues  achieving developmental milestones growing up.       Current visit 2/19/2024:  Krystina Henley returns to neurology clinic for continued evaluation of partial seizures. EEG performed on 6/13/2023 impression stated left anterior temporal sharp waves, per impression sharp waves noted are abnormal but not entirely specific, may be seen in setting of partial seizures.  These findings were reviewed with Dr. Rosales and patient was started on Keppra 500 mg twice daily for partial seizures. She states that since she has been taking Keppra she has not had any more amnestic spells that she knows of. She also notes that overall her memory has improved some, she feels that she is able to process new information more easily. Vitamin B12 was 564, methylmalonic acid level was normal. RPR was negative. Folate was normal. MOCA previously was 26/30. She does note some daytime sleepiness as SE from Keppra but reports that this is manageable, she sleeps about 9 hours each night. She also reports some joint pain since starting Atorvastatin.       Subjective      Review of Systems   Constitutional:  Positive for fatigue.   Eyes: Negative.    Respiratory: Negative.     Cardiovascular: Negative.    Gastrointestinal: Negative.    Endocrine: Negative.    Genitourinary: Negative.    Musculoskeletal:         Reports mild joint aching   Skin: Negative.    Allergic/Immunologic: Negative.    Neurological:         No known recurrence of seizures   Hematological: Negative.    Psychiatric/Behavioral:  Negative for sleep disturbance. The patient is nervous/anxious.           Past Medical History:   Past Medical History:   Diagnosis Date    Abnormal ECG [facility]kn    Anemia     Anxiety     Arrhythmia     Carotid artery occlusion 2021    Colon polyp 01/16/2014    Coronary artery disease 04/2021    Depression     Diverticulosis     Dog bite     arms, face, legs    Dog bite     Ear piercing     Elevated cholesterol     Essential hypertension     patient states she  does not have hypertension    Fibroid     uterine    GERD (gastroesophageal reflux disease)     Headache, tension-type     Heartburn     Hiatal hernia     History of nuclear stress test 03/05/2021    History of transfusion     no adverse reactions    HL (hearing loss)     Hx of echocardiogram 03/18/2021    Hyperlipidemia     Myocardial infarction [place]    Osteoarthritis     generalized    Osteomyelitis     PONV (postoperative nausea and vomiting)     Seasonal allergies     Tinnitus     Wears glasses     reading only       Past Surgical History:   Past Surgical History:   Procedure Laterality Date    BLADDER SLING MODIFIED, ANTERIOR AND POSTERIOR VAGINAL REPAIR      BREAST BIOPSY Bilateral     CARDIAC CATHETERIZATION N/A 03/18/2021    Procedure: Left Heart Cath;  Surgeon: Ernesto Corona III, MD;  Location:  [facility];  Service: Cardiovascular;  Laterality: N/A;    CARDIAC CATHETERIZATION N/A 03/18/2021    Procedure: Percutaneous Coronary Intervention;  Surgeon: Fredrick Juares MD;  Location:  [facility];  Service: Cardiovascular;  Laterality: N/A;    CARDIAC SURGERY  2021    COLONOSCOPY  01/16/2014    COLONOSCOPY N/A 07/31/2020    Procedure: COLONOSCOPY WITH BIOPSY AND HOT SNARE POLYPECTOMY;  Surgeon: Juan Coreas MD;  Location: [facility] ENDOSCOPY;  Service: Gastroenterology;  Laterality: N/A;    COLONOSCOPY N/A 01/25/2021    Procedure: COLONOSCOPY WITH COLD BIOPSY POLYPECTOMY;  Surgeon: Juan Coreas MD;  Location: [facility] ENDOSCOPY;  Service: Gastroenterology;  Laterality: N/A;    COLONOSCOPY N/A 01/21/2022    Procedure: COLONOSCOPY ;  Surgeon: Juan Coreas MD;  Location: [facility] ENDOSCOPY;  Service: Gastroenterology;  Laterality: N/A;    CORONARY ARTERY BYPASS GRAFT  04/2021    ENDOSCOPY N/A 02/19/2020    Procedure: ESOPHAGOGASTRODUODENOSCOPY;  Surgeon: Marito Abdi MD;  Location: [facility] ENDOSCOPY;  Service: Gastroenterology;  Laterality: N/A;     ENDOSCOPY N/A 2020    Procedure: ESOPHAGOGASTRODUODENOSCOPY;  Surgeon: Marito Abdi MD;  Location: [facility] ENDOSCOPY;  Service: Gastroenterology;  Laterality: N/A;    ENDOSCOPY N/A 2022    Procedure: ESOPHAGOGASTRODUODENOSCOPY WITH BIOPSY;  Surgeon: Juan Coreas MD;  Location: [facility] ENDOSCOPY;  Service: Gastroenterology;  Laterality: N/A;    FINGER SURGERY Left     Index finger    HYSTERECTOMY      still has one ovary on left side    UPPER GASTROINTESTINAL ENDOSCOPY  2014    VAGINAL DELIVERY      x3    WISDOM TOOTH EXTRACTION         Family History:   Family History   Problem Relation Age of Onset    Lung cancer Mother     Anxiety disorder Mother     Lung cancer Father     Cancer Father     Prostate cancer Brother         x 3 brothers    Heart attack Brother         x 2 brothers     Heart disease Brother     Colon cancer Neg Hx        Social History:   Social History     Socioeconomic History    Marital status:    Tobacco Use    Smoking status: Former     Packs/day: 1.00     Years: 10.00     Additional pack years: 0.00     Total pack years: 10.00     Types: Cigarettes     Quit date: 2016     Years since quittin.6     Passive exposure: Never    Smokeless tobacco: Never    Tobacco comments:     Intermittent   Vaping Use    Vaping Use: Never used   Substance and Sexual Activity    Alcohol use: Yes     Alcohol/week: 3.0 standard drinks of alcohol     Types: 3 Glasses of wine per week     Comment: social    Drug use: Never    Sexual activity: Not Currently     Partners: Male     Birth control/protection: Post-menopausal       Medications:     Current Outpatient Medications:     aspirin (aspirin) 81 MG EC tablet, Take 1 tablet by mouth Daily., Disp: 30 tablet, Rfl: 1    atorvastatin (LIPITOR) 20 MG tablet, Take 1 tablet by mouth Daily., Disp: 30 tablet, Rfl: 3    Diclofenac Sodium (Voltaren Arthritis Pain) 1 % gel gel, Apply 4 g topically to the appropriate area as directed 4  "(Four) Times a Day As Needed (joint pain)., Disp: 100 g, Rfl: 3    levETIRAcetam (KEPPRA) 500 MG tablet, Take 1 tablet by mouth 2 (Two) Times a Day for 180 days., Disp: 180 tablet, Rfl: 1    nitroglycerin (Nitrostat) 0.4 MG SL tablet, Place 1 tablet under the tongue Every 5 (Five) Minutes As Needed for Chest Pain. Take no more than 3 doses in 15 minutes., Disp: 12 tablet, Rfl: 12    omeprazole (priLOSEC) 40 MG capsule, TAKE 1 CAPSULE BY MOUTH EVERY DAY. APPOINTMENT NEEDED., Disp: 30 capsule, Rfl: 0    ondansetron ODT (ZOFRAN-ODT) 4 MG disintegrating tablet, Place 1 tablet on the tongue 4 (Four) Times a Day As Needed for Nausea., Disp: 20 tablet, Rfl: 0    sertraline (ZOLOFT) 100 MG tablet, Take 1 tablet by mouth 2 (Two) Times a Day., Disp: , Rfl:     Allergies:   No Known Allergies      [facility], and patient is at HIGH risk for falls. Assessment completed on:2/19/2024    Objective     Objective:    /68   Pulse 68   Ht 154.9 cm (61\")   Wt 74.4 kg (164 lb)   LMP  (LMP Unknown)   SpO2 96%   BMI 30.99 kg/m²   Body mass index is 30.99 kg/m².    Physical Exam  Vitals reviewed.   Constitutional:       Appearance: Normal appearance.   HENT:      Head: Normocephalic and atraumatic.      Mouth/Throat:      Mouth: Mucous membranes are moist.      Pharynx: Oropharynx is clear.   Pulmonary:      Effort: Pulmonary effort is normal. No respiratory distress.   Musculoskeletal:      Right lower leg: No edema.      Left lower leg: No edema.   Skin:     General: Skin is warm and dry.   Neurological:      Mental Status: She is alert.          Neurology Exam:    General apperance: NAD.     Mental status: Alert, awake and oriented to time place and person.    Fund of knowledge:  Normal.     Language and Speech: No aphasia or dysarthria.    Naming , Repitition and Comprehension:  Can name objects, repeat a sentence and follow commands. Speech is clear and fluent with good repetition, " comprehension, and naming.    Cranial Nerves:   CN II: Visual fields are full. Intact.  Pupils - PERRLA  CN III, IV and VI: Extraocular movements are intact. Normal saccades.   CN V: Facial sensation is intact.   CN VII: Muscles of facial expression reveal no asymmetry. Intact.   CN VIII: Hearing is intact.   CN IX and X: Palate elevates symmetrically. Intact  CN XI: Shoulder shrug is intact.   CN XII: Tongue is midline without evidence of atrophy or fasciculation.     Motor:  Right UE muscle strength 5/5. Normal tone.     Left UE muscle strength 5/5. Normal tone.      Right LE muscle strength 5/5. Normal tone.     Left LE muscle strength 5/5. Normal tone.      Sensory: Normal light touch sensation bilaterally.    DTRs: 2+ bilaterally in upper and lower extremities.    Coordination: Normal finger-to-nose    Gait: Normal. No assistive device        Results:   Imaging:   No Images in the past 120 days found..     Labs:   Lab Results   Component Value Date    GLUCOSE 87 09/28/2023    BUN 22 09/28/2023    CREATININE 0.92 09/28/2023    EGFRRESULT 78.0 04/24/2023    EGFR 68.0 09/28/2023    BCR 23.9 09/28/2023    K 4.1 09/28/2023    CO2 27.0 09/28/2023    CALCIUM 9.5 09/28/2023    PROTENTOTREF 7.3 04/24/2023    ALBUMIN 4.7 09/28/2023    BILITOT 0.2 09/28/2023    AST 28 09/28/2023    ALT 21 09/28/2023         Assessment / Plan      Assessment/Plan:   Diagnoses and all orders for this visit:    1. Partial seizures  -     levETIRAcetam (KEPPRA) 500 MG tablet; Take 1 tablet by mouth 2 (Two) Times a Day for 180 days.  Dispense: 180 tablet; Refill: 1       Krystina Henley returns to neurology clinic for continued evaluation of partial seizures, she reports that she has been taking Keppra 500 mg BID, he denies any recurrence of seizure activity since taking Keppra.  She does note that if she accidentally misses a dose of Keppra that she does not feel as clearheaded the next day and overall tries to maintain compliance with her  medications.  She does note some mild fatigue.  She also notes some joint pain for which she has been directed to follow-up with her primary care as she has been using diclofenac gel for this but also wonders that this may also be from her atorvastatin.  Will plan to continue Keppra unchanged today, I have asked the patient please contact clinic with any questions or concerns and otherwise we will plan to follow-up in approximately 6 months.    Patient Education:       Reviewed medications, potential side effects and signs and symptoms to report. Discussed risk versus benefits of treatment plan with patient and/or family-including medications, labs and radiology that may be ordered. Addressed questions and concerns during visit. Patient and/or family verbalized understanding and agree with plan. Instructed to call the office with any questions and report to ER with any life-threatening symptoms.     Follow Up:   Return in about 6 months (around 8/19/2024).    I spent 30  minutes in the care of this patient. I personally spent 50 percent of this time counseling and discussing diagnosis, diagnostic testing, evaluation, treatment options, and management .       During this visit the following were done:  Labs Reviewed []    Labs Ordered []    Radiology Reports Reviewed []    Radiology Ordered []    PCP Records Reviewed []    Referring Provider Records Reviewed []    ER Records Reviewed []    Hospital Records Reviewed []    History Obtained From Family []    Radiology Images Reviewed []    Other Reviewed []    Records Requested []      PETTY Navas  [facility]  [address] [facility] [address]  [place] [id number] [phone]

## 2024-02-20 RX ORDER — OMEPRAZOLE 40 MG/1
CAPSULE, DELAYED RELEASE ORAL
Qty: 30 CAPSULE | Refills: 0 | OUTPATIENT
Start: 2024-02-20

## 2024-03-27 DIAGNOSIS — K21.9 GASTROESOPHAGEAL REFLUX DISEASE WITHOUT ESOPHAGITIS: ICD-10-CM

## 2024-03-27 RX ORDER — OMEPRAZOLE 40 MG/1
40 CAPSULE, DELAYED RELEASE ORAL DAILY
Qty: 30 CAPSULE | Refills: 0 | Status: SHIPPED | OUTPATIENT
Start: 2024-03-27

## 2024-06-17 ENCOUNTER — OFFICE VISIT (OUTPATIENT)
Dept: CARDIOLOGY | Facility: CLINIC | Age: 69
End: 2024-06-17
Payer: MEDICARE

## 2024-06-17 VITALS
SYSTOLIC BLOOD PRESSURE: 94 MMHG | HEART RATE: 72 BPM | HEIGHT: 62 IN | WEIGHT: 166 LBS | OXYGEN SATURATION: 97 % | DIASTOLIC BLOOD PRESSURE: 62 MMHG | BODY MASS INDEX: 30.55 KG/M2

## 2024-06-17 DIAGNOSIS — I10 ESSENTIAL HYPERTENSION: ICD-10-CM

## 2024-06-17 DIAGNOSIS — E78.2 MIXED HYPERLIPIDEMIA: ICD-10-CM

## 2024-06-17 DIAGNOSIS — I25.119 CORONARY ARTERY DISEASE INVOLVING NATIVE CORONARY ARTERY OF NATIVE HEART WITH ANGINA PECTORIS: Primary | ICD-10-CM

## 2024-06-17 PROCEDURE — 99214 OFFICE O/P EST MOD 30 MIN: CPT | Performed by: INTERNAL MEDICINE

## 2024-06-17 PROCEDURE — G2211 COMPLEX E/M VISIT ADD ON: HCPCS | Performed by: INTERNAL MEDICINE

## 2024-06-17 PROCEDURE — 3078F DIAST BP <80 MM HG: CPT | Performed by: INTERNAL MEDICINE

## 2024-06-17 PROCEDURE — 3074F SYST BP LT 130 MM HG: CPT | Performed by: INTERNAL MEDICINE

## 2024-06-17 RX ORDER — BUSPIRONE HYDROCHLORIDE 5 MG/1
1 TABLET ORAL DAILY
COMMUNITY
Start: 2024-05-16

## 2024-06-17 RX ORDER — CHOLECALCIFEROL (VITAMIN D3) 1250 MCG
50000 CAPSULE ORAL
COMMUNITY
Start: 2024-05-16

## 2024-06-17 NOTE — PROGRESS NOTES
[facility] at [facility]  Office visit  Krystina Henley  1955  [phone]  There is no work phone number on file.    VISIT DATE:  6/17/2024    PCP: Enrico Corona DO  [address] [facility] [address]  [facility] [address]    CC:  Chief Complaint   Patient presents with    Coronary artery disease involving native coronary        Previous cardiac studies and procedures:  March 2021   Anai scan myocardial perfusion imaging  Left ventricular ejection fraction is hyperdynamic (Calculated EF > 70%). .  Myocardial perfusion imaging indicates a large-sized, severe area of ischemia located in the anterior wall, apex and septal wall.  Impressions are consistent with a high risk study.  Findings consistent with an abnormal ECG stress test.  Cardiac catheterization:  90 to 95% ostial LAD stenosis.  Normal LVEDP  No aortic stenosis  Bilateral carotid duplex  Left internal carotid artery stenosis of 50-69%.  Proximal right internal carotid artery plaque without significant stenosis.  Right internal carotid artery stenosis of 0-49%.  Transthoracic echocardiogram  Left ventricular ejection fraction appears to be 66 - 70%. Left ventricular systolic function is normal.  Left ventricular diastolic function is consistent with (grade I) impaired relaxation.  Estimated right ventricular systolic pressure from tricuspid regurgitation is normal (<35 mmHg).    April 2021: MIDCAB STONE to LAD.    September 2022 carotid duplex  Right internal carotid artery stenosis of 0-49%.  Left internal carotid artery stenosis of 50-69%.    October 2022 TTE  Left ventricular ejection fraction appears to be 61 - 65%. Left ventricular systolic function is normal.  Left ventricular diastolic function is consistent with (grade I) impaired relaxation.    December 2023 carotid duplex    Right internal carotid artery demonstrates a less than 50% stenosis.    Left internal carotid artery demonstrates a 50-69% stenosis.    ASSESSMENT:    Diagnosis Plan   1. Coronary artery disease involving native coronary artery of native heart with angina pectoris        2. Essential hypertension        3. Mixed hyperlipidemia              PLAN:  Coronary artery disease: Currently stable and asymptomatic.  Continue aspirin 81 mg p.o. daily.  Afterload well controlled.      Hyperlipidemia: Goal LDL less than 70.  Intolerant to simvastatin, rosuvastatin, and lovastatin due to arthralgias.  Continue Zetia.    PCSK9 inhibitor cost prohibitive.  Cece previously was going to be $700 an injection.  Tolerating current dose of atorvastatin, lipid profile pending.    Carotid atherosclerosis: Mild right internal carotid stenosis, moderate left internal carotid artery stenosis.  Annual ultrasound evaluation..  Continue aggressive risk factor modification.      Subjective  Interval assessment: Blood pressures running less than 130/80 mmHg.  compliant with medical therapy.  Denies chest pain, palpitations or dyspnea on exertion.    Initial evaluation: 65-year-old female with a history of familial hyperlipidemia and family history of early coronary disease presents with gradually progressive exertional chest discomfort.  Describes low precordial chest discomfort radiating to her back and shoulders and arms with such activities as walking up an incline.  The symptoms have occurred consistently over the previous 2 years.  She has been self-medicating with nitroglycerin, she will walk until she develops limiting chest discomfort and then stop and take a sublingual nitroglycerin until her pain resolves.  She reports that she can walk about 1/8 of a mile on level ground before sprinting limiting chest discomfort but experiences chest discomfort which is limiting she encounters any sort of incline.  She has previously been intolerant to atorvastatin and simvastatin with regard to myalgias.  Previously prescribed rosuvastatin which she has not started taking yet.     PHYSICAL  "EXAMINATION:  Vitals:    06/17/24 1517   BP: 94/62   BP Location: Left arm   Patient Position: Sitting   Pulse: 72   SpO2: 97%   Weight: 75.3 kg (166 lb)   Height: 156.2 cm (61.5\")         General Appearance:    Alert, cooperative, no distress, appears stated age   Head:    Normocephalic, without obvious abnormality, atraumatic   Eyes:    conjunctiva/corneas clear   Nose:   Nares normal, septum midline, mucosa normal, no drainage   Throat:   Lips, teeth and gums normal   Neck:   Supple, symmetrical, trachea midline, no carotid    bruit or JVD   Lungs:     Clear to auscultation bilaterally, respirations unlabored   Chest Wall:    No tenderness or deformity    Heart:    Regular rate and rhythm, S1 and S2 normal, no murmur, rub   or gallop, normal carotid impulse bilaterally without bruit.                       Diagnostic Data:  Procedures  Lab Results   Component Value Date    CHLPL 325 (H) 04/24/2023    TRIG 122 09/28/2023    HDL 94 (H) 09/28/2023     Lab Results   Component Value Date    GLUCOSE 87 09/28/2023    BUN 22 09/28/2023    CREATININE 0.92 09/28/2023     09/28/2023    K 4.1 09/28/2023     09/28/2023    CO2 27.0 09/28/2023     No results found for: \"HGBA1C\"  Lab Results   Component Value Date    WBC 6.21 04/24/2023    HGB 13.3 04/24/2023    HCT 39.5 04/24/2023     04/24/2023       Allergies  No Known Allergies    Current Medications    Current Outpatient Medications:     aspirin (aspirin) 81 MG EC tablet, Take 1 tablet by mouth Daily., Disp: 30 tablet, Rfl: 1    atorvastatin (LIPITOR) 20 MG tablet, Take 1 tablet by mouth Daily., Disp: 30 tablet, Rfl: 3    busPIRone (BUSPAR) 5 MG tablet, Take 1 tablet by mouth Daily., Disp: , Rfl:     Cholecalciferol (Vitamin D3) 1.25 MG (23915 UT) capsule, Take 1 capsule by mouth Every 30 (Thirty) Days., Disp: , Rfl:     Diclofenac Sodium (Voltaren Arthritis Pain) 1 % gel gel, Apply 4 g topically to the appropriate area as directed 4 (Four) Times a Day As " Needed (joint pain)., Disp: 100 g, Rfl: 3    levETIRAcetam (KEPPRA) 500 MG tablet, Take 1 tablet by mouth 2 (Two) Times a Day for 180 days., Disp: 180 tablet, Rfl: 1    nitroglycerin (Nitrostat) 0.4 MG SL tablet, Place 1 tablet under the tongue Every 5 (Five) Minutes As Needed for Chest Pain. Take no more than 3 doses in 15 minutes., Disp: 12 tablet, Rfl: 12    omeprazole (priLOSEC) 40 MG capsule, TAKE 1 CAPSULE BY MOUTH EVERY DAY, Disp: 30 capsule, Rfl: 0    ondansetron ODT (ZOFRAN-ODT) 4 MG disintegrating tablet, Place 1 tablet on the tongue 4 (Four) Times a Day As Needed for Nausea., Disp: 20 tablet, Rfl: 0    sertraline (ZOLOFT) 100 MG tablet, Take 1 tablet by mouth 2 (Two) Times a Day., Disp: , Rfl:           ROS  ROS      SOCIAL HX  Social History     Socioeconomic History    Marital status:    Tobacco Use    Smoking status: Former     Current packs/day: 0.00     Average packs/day: 1 pack/day for 10.0 years (10.0 ttl pk-yrs)     Types: Cigarettes     Start date: 2006     Quit date: 2016     Years since quittin.9     Passive exposure: Never    Smokeless tobacco: Never    Tobacco comments:     Intermittent   Vaping Use    Vaping status: Never Used   Substance and Sexual Activity    Alcohol use: Yes     Alcohol/week: 3.0 standard drinks of alcohol     Types: 3 Glasses of wine per week     Comment: social    Drug use: Never    Sexual activity: Not Currently     Partners: Male     Birth control/protection: Post-menopausal       FAMILY HX  Family History   Problem Relation Age of Onset    Lung cancer Mother     Anxiety disorder Mother     Lung cancer Father     Cancer Father     Prostate cancer Brother         x 3 brothers    Heart attack Brother         x 2 brothers     Heart disease Brother     Colon cancer Neg Hx              Ernesto Corona III, MD, FACC

## 2024-08-16 ENCOUNTER — LAB (OUTPATIENT)
Dept: LAB | Facility: HOSPITAL | Age: 69
End: 2024-08-16
Payer: MEDICARE

## 2024-08-16 ENCOUNTER — TRANSCRIBE ORDERS (OUTPATIENT)
Dept: ADMINISTRATIVE | Facility: HOSPITAL | Age: 69
End: 2024-08-16
Payer: MEDICARE

## 2024-08-16 DIAGNOSIS — Z12.31 VISIT FOR SCREENING MAMMOGRAM: Primary | ICD-10-CM

## 2024-08-16 LAB
CHOLEST SERPL-MCNC: 197 MG/DL (ref 0–200)
HDLC SERPL-MCNC: 79 MG/DL (ref 40–60)
LDLC SERPL CALC-MCNC: 104 MG/DL (ref 0–100)
LDLC/HDLC SERPL: 1.3 {RATIO}
TRIGL SERPL-MCNC: 75 MG/DL (ref 0–150)
VLDLC SERPL-MCNC: 14 MG/DL (ref 5–40)

## 2024-08-16 PROCEDURE — 80061 LIPID PANEL: CPT | Performed by: INTERNAL MEDICINE

## 2024-08-19 ENCOUNTER — TELEPHONE (OUTPATIENT)
Dept: CARDIOLOGY | Facility: CLINIC | Age: 69
End: 2024-08-19
Payer: MEDICARE

## 2024-08-19 NOTE — TELEPHONE ENCOUNTER
----- Message from Ernesto Corona sent at 8/19/2024  9:05 AM EDT -----  Significant improvement in cholesterol numbers, continue current medical therapy.   Detail Level: Zone

## 2024-08-22 ENCOUNTER — OFFICE VISIT (OUTPATIENT)
Dept: NEUROLOGY | Facility: CLINIC | Age: 69
End: 2024-08-22
Payer: MEDICARE

## 2024-08-22 VITALS
BODY MASS INDEX: 31.34 KG/M2 | HEIGHT: 61 IN | HEART RATE: 59 BPM | WEIGHT: 166 LBS | DIASTOLIC BLOOD PRESSURE: 84 MMHG | OXYGEN SATURATION: 96 % | SYSTOLIC BLOOD PRESSURE: 124 MMHG

## 2024-08-22 DIAGNOSIS — R56.9 PARTIAL SEIZURES: Primary | ICD-10-CM

## 2024-08-22 NOTE — PROGRESS NOTES
Neuro Office Visit      Encounter Date: 2024   Patient Name: Krystina Henley  : 1955   MRN: [id number]   PCP:  Salty Pardo MD     Chief Complaint:    Chief Complaint   Patient presents with    partial seizures       History of Present Illness: Krystina Henley is a 68 y.o. female who is here today in Neurology for  Partial seizures    Krystina Henley is a 67 y.o. female who is here today in Neurology for episodes of amnesia.  Referred by Dr. Ernesto Corona. She has a past medical history of HTN, HLD, CAD, CABG, conductive hearing loss bilateral ears, tinnitus both ears, vitamin D deficiency, GERD, urinary incontinence, tubulovillous adenoma, anxiety, depression, osteomyelitis of finger of left hand.  She reports that she has had intermittent episodes of amnesia for approximately a year which are often associated with periods of high anxiety.  She tells me that her primary care has started her on Zoloft which significantly helped with the anxiety but then caused tremors so she had the dose decreased and that she is working with primary care to address antianxiety medications.  Her daughter is here with her today and states that when she has the 6 episodes of extreme fear she often will be amnestic surrounding the event.  They recall specific time in 2022 when she was swimming with [employer] and that she cannot remember this day at all, daughter reports about 2-3 of these episodes where she has complete amnesia surrounding an event.  They have also noticed that overall her memory is declining.   She has noted symptoms since at least  marked initially by forgetfulness . This has gradually worsened  over time. Additional symptoms have included impairments in short term memory and learning new things . There have been associated  symptoms of anxiety  and depression . She denies  impairments in ADL's. She manages her medications  and finances. She continues to drive.  . She  is currently residing at home with daughter.      Family History: None  Personal Neurological History: No history of stroke or seizure  Education & Work History: RN  Psychological History: Anxiety and Depression  Sleep Habits & History: Affected by sleep  Chronic Pain: None  Hearing or Vision Impairments: Mild hearing   Personal History of Cancer: None     She follows with Dr. Corona for carotid artery stenosis who per his most recent documentation plans on annual ultrasound evaluation along with aggressive risk factor modification  9/12/2022  CUS: Proximal right internal carotid artery plaque without significant stenosis.  Right internal carotid artery stenosis of 0-49%.  Left internal carotid artery stenosis of 50-69%.        Follow up  visit 9/28/2023:  Krystina Henley returns to neurology clinic for continued evaluation of MCI - MRI brain was performed on 6/13/2023 and showed parenchymal volume loss greatest in the temporal regions, probable chronic small vessel ischemic change.  EEG performed on 6/13/2023 impression stated left anterior temporal sharp waves, per impression sharp waves noted are abnormal but not entirely specific, may be seen in setting of partial seizures.  These findings were reviewed with Dr. Rosales and patient was started on Keppra 500 mg twice daily. TSH 2.580, CMP within normal limits with exception of CO2 29.4, additional lab work pending.  Prior MoCA 25 out of 30.     In clinic today she reports that she is no longer having loss of time episodes, denies any more amnestic events. She denies SE from Keppra 500 mg BID. She feels that her memory has improved. She is not losing time like she was before. She reports that if she misses a dose she notices change in her memory, she is now compliant with Keppra 500 mg BID. She reports minimal to  no alcohol use. No history of closed head injury. No family history of seizures. No history of being born prematurely or of issues achieving developmental  milestones growing up.         Follow up visit 2/19/2024:  Krystina Henley returns to neurology clinic for continued evaluation of partial seizures. EEG performed on 6/13/2023 impression stated left anterior temporal sharp waves, per impression sharp waves noted are abnormal but not entirely specific, may be seen in setting of partial seizures.  These findings were reviewed with Dr. Rosales and patient was started on Keppra 500 mg twice daily for partial seizures. She states that since she has been taking Keppra she has not had any more amnestic spells that she knows of. She also notes that overall her memory has improved some, she feels that she is able to process new information more easily. Vitamin B12 was 564, methylmalonic acid level was normal. RPR was negative. Folate was normal. MOCA previously was 26/30. She does note some daytime sleepiness as SE from Keppra but reports that this is manageable, she sleeps about 9 hours each night. She also reports some joint pain since starting Atorvastatin.     Current visit 8/22/2024:  Krystina Henley returns to neurology clinic for continued evaluation of partial seizures, she reports that she has been taking Keppra 500 mg BID without return of seizures. No missed doses of medication. She continues to work as RN. She denies losing periods of time and feels that compared to how she was doing prior to taking Keppra her memory has significantly improved. Feels that mood is stable on Buspar 5 mg daily plus Zoloft 100 mg BID. She is sleeping well at night. She does note persistent sleepiness/grogginess during the day. She continues to follow with Dr. Corona for carotid atherosclerosis with plan for yearly carotid ultrasound and aggressive risk factor modification - she is taking Lipitor 20 mg daily.       Subjective      Review of Systems   Constitutional:  Positive for fatigue.   HENT: Negative.     Eyes:         Feels that overall her visual acuity is declining overtime and that  she may need new glasses, no loss of vision   Respiratory: Negative.     Gastrointestinal: Negative.    Genitourinary: Negative.    Musculoskeletal:  Positive for myalgias.   Skin: Negative.    Neurological:  Negative for seizures.   Psychiatric/Behavioral:  Negative for sleep disturbance.           Past Medical History:   Past Medical History:   Diagnosis Date    Abnormal ECG [facility]kn    Anemia     Anxiety     Arrhythmia     Carotid artery occlusion 2021    Colon polyp 01/16/2014    Coronary artery disease 04/2021    Depression     Diverticulosis     Dog bite     arms, face, legs    Dog bite     Ear piercing     Elevated cholesterol     Essential hypertension     patient states she does not have hypertension    Fibroid     uterine    GERD (gastroesophageal reflux disease)     Headache, tension-type     Heartburn     Hiatal hernia     History of nuclear stress test 03/05/2021    History of transfusion     no adverse reactions    HL (hearing loss)     Hx of echocardiogram 03/18/2021    Hyperlipidemia     Myocardial infarction [place]    Osteoarthritis     generalized    Osteomyelitis     PONV (postoperative nausea and vomiting)     Seasonal allergies     Tinnitus     Wears glasses     reading only       Past Surgical History:   Past Surgical History:   Procedure Laterality Date    BLADDER SLING MODIFIED, ANTERIOR AND POSTERIOR VAGINAL REPAIR      BREAST BIOPSY Bilateral     CARDIAC CATHETERIZATION N/A 03/18/2021    Procedure: Left Heart Cath;  Surgeon: Ernesto Corona III, MD;  Location:  [facility];  Service: Cardiovascular;  Laterality: N/A;    CARDIAC CATHETERIZATION N/A 03/18/2021    Procedure: Percutaneous Coronary Intervention;  Surgeon: Fredrick Juares MD;  Location:  [facility];  Service: Cardiovascular;  Laterality: N/A;    CARDIAC SURGERY  2021    COLONOSCOPY  01/16/2014    COLONOSCOPY N/A 07/31/2020    Procedure: COLONOSCOPY WITH BIOPSY AND HOT SNARE POLYPECTOMY;  Surgeon:  Juan Coreas MD;  Location: [facility] ENDOSCOPY;  Service: Gastroenterology;  Laterality: N/A;    COLONOSCOPY N/A 2021    Procedure: COLONOSCOPY WITH COLD BIOPSY POLYPECTOMY;  Surgeon: Juan Coreas MD;  Location: [facility] ENDOSCOPY;  Service: Gastroenterology;  Laterality: N/A;    COLONOSCOPY N/A 2022    Procedure: COLONOSCOPY ;  Surgeon: Juan Coreas MD;  Location: [facility] ENDOSCOPY;  Service: Gastroenterology;  Laterality: N/A;    CORONARY ARTERY BYPASS GRAFT  2021    ENDOSCOPY N/A 2020    Procedure: ESOPHAGOGASTRODUODENOSCOPY;  Surgeon: Marito Abdi MD;  Location: [facility] ENDOSCOPY;  Service: Gastroenterology;  Laterality: N/A;    ENDOSCOPY N/A 2020    Procedure: ESOPHAGOGASTRODUODENOSCOPY;  Surgeon: Marito Abdi MD;  Location: [facility] ENDOSCOPY;  Service: Gastroenterology;  Laterality: N/A;    ENDOSCOPY N/A 2022    Procedure: ESOPHAGOGASTRODUODENOSCOPY WITH BIOPSY;  Surgeon: Juan Coreas MD;  Location: [facility] ENDOSCOPY;  Service: Gastroenterology;  Laterality: N/A;    FINGER SURGERY Left     Index finger    HYSTERECTOMY      still has one ovary on left side    UPPER GASTROINTESTINAL ENDOSCOPY  2014    VAGINAL DELIVERY      x3    WISDOM TOOTH EXTRACTION         Family History:   Family History   Problem Relation Age of Onset    Lung cancer Mother     Anxiety disorder Mother     Lung cancer Father     Cancer Father     Prostate cancer Brother         x 3 brothers    Heart attack Brother         x 2 brothers     Heart disease Brother     Colon cancer Neg Hx        Social History:   Social History     Socioeconomic History    Marital status:    Tobacco Use    Smoking status: Former     Current packs/day: 0.00     Average packs/day: 1 pack/day for 10.0 years (10.0 ttl pk-yrs)     Types: Cigarettes     Start date: 2006     Quit date: 2016     Years since quittin.1     Passive exposure: Never    Smokeless tobacco: Never     "Tobacco comments:     Intermittent   Vaping Use    Vaping status: Never Used   Substance and Sexual Activity    Alcohol use: Yes     Alcohol/week: 3.0 standard drinks of alcohol     Types: 3 Glasses of wine per week     Comment: social    Drug use: Never    Sexual activity: Not Currently     Partners: Male     Birth control/protection: Post-menopausal       Medications:     Current Outpatient Medications:     aspirin (aspirin) 81 MG EC tablet, Take 1 tablet by mouth Daily., Disp: 30 tablet, Rfl: 1    atorvastatin (LIPITOR) 20 MG tablet, Take 1 tablet by mouth Daily., Disp: 30 tablet, Rfl: 3    busPIRone (BUSPAR) 5 MG tablet, Take 1 tablet by mouth Daily., Disp: , Rfl:     Cholecalciferol (Vitamin D3) 1.25 MG (34192 UT) capsule, Take 1 capsule by mouth Every 30 (Thirty) Days., Disp: , Rfl:     Diclofenac Sodium (Voltaren Arthritis Pain) 1 % gel gel, Apply 4 g topically to the appropriate area as directed 4 (Four) Times a Day As Needed (joint pain)., Disp: 100 g, Rfl: 3    nitroglycerin (Nitrostat) 0.4 MG SL tablet, Place 1 tablet under the tongue Every 5 (Five) Minutes As Needed for Chest Pain. Take no more than 3 doses in 15 minutes., Disp: 12 tablet, Rfl: 12    omeprazole (priLOSEC) 40 MG capsule, TAKE 1 CAPSULE BY MOUTH EVERY DAY, Disp: 30 capsule, Rfl: 0    ondansetron ODT (ZOFRAN-ODT) 4 MG disintegrating tablet, Place 1 tablet on the tongue 4 (Four) Times a Day As Needed for Nausea., Disp: 20 tablet, Rfl: 0    sertraline (ZOLOFT) 100 MG tablet, Take 1 tablet by mouth 2 (Two) Times a Day., Disp: , Rfl:     levETIRAcetam XR (KEPPRA XR) 1000 MG tablet sustained-release 24 hour tablet, Take 1 tablet by mouth Every Night for 180 days., Disp: 30 tablet, Rfl: 5    Allergies:   No Known Allergies      [facility] Fall Risk Assessment was completed, and patient is at HIGH risk for falls. Assessment completed on:8/22/2024    Objective     Objective:    /84   Pulse 59   Ht 156.2 cm (61.5\")   Wt 75.3 kg (166 lb)   LMP  " (LMP Unknown)   SpO2 96%   BMI 30.86 kg/m²   Body mass index is 30.86 kg/m².    Physical Exam  Vitals reviewed.   Constitutional:       Appearance: Normal appearance.   HENT:      Head: Normocephalic and atraumatic.      Mouth/Throat:      Mouth: Mucous membranes are moist.      Pharynx: Oropharynx is clear.   Pulmonary:      Effort: Pulmonary effort is normal. No respiratory distress.   Skin:     General: Skin is warm and dry.   Neurological:      Mental Status: She is alert.          Neurology Exam:    General apperance: NAD.     Mental status: Alert, awake and oriented to time place and person.    Fund of knowledge:  Normal.     Language and Speech: No aphasia or dysarthria.    Naming , Repetition and Comprehension:  Can name objects, repeat a sentence and follow commands. Speech is clear and fluent with good repetition, comprehension, and naming.    Cranial Nerves:   CN II: Visual fields are full. Intact. Pupils - PERRLA  CN III, IV and VI: Extraocular movements are intact. Normal saccades.   CN V: Facial sensation is intact.   CN VII: Muscles of facial expression reveal no asymmetry. Intact.   CN VIII: Hearing is intact.   CN IX and X: Palate elevates symmetrically. Intact  CN XI: Shoulder shrug is intact.   CN XII: Tongue is midline without evidence of atrophy or fasciculation.     Motor:  Right UE muscle strength 5/5. Normal tone.     Left UE muscle strength 5/5. Normal tone.      Right LE muscle strength 5/5. Normal tone.     Left LE muscle strength 5/5. Normal tone.      Sensory: Normal light touch sensation bilaterally.    DTRs: 2+ bilaterally in upper and lower extremities.    Coordination: Normal finger-to-nose    Gait: Normal.          Results:   Imaging:   CT chest for pulmonary embolus    Result Date: 4/24/2024  No PE or dissection . Small hiatal hernia with wall thickening of the distal esophagus. 6 mm right upper lobe noncalcified nodule with vague nodular densities just above the minor fissure. A  3-6 month follow-up chest CT is recommended. Images reviewed, interpreted, and dictated by Dr. Rayray Barger. Transcribed by ELLIOT Kimble(TELLY).    XR Chest 1 View    Result Date: 4/24/2024  No acute cardiopulmonary process. Images reviewed, interpreted, and dictated by Dr. Rayray Barger. Transcribed by Meme Bush PA-C.       Labs:   Lab Results   Component Value Date    GLUCOSE 87 09/28/2023    BUN 22 09/28/2023    CREATININE 0.92 09/28/2023     09/28/2023    K 4.1 09/28/2023     09/28/2023    CALCIUM 9.5 09/28/2023    PROTEINTOT 7.9 09/28/2023    ALBUMIN 4.7 09/28/2023    ALT 21 09/28/2023    AST 28 09/28/2023    ALKPHOS 100 09/28/2023    BILITOT 0.2 09/28/2023    GLOB 3.2 09/28/2023    AGRATIO 1.5 09/28/2023    BCR 23.9 09/28/2023    ANIONGAP 10.0 09/28/2023    EGFR 68.0 09/28/2023         Assessment / Plan      Assessment/Plan:   Diagnoses and all orders for this visit:    1. Partial seizures (Primary)  -     levETIRAcetam XR (KEPPRA XR) 1000 MG tablet sustained-release 24 hour tablet; Take 1 tablet by mouth Every Night for 180 days.  Dispense: 30 tablet; Refill: 5       Krystina Henley returns to neurology clinic for continued evaluation of partial seizures (MRI brain was performed on 6/13/2023 showed parenchymal volume loss greatest in the temporal regions, probable chronic small vessel ischemic change.  EEG performed on 6/13/2023 impression stated left anterior temporal sharp waves, per impression sharp waves noted are abnormal but not entirely specific, may be seen in setting of partial seizures). She has been well controlled on Keppra 500 mg BID but has noted daytime grogginess, we will switch to Keppra XR 1000 mg nightly to try to minimize daytime sleepiness. She is to contact clinic with any questions or concerns regarding the adjusted dose. Will plan to see Krystina back in clinic in 6 months or sooner if any needs arise.     Patient Education:       Reviewed medications,  potential side effects and signs and symptoms to report. Discussed risk versus benefits of treatment plan with patient and/or family-including medications, labs and radiology that may be ordered. Addressed questions and concerns during visit. Patient and/or family verbalized understanding and agree with plan. Instructed to call the office with any questions and report to ER with any life-threatening symptoms.     Follow Up:   Return in about 6 months (around 2/22/2025).    I spent 30  minutes in the care of this patient. I personally spent 50 percent of this time counseling and discussing diagnosis, diagnostic testing, driving, treatment options, and management .       During this visit the following were done:  Labs Reviewed []    Labs Ordered []    Radiology Reports Reviewed []    Radiology Ordered []    PCP Records Reviewed []    Referring Provider Records Reviewed []    ER Records Reviewed []    Hospital Records Reviewed []    History Obtained From Family []    Radiology Images Reviewed []    Other Reviewed [x]  Cardiology note, Dr. Corona, 6/17/2024  Records Requested []      PETTY Navas  [facility]  [address] [address] [phone]

## 2024-08-26 ENCOUNTER — TELEPHONE (OUTPATIENT)
Dept: NEUROLOGY | Facility: CLINIC | Age: 69
End: 2024-08-26
Payer: MEDICARE

## 2024-08-26 DIAGNOSIS — R56.9 PARTIAL SEIZURES: ICD-10-CM

## 2024-08-26 RX ORDER — LEVETIRACETAM 500 MG/1
1000 TABLET, FILM COATED, EXTENDED RELEASE ORAL NIGHTLY
Qty: 60 TABLET | Refills: 5 | Status: SHIPPED | OUTPATIENT
Start: 2024-08-26 | End: 2025-02-22

## 2024-08-26 NOTE — TELEPHONE ENCOUNTER
I have sent Keppra  mg tablets, two tablets nightly to equal 1000 mg nightly to [facility] pharmacy, please let me know if there are any additional questions, thank you.

## 2024-08-26 NOTE — TELEPHONE ENCOUNTER
Provider: IRIS    Caller: SUKHI    Pharmacy: [id number]    Phone Number: [phone]    Reason for Call: PT CALLED AND STATES THAT PHARMACY IS TELLING HER THEY DO NOT HAVE HER 1000MG OF KEPPERA XR. PT IS WANTING TO KNOW IF MEDICATION CAN BE RESENT TO PHARMACY. PT ALSO STATES PHARMACY TOLD HER THAT THEY MAY NOT BE ABLE TO GET THE 1000MG OF MEDICATION AND IS MAY HAVE TO BE CALLING IN FOR 2 500MG  XR TABLETS.    PLEASE REVIEW AND ADVISE  THANK YOU

## 2024-08-26 NOTE — TELEPHONE ENCOUNTER
Called to verify with pharmacy and Beni stated that copay for medication was $824.00 and that Krystina said they were going to transfer to [facility].  They have not heard anything since and are waiting to hear from Krystina.  Spoke with Krystina and she stated that she was told they did not have the medication in stock and was not told about the copay cost.  She wants us to send in new script to [facility] for the 500mg XR tablets.

## 2024-09-03 RX ORDER — ATORVASTATIN CALCIUM 20 MG/1
20 TABLET, FILM COATED ORAL DAILY
Qty: 30 TABLET | Refills: 5 | Status: SHIPPED | OUTPATIENT
Start: 2024-09-03

## 2024-09-29 DIAGNOSIS — K21.9 GASTROESOPHAGEAL REFLUX DISEASE WITHOUT ESOPHAGITIS: ICD-10-CM

## 2024-09-30 RX ORDER — OMEPRAZOLE 40 MG/1
40 CAPSULE, DELAYED RELEASE ORAL DAILY
Qty: 30 CAPSULE | Refills: 0 | OUTPATIENT
Start: 2024-09-30

## 2024-10-03 ENCOUNTER — OFFICE VISIT (OUTPATIENT)
Dept: GASTROENTEROLOGY | Facility: CLINIC | Age: 69
End: 2024-10-03
Payer: MEDICARE

## 2024-10-03 VITALS
BODY MASS INDEX: 30.68 KG/M2 | HEART RATE: 68 BPM | SYSTOLIC BLOOD PRESSURE: 120 MMHG | OXYGEN SATURATION: 98 % | WEIGHT: 165 LBS | DIASTOLIC BLOOD PRESSURE: 80 MMHG

## 2024-10-03 DIAGNOSIS — Z86.0100 PERSONAL HISTORY OF COLON POLYPS, UNSPECIFIED: Chronic | ICD-10-CM

## 2024-10-03 DIAGNOSIS — Z12.11 ENCOUNTER FOR SCREENING FOR MALIGNANT NEOPLASM OF COLON: Primary | Chronic | ICD-10-CM

## 2024-10-03 DIAGNOSIS — K21.9 GASTROESOPHAGEAL REFLUX DISEASE, UNSPECIFIED WHETHER ESOPHAGITIS PRESENT: Chronic | ICD-10-CM

## 2024-10-03 DIAGNOSIS — R13.10 DYSPHAGIA, UNSPECIFIED TYPE: Chronic | ICD-10-CM

## 2024-10-03 DIAGNOSIS — R11.0 NAUSEA: Chronic | ICD-10-CM

## 2024-10-03 RX ORDER — POLYETHYLENE GLYCOL 3350, SODIUM SULFATE, SODIUM CHLORIDE, POTASSIUM CHLORIDE, ASCORBIC ACID, SODIUM ASCORBATE 140-9-5.2G
1 KIT ORAL TAKE AS DIRECTED
Qty: 1 EACH | Refills: 0 | Status: SHIPPED | OUTPATIENT
Start: 2024-10-03

## 2024-10-03 RX ORDER — SODIUM CHLORIDE 9 MG/ML
70 INJECTION, SOLUTION INTRAVENOUS CONTINUOUS PRN
OUTPATIENT
Start: 2024-10-03

## 2024-10-03 NOTE — PATIENT INSTRUCTIONS
Antireflux measures: Avoid fried, fatty foods, alcohol, chocolate, coffee, tea,  soft drinks, all carbonated beverages (including sparkling water), peppermint and spearmint, spicy foods, tomatoes and tomato based foods, onions, peppers, and garlic.   Other antireflux measures include weight reduction if overweight, avoiding tight clothing around the abdomen, elevating the head of the bed 6 inches with blocks under the head board, and don't drink or eat before going to bed and avoid lying down immediately after meals.  Continue to avoid vaping/smoking.  Omeprazole 40 mg 1 by mouth in the am 30 minutes before breakfast.  Eat relatively soft diet, eat in upright position and chew food well.    Drink water after 2-3 bites and take medications with liberal amounts of water.   After eating and taking medications, remain in upright position for 5-10 minutes.  Colonoscopy: The indications, technique, alternatives and potential risk and complications were discussed with the patient including but not limited to bleeding, perforations, missing lesions and anesthetic complications. The patient understands and wishes to proceed with the procedure and has given their verbal consent. Written patient education information was given to the patient.   The patient will call if they have further questions before procedure.

## 2024-10-03 NOTE — PROGRESS NOTES
Follow Up Note     Date: 10/03/2024   Patient Name: Krystina Henley  MRN: [id number]  : 1955     Primary Care Provider: Salty Pardo MD     Chief Complaint   Patient presents with    Colon Cancer Screening     10/3/2024  History of Present Illness  The patient presents for colon cancer screening.  She denies any abdominal pain, constipation or diarrhea.  There is no history of GI bleeding.  She is taking omeprazole 40 mg daily and still has some breakthrough reflux.  She has taken pantoprazole in the past but it did not help.  She has occasional difficulty swallowing, but it is not bad at this time.  She had EGD in  by Dr. Wu and swallowing improved.  She has intermittent nausea, comes and goes.  No vomiting.  Denies GI bleeding.  No family history of GI malignancy.     Interval History:  2022  Krystina Henley is a 66 y.o. female who is here today for follow up regarding Results (EGD), Difficulty Swallowing, and Abdominal Pain.     Still with RUQ abdominal but improved some since procedure. She has been modifying her diet and avoiding foods that make her reflux better and feels that this has helped. Has been taking omeprazole 40 mg once daily and pepcid 40 mg PRN heartburn. Takes zofran with relief of intermittent nausea without vomiting. BMs are daily and formed, denies any constipation. She had EGD recently and would like to discuss those results.        2020  The patient has a history of reflux off and on for the last several years.  The reflux is rather severe.  Symptoms are described as retrosternal burning sensation, and indigestion.  There is history of occasional regurgitative symptoms.  Frequency being several times per week.  The symptoms are worse at night.  The patient takes acid suppressive therapy with significant breakthrough symptoms.  The patient denies significant consumption of soda beverages, coffee or tea.  She denies eating chocolates.  The  "patient is a non-smoker.  She however drinks wine perhaps 2 drinks a week.  There is history of some recent weight gain of about 5 pounds over the last 1 month or so.  Her swallowing has significantly improved.     The patient has history of diarrhea off-and-on for the last several months.  Diarrhea is episodic and occurs perhaps couple of times a week.  Severity is mild, frequency of bowel movements being 2-3 times a day.  The stools are described as loose.  Occasionally, there is nocturnal element of diarrhea. The diarrhea is not associated with tenesmus.  The patient has been having recurrent bright red blood per rectum.  She has also noticed passage of \"clots\".  There is no associated dizziness.  There is some soreness at the rectal area.  There is no history of hematemesis or melena.  Her previous colonoscopy was in 2014.     There is no history of liver or pancreatic disease.  There is no family history of colon cancer, inflammatory bowel disease, celiac disease or chronic liver disease.    Subjective      Past Medical History:   Diagnosis Date    Abnormal ECG [facility]    Anemia     Anxiety     Arrhythmia     Carotid artery occlusion 2021    Colon polyp 01/16/2014    Coronary artery disease 04/2021    Depression     Diverticulosis     Dog bite     arms, face, legs    Dog bite     Ear piercing     Elevated cholesterol     Essential hypertension     patient states she does not have hypertension    Fibroid     uterine    GERD (gastroesophageal reflux disease)     Headache, tension-type     Heartburn     Hiatal hernia     History of nuclear stress test 03/05/2021    History of transfusion     no adverse reactions    HL (hearing loss)     Hx of echocardiogram 03/18/2021    Hyperlipidemia     Myocardial infarction [place]    Osteoarthritis     generalized    Osteomyelitis     PONV (postoperative nausea and vomiting)     Seasonal allergies     Tinnitus     Wears glasses     reading only     Past Surgical History:   Procedure " Laterality Date    BLADDER SLING MODIFIED, ANTERIOR AND POSTERIOR VAGINAL REPAIR      BREAST BIOPSY Bilateral     CARDIAC CATHETERIZATION N/A 03/18/2021    Procedure: Left Heart Cath;  Surgeon: Ernesto Corona III, MD;  Location:  [facility];  Service: Cardiovascular;  Laterality: N/A;    CARDIAC CATHETERIZATION N/A 03/18/2021    Procedure: Percutaneous Coronary Intervention;  Surgeon: Fredrick Juares MD;  Location:  [facility];  Service: Cardiovascular;  Laterality: N/A;    CARDIAC SURGERY  2021    COLONOSCOPY  01/16/2014    COLONOSCOPY N/A 07/31/2020    Procedure: COLONOSCOPY WITH BIOPSY AND HOT SNARE POLYPECTOMY;  Surgeon: Juan Coreas MD;  Location: [facility] ENDOSCOPY;  Service: Gastroenterology;  Laterality: N/A;    COLONOSCOPY N/A 01/25/2021    Procedure: COLONOSCOPY WITH COLD BIOPSY POLYPECTOMY;  Surgeon: Juan Coreas MD;  Location: [facility] ENDOSCOPY;  Service: Gastroenterology;  Laterality: N/A;    COLONOSCOPY N/A 01/21/2022    Procedure: COLONOSCOPY ;  Surgeon: Juan Coreas MD;  Location: [facility] ENDOSCOPY;  Service: Gastroenterology;  Laterality: N/A;    CORONARY ARTERY BYPASS GRAFT  04/2021    ENDOSCOPY N/A 02/19/2020    Procedure: ESOPHAGOGASTRODUODENOSCOPY;  Surgeon: Marito Abdi MD;  Location: [facility] ENDOSCOPY;  Service: Gastroenterology;  Laterality: N/A;    ENDOSCOPY N/A 06/03/2020    Procedure: ESOPHAGOGASTRODUODENOSCOPY;  Surgeon: Marito Abdi MD;  Location: [facility] ENDOSCOPY;  Service: Gastroenterology;  Laterality: N/A;    ENDOSCOPY N/A 04/25/2022    Procedure: ESOPHAGOGASTRODUODENOSCOPY WITH BIOPSY;  Surgeon: Juan Coreas MD;  Location: [facility] ENDOSCOPY;  Service: Gastroenterology;  Laterality: N/A;    FINGER SURGERY Left     Index finger    HYSTERECTOMY      still has one ovary on left side    UPPER GASTROINTESTINAL ENDOSCOPY  03/13/2014    VAGINAL DELIVERY      x3    WISDOM TOOTH EXTRACTION       Family History    Problem Relation Age of Onset    Lung cancer Mother     Anxiety disorder Mother     Lung cancer Father     Cancer Father     Prostate cancer Brother         x 3 brothers    Heart attack Brother         x 2 brothers     Heart disease Brother     Colon cancer Neg Hx      Social History     Socioeconomic History    Marital status:    Tobacco Use    Smoking status: Former     Current packs/day: 0.00     Average packs/day: 1 pack/day for 10.0 years (10.0 ttl pk-yrs)     Types: Cigarettes     Start date: 2006     Quit date: 2016     Years since quittin.2     Passive exposure: Never    Smokeless tobacco: Never    Tobacco comments:     Intermittent   Vaping Use    Vaping status: Never Used   Substance and Sexual Activity    Alcohol use: Yes     Alcohol/week: 3.0 standard drinks of alcohol     Types: 3 Glasses of wine per week     Comment: social    Drug use: Never    Sexual activity: Not Currently     Partners: Male     Birth control/protection: Post-menopausal       Current Outpatient Medications:     aspirin (aspirin) 81 MG EC tablet, Take 1 tablet by mouth Daily., Disp: 30 tablet, Rfl: 1    atorvastatin (LIPITOR) 20 MG tablet, TAKE 1 TABLET BY MOUTH EVERY DAY, Disp: 30 tablet, Rfl: 5    busPIRone (BUSPAR) 5 MG tablet, Take 1 tablet by mouth Daily., Disp: , Rfl:     Cholecalciferol (Vitamin D3) 1.25 MG (22201 UT) capsule, Take 1 capsule by mouth Every 30 (Thirty) Days., Disp: , Rfl:     Diclofenac Sodium (Voltaren Arthritis Pain) 1 % gel gel, Apply 4 g topically to the appropriate area as directed 4 (Four) Times a Day As Needed (joint pain)., Disp: 100 g, Rfl: 3    levETIRAcetam ER (KEPPRA XR) 500 MG tablet, Take 2 tablets by mouth Every Night for 180 days., Disp: 60 tablet, Rfl: 5    nitroglycerin (Nitrostat) 0.4 MG SL tablet, Place 1 tablet under the tongue Every 5 (Five) Minutes As Needed for Chest Pain. Take no more than 3 doses in 15 minutes., Disp: 12 tablet, Rfl: 12    omeprazole (priLOSEC)  40 MG capsule, TAKE 1 CAPSULE BY MOUTH EVERY DAY, Disp: 30 capsule, Rfl: 0    ondansetron ODT (ZOFRAN-ODT) 4 MG disintegrating tablet, Place 1 tablet on the tongue 4 (Four) Times a Day As Needed for Nausea., Disp: 20 tablet, Rfl: 0    sertraline (ZOLOFT) 100 MG tablet, Take 1 tablet by mouth 2 (Two) Times a Day., Disp: , Rfl:     PEG-KCl-NaCl-NaSulf-Na Asc-C (Plenvu) 140 g reconstituted solution solution, Take 140 g by mouth Take As Directed. Take 1 kit orally as directed for colonoscopy prep., Disp: 1 each, Rfl: 0    No Known Allergies    The following portions of the patient's history were reviewed and updated as appropriate: allergies, current medications, past family history, past medical history, past social history, past surgical history and problem list.  Objective     Physical Exam  Vitals and nursing note reviewed.   Constitutional:       General: She is not in acute distress.     Appearance: Normal appearance. She is well-developed.   HENT:      Head: Normocephalic and atraumatic.      Mouth/Throat:      Mouth: Mucous membranes are not pale, not dry and not cyanotic.   Eyes:      General: Lids are normal.   Neck:      Trachea: Trachea normal.   Cardiovascular:      Rate and Rhythm: Normal rate.   Pulmonary:      Effort: Pulmonary effort is normal. No respiratory distress.      Breath sounds: Normal breath sounds.   Abdominal:      Tenderness: There is no abdominal tenderness.   Skin:     General: Skin is warm and dry.   Neurological:      Mental Status: She is alert and oriented to person, place, and time.   Psychiatric:         Mood and Affect: Mood normal.         Speech: Speech normal.         Behavior: Behavior normal. Behavior is cooperative.       Vitals:    10/03/24 1458   BP: 120/80   Pulse: 68   SpO2: 98%   Weight: 74.8 kg (165 lb)     Body mass index is 30.68 kg/m².     Results Review:   I reviewed the patient's new clinical results.    No visits with results within 90 Day(s) from this visit.    Latest known visit with results is:   Office Visit on 06/17/2024   Component Date Value Ref Range Status    Total Cholesterol 08/16/2024 197  0 - 200 mg/dL Final    Triglycerides 08/16/2024 75  0 - 150 mg/dL Final    HDL Cholesterol 08/16/2024 79 (H)  40 - 60 mg/dL Final    LDL Cholesterol  08/16/2024 104 (H)  0 - 100 mg/dL Final    VLDL Cholesterol 08/16/2024 14  5 - 40 mg/dL Final    LDL/HDL Ratio 08/16/2024 1.30   Final      Comprehensive Metabolic Panel (09/28/2023 09:15)  TSH (09/28/2023 09:15)  COMPREHENSIVE METABOLIC PANEL (04/24/2024 11:01)  CBC with Auto Diff (04/24/2024 10:49)    US gb 2020:  Small gallbladder wall polyps.     FL Esophagram Complete Single Contrast     Result Date: 5/6/2022  1. Small hiatal hernia without observed reflux. 2. Benign-appearing stricture at the GE junction not considered clinically significant given rapid passage of 12 mm barium tablet through the narrowed segment.     CTAP with contrast dated 7/7/2022  1. Known gallbladder polyp is not visualized.  2. Simple appearing cyst in the left lobe of the liver.    Gallbladder ultrasound dated 8/8/2022  Small amount of sludge in the gallbladder lumen. No cholelithiasis. No  evidence of acute cholecystitis.    EGD dated Yari 3, 2020 per Dr. Abdi  - Erosive distal esophagitis.  LA class A.    - Distal esophageal angulation and early stricturing.  Status post dilation to 18 mm.    - Free gastroesophageal reflux.    - Moderate sliding hiatal hernia around 3 to 4 cm.   - Erythematous gastritis.    - No definite Castellanos's.    - Second portion of duodenum, biopsy revealed unremarkable duodenal mucosa with preserved villous architecture.  Antrum, body and fundus, biopsies revealed reactive gastropathy with minimal chronic inactive gastritis.  No Helicobacter organisms identified.  Negative for intestinal metaplasia, dysplasia and malignancy.  Stomach, body and fundus, polyps, biopsy revealed polypoid fragments of oxyntic mucosa with  minimal chronic inactive gastritis and mild foveolar hyperplasia.  No Helicobacter organisms identified.  Negative for intestinal metaplasia, dysplasia and malignancy.     Colonoscopy dated 1/25/2021 per Dr. Coreas  - Prior polypectomy site rectosigmoid appeared normal.    - 1 3 mm polyp in sigmoid colon removed.    - Diverticulosis in sigmoid colon.    - Nonbleeding internal hemorrhoids.   - Otherwise unremarkable.    - Sigmoid colon polyp biopsy with hyperplastic polyp.     Colonoscopy was completed Dr. Coreas on 1/21/2022:  - The perianal and digital rectal examinations were normal.  - A few small-mouthed diverticula were found in the sigmoid colon.  - Anal papilla(e) were hypertrophied.  - The terminal ileum appeared normal.  - No specimens collected.      EGD per Dr. Coreas 4/25/2022:  - The oropharynx was normal.  - The Z-line was irregular and was found 35 cm from the incisors.  - The lower third of the esophagus was moderately tortuous with acute angulation at distal esophagus likely from Hiatal hernia.  - A 2 cm hiatal hernia was present.  - LA Grade B (one or more mucosal breaks greater than 5 mm, not extending between the tops of two mucosal folds) esophagitis with no bleeding was found in the lower third of the esophagus.  - No endoscopic abnormality was evident in the esophagus to explain the patient's complaint of dysphagia except acute angulation at distal esophagus likely from hiatal hernia. Biopsies were obtained from the proximal and distal esophagus with cold forceps for histology for eosinophilic esophagitis.  - Patchy mildly erythematous mucosa without bleeding was found in the prepyloric region of the stomach. Biopsies were taken with a cold forceps for Helicobacter pylori testing.  - The duodenal bulb, first portion of the duodenum, second portion of the duodenum and third portion of the duodenum were normal. Biopsies for histology were taken with a cold forceps for evaluation of celiac  disease.  - Pathology showed normal duodenum, moderate chronic gastritis, no H pylori, esophageal biopsy showed reflux esophagitis without metaplasia.     EGD dated 2/17/2023 per Dr. Babar Wu  Hiatal hernia.  Gastritis, acute.  Gastric biopsies with mild chronic gastritis involving gastric antrum and body type mucosa.  Negative for H. pylori.  Negative for intestinal metaplasia, dysplasia and malignancy.    Assessment / Plan      1. Encounter for screening for malignant neoplasm of colon  2. Personal history of colon polyps, unspecified  Colonoscopy dated 7/31/2020 with polyps removed, large 25 to 30 mm pedunculated lesion in the rectosigmoid junction removed piecemeal, pathology consistent with tubulovillous adenoma with high-grade dysplasia, polyp stalk did not reveal any high-grade dysplasia.  Colonoscopy in January 2021 with 1 small hyperplastic sigmoid polyp removed, prior polypectomy site unremarkable.  Colonoscopy January 2022 with normal colonic mucosa, no polyps removed.  No family history of colon cancer.  Colonoscopy for screening due to advanced colon polyp in the past.    - Case Request  - PEG-KCl-NaCl-NaSulf-Na Asc-C (Plenvu) 140 g reconstituted solution solution; Take 140 g by mouth Take As Directed. Take 1 kit orally as directed for colonoscopy prep.  Dispense: 1 each; Refill: 0    3. Gastroesophageal reflux disease, unspecified whether esophagitis present  4. Dysphagia, unspecified type  5. Nausea  She is on omeprazole 40 mg daily and still has some reflux.  She has tried pantoprazole in the Plast but it did not help much.  Difficulty swallowing doing okay at this time.  She has nausea that comes and goes.  No vomiting.  Denies GI bleeding.  Basic labs unremarkable.  TSH normal.  CTAP with contrast dated 7/7/2022 unremarkable.  Gallbladder ultrasound dated 8/8/2022 with small amount of sludge in the gallbladder lumen noted, no cholelithiasis, no evidence of acute cholecystitis.  EGD dated  2/17/2023 per Dr. Wu with acute gastritis, biopsies unremarkable.  Antireflux measures.  Discussed trial of Voquezna - patient wishes to wait at this time.   Continue Omeprazole 40 mg daily for now.    Patient Instructions   Antireflux measures: Avoid fried, fatty foods, alcohol, chocolate, coffee, tea,  soft drinks, all carbonated beverages (including sparkling water), peppermint and spearmint, spicy foods, tomatoes and tomato based foods, onions, peppers, and garlic.   Other antireflux measures include weight reduction if overweight, avoiding tight clothing around the abdomen, elevating the head of the bed 6 inches with blocks under the head board, and don't drink or eat before going to bed and avoid lying down immediately after meals.  Continue to avoid vaping/smoking.  Omeprazole 40 mg 1 by mouth in the am 30 minutes before breakfast.  Eat relatively soft diet, eat in upright position and chew food well.    Drink water after 2-3 bites and take medications with liberal amounts of water.   After eating and taking medications, remain in upright position for 5-10 minutes.  Colonoscopy: The indications, technique, alternatives and potential risk and complications were discussed with the patient including but not limited to bleeding, perforations, missing lesions and anesthetic complications. The patient understands and wishes to proceed with the procedure and has given their verbal consent. Written patient education information was given to the patient.   The patient will call if they have further questions before procedure.     PETTY Elder  10/3/2024    Please note that portions of this note were completed with a voice recognition program.     Patient or patient representative verbalized consent for the use of Ambient Listening during the visit with  PETTY Elder for chart documentation. 10/3/2024

## 2024-10-04 PROBLEM — Z12.11 ENCOUNTER FOR SCREENING FOR MALIGNANT NEOPLASM OF COLON: Status: ACTIVE | Noted: 2024-10-03

## 2024-10-15 ENCOUNTER — HOSPITAL ENCOUNTER (OUTPATIENT)
Dept: MAMMOGRAPHY | Facility: HOSPITAL | Age: 69
Discharge: HOME | End: 2024-10-15
Admitting: INTERNAL MEDICINE
Payer: MEDICARE

## 2024-10-15 DIAGNOSIS — Z12.31 VISIT FOR SCREENING MAMMOGRAM: ICD-10-CM

## 2024-10-15 PROCEDURE — 77063 BREAST TOMOSYNTHESIS BI: CPT

## 2024-10-15 PROCEDURE — 77067 SCR MAMMO BI INCL CAD: CPT

## 2025-01-07 NOTE — PRE-PROCEDURE INSTRUCTIONS
PAT phone history completed with patient for upcoming procedure on 1/10/25 with Dr. Coreas.    PAT PASS reviewed with patient and they verbalize understanding of the following:     Do not eat or drink anything after midnight the night before procedure unless otherwise instructed by physician/surgeon's office, this includes no gum, candy, mints, tobacco products or e-cigarettes.  Do not shave the area to be operated on at least 48 hours prior to procedure.  Do not wear makeup, lotion, hair products, or nail polish.  Do not wear any jewelry and remove all piercings.  Do not wear any adhesive if you wear dentures.  Do not wear contacts; bring in glasses if needed.  Only take medications on the morning of procedure as instructed by PAT nurse per anesthesia guidelines or as instructed by physician's office.  If you are on any blood thinners reach out to the physician/surgeon's office for instructions on when/if they will need to be stopped prior to procedure.  Bring in picture ID and insurance card, advanced directive copies if applicable, CPAP/BIPAP/Inhalers if indicated morning of procedure, leave any other valuables at home.  Ensure you have arranged for someone to drive you home the day of your procedure and someone to care for you at home afterwards. It is recommended that you do not drive, drink alcohol, or make any major legal decisions for at least 24 hours after your procedure is complete.  ERAS instructions given unless otherwise instructed per surgeon's orders.    Instructions given on hospital entrance and registration location.

## 2025-02-10 ENCOUNTER — OFFICE VISIT (OUTPATIENT)
Dept: CARDIOLOGY | Facility: CLINIC | Age: 70
End: 2025-02-10
Payer: MEDICARE

## 2025-02-10 VITALS
SYSTOLIC BLOOD PRESSURE: 114 MMHG | BODY MASS INDEX: 30.18 KG/M2 | WEIGHT: 164 LBS | HEART RATE: 82 BPM | DIASTOLIC BLOOD PRESSURE: 82 MMHG | OXYGEN SATURATION: 96 % | HEIGHT: 62 IN

## 2025-02-10 DIAGNOSIS — E78.00 PURE HYPERCHOLESTEROLEMIA: ICD-10-CM

## 2025-02-10 DIAGNOSIS — I25.119 CORONARY ARTERY DISEASE INVOLVING NATIVE CORONARY ARTERY OF NATIVE HEART WITH ANGINA PECTORIS: Primary | ICD-10-CM

## 2025-02-10 DIAGNOSIS — E78.2 MIXED HYPERLIPIDEMIA: ICD-10-CM

## 2025-02-10 DIAGNOSIS — I65.23 BILATERAL CAROTID ARTERY STENOSIS: ICD-10-CM

## 2025-02-10 DIAGNOSIS — I10 ESSENTIAL HYPERTENSION: ICD-10-CM

## 2025-02-10 RX ORDER — ERGOCALCIFEROL 1.25 MG/1
50000 CAPSULE, LIQUID FILLED ORAL
COMMUNITY
Start: 2024-12-21

## 2025-02-10 NOTE — PROGRESS NOTES
[facility] at [facility]  Office visit  Krystina Henley  1955  [phone]  There is no work phone number on file.    VISIT DATE:  2/10/2025    PCP: Salty Pardo MD  [address]  [facility] [address]    CC:  Chief Complaint   Patient presents with    Coronary Artery Disease     Coronary artery disease involving native coronary artery of native heart with angina pectoris           Previous cardiac studies and procedures:  March 2021   Anai scan myocardial perfusion imaging  Left ventricular ejection fraction is hyperdynamic (Calculated EF > 70%). .  Myocardial perfusion imaging indicates a large-sized, severe area of ischemia located in the anterior wall, apex and septal wall.  Impressions are consistent with a high risk study.  Findings consistent with an abnormal ECG stress test.  Cardiac catheterization:  90 to 95% ostial LAD stenosis.  Normal LVEDP  No aortic stenosis  Bilateral carotid duplex  Left internal carotid artery stenosis of 50-69%.  Proximal right internal carotid artery plaque without significant stenosis.  Right internal carotid artery stenosis of 0-49%.  Transthoracic echocardiogram  Left ventricular ejection fraction appears to be 66 - 70%. Left ventricular systolic function is normal.  Left ventricular diastolic function is consistent with (grade I) impaired relaxation.  Estimated right ventricular systolic pressure from tricuspid regurgitation is normal (<35 mmHg).    April 2021: MIDCAB STONE to LAD.    September 2022 carotid duplex  Right internal carotid artery stenosis of 0-49%.  Left internal carotid artery stenosis of 50-69%.    October 2022 TTE  Left ventricular ejection fraction appears to be 61 - 65%. Left ventricular systolic function is normal.  Left ventricular diastolic function is consistent with (grade I) impaired relaxation.    December 2023 carotid duplex    Right internal carotid artery demonstrates a less than 50% stenosis.    Left  internal carotid artery demonstrates a 50-69% stenosis.    ASSESSMENT:   Diagnosis Plan   1. Coronary artery disease involving native coronary artery of native heart with angina pectoris        2. Essential hypertension        3. Mixed hyperlipidemia        4. Pure hypercholesterolemia              PLAN:  Coronary artery disease: Currently stable and asymptomatic.   Afterload well controlled.  Restarting low-dose aspirin, she has agreed to take it every other day.    Hyperlipidemia: Goal LDL less than 70.  Intolerant to simvastatin, rosuvastatin, and lovastatin due to arthralgias.  Continue Zetia.    Cece previously was going to be $700 an injection.  Tolerating current dose of atorvastatin.  Complaining of joint crepitus, suspect that this is due to age-related osteoarthritis.  Recommending preauthorization for PCSK9 inhibitor.    Carotid atherosclerosis: Mild right internal carotid stenosis, moderate left internal carotid artery stenosis.  Annual ultrasound evaluation..  Continue aggressive risk factor modification.      Subjective  Interval assessment: Blood pressures running less than 130/80 mmHg.  compliant with medical therapy.  Denies chest pain, palpitations or dyspnea on exertion.  Stopped taking low-dose aspirin due to superficial prolonged bleeding with cuts, and easy bruising.  Complaining of joint crepitus.    Initial evaluation: 65-year-old female with a history of familial hyperlipidemia and family history of early coronary disease presents with gradually progressive exertional chest discomfort.  Describes low precordial chest discomfort radiating to her back and shoulders and arms with such activities as walking up an incline.  The symptoms have occurred consistently over the previous 2 years.  She has been self-medicating with nitroglycerin, she will walk until she develops limiting chest discomfort and then stop and take a sublingual nitroglycerin until her pain resolves.  She reports that she  "can walk about 1/8 of a mile on level ground before sprinting limiting chest discomfort but experiences chest discomfort which is limiting she encounters any sort of incline.  She has previously been intolerant to atorvastatin and simvastatin with regard to myalgias.  Previously prescribed rosuvastatin which she has not started taking yet.     PHYSICAL EXAMINATION:  Vitals:    02/10/25 1120   BP: 114/82   BP Location: Left arm   Patient Position: Sitting   Cuff Size: Adult   Pulse: 82   SpO2: 96%   Weight: 74.4 kg (164 lb)   Height: 157.5 cm (62\")         General Appearance:    Alert, cooperative, no distress, appears stated age   Head:    Normocephalic, without obvious abnormality, atraumatic   Eyes:    conjunctiva/corneas clear   Nose:   Nares normal, septum midline, mucosa normal, no drainage   Throat:   Lips, teeth and gums normal   Neck:   Supple, symmetrical, trachea midline, no carotid    bruit or JVD   Lungs:     Clear to auscultation bilaterally, respirations unlabored   Chest Wall:    No tenderness or deformity    Heart:    Regular rate and rhythm, S1 and S2 normal, no murmur, rub   or gallop, normal carotid impulse bilaterally without bruit.                       Diagnostic Data:  Procedures  Lab Results   Component Value Date    CHLPL 325 (H) 04/24/2023    TRIG 75 08/16/2024    HDL 79 (H) 08/16/2024     Lab Results   Component Value Date    GLUCOSE 87 09/28/2023    BUN 22 09/28/2023    CREATININE 0.92 09/28/2023     09/28/2023    K 4.1 09/28/2023     09/28/2023    CO2 27.0 09/28/2023     No results found for: \"HGBA1C\"  Lab Results   Component Value Date    WBC 6.21 04/24/2023    HGB 13.3 04/24/2023    HCT 39.5 04/24/2023     04/24/2023       Allergies  No Known Allergies    Current Medications    Current Outpatient Medications:     atorvastatin (LIPITOR) 20 MG tablet, TAKE 1 TABLET BY MOUTH EVERY DAY, Disp: 30 tablet, Rfl: 5    busPIRone (BUSPAR) 5 MG tablet, Take 1 tablet by mouth " Daily., Disp: , Rfl:     Cholecalciferol (Vitamin D3) 1.25 MG (45154 UT) capsule, Take 1 capsule by mouth Every 30 (Thirty) Days., Disp: , Rfl:     Diclofenac Sodium (Voltaren Arthritis Pain) 1 % gel gel, Apply 4 g topically to the appropriate area as directed 4 (Four) Times a Day As Needed (joint pain)., Disp: 100 g, Rfl: 3    levETIRAcetam ER (KEPPRA XR) 500 MG tablet, Take 2 tablets by mouth Every Night for 180 days., Disp: 60 tablet, Rfl: 5    nitroglycerin (Nitrostat) 0.4 MG SL tablet, Place 1 tablet under the tongue Every 5 (Five) Minutes As Needed for Chest Pain. Take no more than 3 doses in 15 minutes., Disp: 12 tablet, Rfl: 12    omeprazole (priLOSEC) 40 MG capsule, TAKE 1 CAPSULE BY MOUTH EVERY DAY, Disp: 30 capsule, Rfl: 0    ondansetron ODT (ZOFRAN-ODT) 4 MG disintegrating tablet, Place 1 tablet on the tongue 4 (Four) Times a Day As Needed for Nausea., Disp: 20 tablet, Rfl: 0    PEG-KCl-NaCl-NaSulf-Na Asc-C (Plenvu) 140 g reconstituted solution solution, Take 140 g by mouth Take As Directed. Take 1 kit orally as directed for colonoscopy prep., Disp: 1 each, Rfl: 0    sertraline (ZOLOFT) 100 MG tablet, Take 1 tablet by mouth 2 (Two) Times a Day., Disp: , Rfl:     vitamin D (ERGOCALCIFEROL) 1.25 MG (55377 UT) capsule capsule, Take 1 capsule by mouth Every 30 (Thirty) Days., Disp: , Rfl:     aspirin (aspirin) 81 MG EC tablet, Take 1 tablet by mouth Daily. (Patient not taking: Reported on 2025), Disp: 30 tablet, Rfl: 1          ROS  ROS      SOCIAL HX  Social History     Socioeconomic History    Marital status:    Tobacco Use    Smoking status: Former     Current packs/day: 0.00     Average packs/day: 1 pack/day for 10.0 years (10.0 ttl pk-yrs)     Types: Cigarettes     Start date: 2006     Quit date: 2016     Years since quittin.5     Passive exposure: Never    Smokeless tobacco: Never    Tobacco comments:     Intermittent   Vaping Use    Vaping status: Never Used   Substance and  Sexual Activity    Alcohol use: Not Currently     Alcohol/week: 3.0 standard drinks of alcohol     Types: 3 Glasses of wine per week    Drug use: Never    Sexual activity: Defer       FAMILY HX  Family History   Problem Relation Age of Onset    Lung cancer Mother     Anxiety disorder Mother     Lung cancer Father     Cancer Father     Prostate cancer Brother         x 3 brothers    Heart attack Brother         x 2 brothers     Heart disease Brother     Colon cancer Neg Hx     Breast cancer Neg Hx     Ovarian cancer Neg Hx              Ernesto Corona III, MD, FACC

## 2025-02-13 ENCOUNTER — SPECIALTY PHARMACY (OUTPATIENT)
Facility: HOSPITAL | Age: 70
End: 2025-02-13
Payer: MEDICARE

## 2025-02-25 ENCOUNTER — HOSPITAL ENCOUNTER (OUTPATIENT)
Dept: GENERAL RADIOLOGY | Facility: HOSPITAL | Age: 70
Discharge: HOME | End: 2025-02-25
Admitting: INTERNAL MEDICINE
Payer: MEDICARE

## 2025-02-25 ENCOUNTER — TRANSCRIBE ORDERS (OUTPATIENT)
Dept: GENERAL RADIOLOGY | Facility: HOSPITAL | Age: 70
End: 2025-02-25
Payer: MEDICARE

## 2025-02-25 DIAGNOSIS — M25.511 CHRONIC RIGHT SHOULDER PAIN: ICD-10-CM

## 2025-02-25 DIAGNOSIS — M25.511 CHRONIC RIGHT SHOULDER PAIN: Primary | ICD-10-CM

## 2025-02-25 DIAGNOSIS — G89.29 CHRONIC RIGHT SHOULDER PAIN: ICD-10-CM

## 2025-02-25 DIAGNOSIS — G89.29 CHRONIC RIGHT SHOULDER PAIN: Primary | ICD-10-CM

## 2025-02-25 PROCEDURE — 73030 X-RAY EXAM OF SHOULDER: CPT

## 2025-02-28 DIAGNOSIS — R56.9 PARTIAL SEIZURES: ICD-10-CM

## 2025-02-28 RX ORDER — LEVETIRACETAM 500 MG/1
1000 TABLET, FILM COATED, EXTENDED RELEASE ORAL NIGHTLY
Qty: 60 TABLET | Refills: 0 | Status: SHIPPED | OUTPATIENT
Start: 2025-02-28

## 2025-02-28 NOTE — TELEPHONE ENCOUNTER
Rx Refill Note  Requested Prescriptions     Pending Prescriptions Disp Refills    levETIRAcetam XR (KEPPRA XR) 500 MG tablet [Pharmacy Med Name: levETIRAcetam ER Oral Tablet Extended Release 24 Hour 500 MG] 60 tablet 0     Sig: TAKE 2 TABLETS BY MOUTH EVERY NIGHT      Last filled:8/26/24 5 ref  Last office visit with prescribing clinician: 8/22/2024      Next office visit with prescribing clinician: 4/29/2025     Tonja Benjamin MA  02/28/25, 12:59 EST    Sent to pharmacy w/5 ref

## 2025-03-04 ENCOUNTER — SPECIALTY PHARMACY (OUTPATIENT)
Facility: HOSPITAL | Age: 70
End: 2025-03-04
Payer: MEDICARE

## 2025-03-04 ENCOUNTER — HOSPITAL ENCOUNTER (OUTPATIENT)
Facility: HOSPITAL | Age: 70
Discharge: HOME | End: 2025-03-04
Admitting: INTERNAL MEDICINE
Payer: MEDICARE

## 2025-03-04 VITALS
SYSTOLIC BLOOD PRESSURE: 135 MMHG | WEIGHT: 164 LBS | BODY MASS INDEX: 30.18 KG/M2 | DIASTOLIC BLOOD PRESSURE: 83 MMHG | HEIGHT: 62 IN

## 2025-03-04 DIAGNOSIS — I65.23 BILATERAL CAROTID ARTERY STENOSIS: ICD-10-CM

## 2025-03-04 LAB
BH CV XLRA MEAS LEFT DIST CCA EDV: 25 CM/SEC
BH CV XLRA MEAS LEFT DIST CCA PSV: 60 CM/SEC
BH CV XLRA MEAS LEFT DIST ICA EDV: 29 CM/SEC
BH CV XLRA MEAS LEFT DIST ICA PSV: 65 CM/SEC
BH CV XLRA MEAS LEFT ICA/CCA RATIO: 2.1
BH CV XLRA MEAS LEFT MID CCA EDV: 24 CM/SEC
BH CV XLRA MEAS LEFT MID CCA PSV: 59 CM/SEC
BH CV XLRA MEAS LEFT MID ICA EDV: 25 CM/SEC
BH CV XLRA MEAS LEFT MID ICA PSV: 54 CM/SEC
BH CV XLRA MEAS LEFT PROX CCA EDV: 18.7 CM/SEC
BH CV XLRA MEAS LEFT PROX CCA PSV: 64.4 CM/SEC
BH CV XLRA MEAS LEFT PROX ECA PSV: 142 CM/SEC
BH CV XLRA MEAS LEFT PROX ICA EDV: 46.3 CM/SEC
BH CV XLRA MEAS LEFT PROX ICA PSV: 126 CM/SEC
BH CV XLRA MEAS LEFT PROX SCLA PSV: 110 CM/SEC
BH CV XLRA MEAS LEFT VERTEBRAL A EDV: 9 CM/SEC
BH CV XLRA MEAS LEFT VERTEBRAL A PSV: 30 CM/SEC
BH CV XLRA MEAS RIGHT DIST CCA EDV: 24 CM/SEC
BH CV XLRA MEAS RIGHT DIST CCA PSV: 70 CM/SEC
BH CV XLRA MEAS RIGHT DIST ICA EDV: 22 CM/SEC
BH CV XLRA MEAS RIGHT DIST ICA PSV: 56 CM/SEC
BH CV XLRA MEAS RIGHT ICA/CCA RATIO: 1
BH CV XLRA MEAS RIGHT MID CCA EDV: 26.9 CM/SEC
BH CV XLRA MEAS RIGHT MID CCA PSV: 74.1 CM/SEC
BH CV XLRA MEAS RIGHT MID ICA EDV: 15.4 CM/SEC
BH CV XLRA MEAS RIGHT MID ICA PSV: 40.3 CM/SEC
BH CV XLRA MEAS RIGHT PROX CCA EDV: 21.1 CM/SEC
BH CV XLRA MEAS RIGHT PROX CCA PSV: 75.2 CM/SEC
BH CV XLRA MEAS RIGHT PROX ECA PSV: 54 CM/SEC
BH CV XLRA MEAS RIGHT PROX ICA EDV: 26 CM/SEC
BH CV XLRA MEAS RIGHT PROX ICA PSV: 75 CM/SEC
BH CV XLRA MEAS RIGHT PROX SCLA PSV: 102 CM/SEC
BH CV XLRA MEAS RIGHT VERTEBRAL A EDV: 15 CM/SEC
BH CV XLRA MEAS RIGHT VERTEBRAL A PSV: 41 CM/SEC
LEFT ARM BP: NORMAL MMHG
RIGHT ARM BP: NORMAL MMHG

## 2025-03-04 PROCEDURE — 93880 EXTRACRANIAL BILAT STUDY: CPT

## 2025-03-04 PROCEDURE — 93880 EXTRACRANIAL BILAT STUDY: CPT | Performed by: INTERNAL MEDICINE

## 2025-03-04 RX ORDER — ALIROCUMAB 75 MG/ML
75 INJECTION, SOLUTION SUBCUTANEOUS
Qty: 6 ML | Refills: 3 | Status: SHIPPED | OUTPATIENT
Start: 2025-03-04

## 2025-03-04 NOTE — PROGRESS NOTES
Specialty Pharmacy Patient Management Program  Cardiology Initial Assessment     Krystina Henley was referred by a Cardiology provider to the Cardiology Patient Management program offered by [facility] for Hyperlipidemia on 03/04/25.  An initial outreach was conducted, including assessment of therapy appropriateness and specialty medication education for Praluent. The patient was introduced to services offered by [facility], including: regular assessments, refill coordination, mail order delivery options, prior authorization maintenance, and financial assistance programs as applicable. The patient was also provided with contact information for the pharmacy team.     Insurance Coverage & Financial Support  [employer] &  [employer]     Relevant Past Medical History and Comorbidities  Relevant medical history and concomitant health conditions were discussed with the patient. The patient's chart has been reviewed for relevant past medical history and comorbid conditions and updated as necessary.  Past Medical History:   Diagnosis Date    Abnormal ECG [facility]    Anemia     Anxiety     Arrhythmia     Carotid artery occlusion 2021    Colon polyp 01/16/2014    Coronary artery disease 04/2021    Depression     Diverticulosis     Dog bite     arms, face, legs    Ear piercing     Elevated cholesterol     Essential hypertension     patient states she does not have hypertension    Fibroid     uterine    GERD (gastroesophageal reflux disease)     Headache, tension-type     Heartburn     Hiatal hernia     History of nuclear stress test 03/05/2021    History of transfusion     no adverse reactions    HL (hearing loss)     Hx of echocardiogram 03/18/2021    Hx of exercise stress test     Hyperlipidemia     Myocardial infarction [facility]    x 1-2022    Osteoarthritis     generalized    Osteomyelitis     PONV (postoperative nausea and vomiting)     Seasonal allergies     Seizures     last seizure was  in     Tinnitus     Wears glasses     reading only     Social History     Socioeconomic History    Marital status:    Tobacco Use    Smoking status: Former     Current packs/day: 0.00     Average packs/day: 1 pack/day for 10.0 years (10.0 ttl pk-yrs)     Types: Cigarettes     Start date: 2006     Quit date: 2016     Years since quittin.6     Passive exposure: Never    Smokeless tobacco: Never    Tobacco comments:     Intermittent   Vaping Use    Vaping status: Never Used   Substance and Sexual Activity    Alcohol use: Not Currently     Alcohol/week: 3.0 standard drinks of alcohol     Types: 3 Glasses of wine per week    Drug use: Never    Sexual activity: Defer       Problem list reviewed by Meme Zavala, PharmD on 3/4/2025 at 10:20 AM    Allergies  Known allergies and reactions were discussed with the patient. The patient's chart has been reviewed for  allergy information and updated as necessary.   No Known Allergies    Allergies reviewed by Meme Zavala, PharmD on 3/4/2025 at 10:20 AM    Relevant Laboratory Values  Relevant laboratory values were discussed with the patient. The following specialty medication dose adjustment(s) are recommended: Begin Praluent for lipid lowering    Lab Results   Component Value Date    GLUCOSE 87 2023    CALCIUM 9.5 2023     2023    K 4.1 2023    CO2 27.0 2023     2023    BUN 22 2023    CREATININE 0.92 2023    EGFRIFAFRI 91 2021    EGFRIFNONA 75 2022    BCR 23.9 2023    ANIONGAP 10.0 2023     Lab Results   Component Value Date    CHOL 197 2024    CHLPL 325 (H) 2023    TRIG 75 2024    HDL 79 (H) 2024     (H) 2024         Current Medication List  This medication list has been reviewed with the patient and evaluated for any interactions or necessary modifications/recommendations, and updated to include all prescription medications,  OTC medications, and supplements the patient is currently taking.  This list reflects what is contained in the patient's profile, which has also been marked as reviewed to communicate to other providers it is the most up to date version of the patient's current medication therapy.     Current Outpatient Medications:     Alirocumab (Praluent) 75 MG/ML solution auto-injector, Inject 1 mL under the skin into the appropriate area as directed Every 14 (Fourteen) Days., Disp: 6 mL, Rfl: 3    aspirin (aspirin) 81 MG EC tablet, Take 1 tablet by mouth Daily. (Patient not taking: Reported on 1/7/2025), Disp: 30 tablet, Rfl: 1    atorvastatin (LIPITOR) 20 MG tablet, TAKE 1 TABLET BY MOUTH EVERY DAY, Disp: 30 tablet, Rfl: 5    busPIRone (BUSPAR) 5 MG tablet, Take 1 tablet by mouth Daily., Disp: , Rfl:     Cholecalciferol (Vitamin D3) 1.25 MG (80609 UT) capsule, Take 1 capsule by mouth Every 30 (Thirty) Days., Disp: , Rfl:     Diclofenac Sodium (Voltaren Arthritis Pain) 1 % gel gel, Apply 4 g topically to the appropriate area as directed 4 (Four) Times a Day As Needed (joint pain)., Disp: 100 g, Rfl: 3    levETIRAcetam XR (KEPPRA XR) 500 MG tablet, TAKE 2 TABLETS BY MOUTH EVERY NIGHT, Disp: 60 tablet, Rfl: 0    nitroglycerin (Nitrostat) 0.4 MG SL tablet, Place 1 tablet under the tongue Every 5 (Five) Minutes As Needed for Chest Pain. Take no more than 3 doses in 15 minutes., Disp: 12 tablet, Rfl: 12    omeprazole (priLOSEC) 40 MG capsule, TAKE 1 CAPSULE BY MOUTH EVERY DAY, Disp: 30 capsule, Rfl: 0    ondansetron ODT (ZOFRAN-ODT) 4 MG disintegrating tablet, Place 1 tablet on the tongue 4 (Four) Times a Day As Needed for Nausea., Disp: 20 tablet, Rfl: 0    PEG-KCl-NaCl-NaSulf-Na Asc-C (Plenvu) 140 g reconstituted solution solution, Take 140 g by mouth Take As Directed. Take 1 kit orally as directed for colonoscopy prep., Disp: 1 each, Rfl: 0    sertraline (ZOLOFT) 100 MG tablet, Take 1 tablet by mouth 2 (Two) Times a Day., Disp: ,  Rfl:     vitamin D (ERGOCALCIFEROL) 1.25 MG (07390 UT) capsule capsule, Take 1 capsule by mouth Every 30 (Thirty) Days., Disp: , Rfl:     Medicines reviewed by Meme Zavala, PharmD on 3/4/2025 at 10:20 AM    Drug Interactions  None    Initial Education Provided for Specialty Medication  The patient has been provided with the following education and any applicable administration techniques (i.e. self-injection) have been demonstrated for the therapies indicated. All questions and concerns have been addressed prior to the patient receiving the medication, and the patient has verbalized comprehension of the education and any materials provided. Additional patient education shall be provided and documented upon request by the patient, provider, or payer.    PRALUENT® (alirocumab)  Medication Expectations   Why am I taking this medication? You are taking Praluent to lower your “bad” cholesterol (LDL-C). This medication can be used in adults with high blood cholesterol including primary hyperlipidemia and familial hypercholesterolemia. It can help reduce the risk of heart attack and stroke.      What should I expect while on this medication? You should expect to see your cholesterol improve over time. Specifically, you should see your LDL-C decrease.    How does the medication work? Praluent works by blocking a protein called PCSK9 that contributes to high levels of bad cholesterol and it helps increase your liver's ability to remove bad cholesterol from your blood.     How long will I be on this medication for? The amount of time you will be on this medication will be determined by your doctor based on your cholesterol and/or your risk of having a cardiac event. You will most likely be on this medication or another cholesterol medication throughout your lifetime. Do not abruptly stop this medication without talking to your doctor first.    How do I take this medication? Take as directed on your prescription label.  Praluent is injected under the skin (subcutaneously) of your stomach, thigh, or upper arm. This medication is usually given one or twice a month.     What are some possible side effects? The most common side effects of Praluent include redness, itching, swelling, or pain/tenderness at the injection site, symptoms of the common cold, and flu or flu-like symptoms. Praluent can also cause diarrhea and muscle spasms.    What happens if I miss a dose? If you miss a dose, take it as soon as you remember if it is within 7 days from the usual day of administration then resume your original schedule. If it is beyond 7 days, skip the missed dose and resume your normal dosing schedule.      Medication Safety   What are things I should warn my doctor immediately about? Talk to your doctor if you are pregnant, planning to become pregnant, or breastfeeding. Stop the medication and tell your doctor or seek emergency medical help if you notice any signs/symptoms of an allergic reaction (severe rash, redness, hives, severe itching, trouble breathing, or swelling of the face, lips, or tongue). Do not take Praluent if you have an allergy to alirocumab or any of the ingredients in Praluent.    What are things that I should be cautious of? Be cautious of any side effects from this medication. Talk to your doctor if any new ones develop or aren't getting better.   What are some medications that can interact with this one? There are no known significant drug interactions with Praluent. Always tell your doctor or pharmacist immediately if you start taking any new medications, including over-the-counter medications, vitamins, and herbal supplements.      Medication Storage/Handling   How should I handle this medication? Do not shake or expose the pen to extreme heat or direct sunlight. Keep this medication out of reach of pets/children.    How does this medication need to be stored? Store unused pens in the refrigerator in the original  carton to protect from light. Allow medication to warm at room temperature prior to administration. If needed, Praluent may be kept at room temperature in the original carton for up to 30 days. Do not freeze.    How should I dispose of this medication? The device is a single-dose disposable pen and should be discarded in a sharps container after use. The blue caps should also be placed in a sharps container. If you do not own a sharps container, you may use a household container made of heavy-duty plastic with a tight-fitting lid that is leak resistant (e.g., heavty-duty plastic laundry detergent bottle).  If your doctor decides to stop this medication, take to your local police station for proper disposal. Some pharmacies also have take-back bins for medication drop-off.      Resources/Support   How can I remind myself to take this medication? You can download reminder apps to help you manage your refills. You may also set an alarm on your phone to remind you to take your dose.    Is financial support available?  [employer] can provide co-pay cards if you have commercial insurance or patient assistance if you have Medicare or no insurance.    Which vaccines are recommended for me? Talk to your doctor about these vaccines: Flu, Coronavirus (COVID-19), Pneumococcal (pneumonia), Tdap, Hepatitis B, Zoster (shingles)      Adherence and Self-Administration  Adherence related to the patient's specialty therapy was discussed with the patient. The Adherence segment of this outreach has been reviewed and updated.     Is there a concern with patient's ability to self administer the medication correctly and without issue?: No  Were any potential barriers to adherence identified during the initial assessment or patient education?: No  Are there any concerns regarding the patient's understanding of the importance of medication adherence?: No  Methods for Supporting Patient Adherence and/or Self-Administration: None    Open  Medication Therapy Problems  No medication therapy recommendations to display    Goals of Therapy  Goals related to the patient's specialty therapy were discussed with the patient. The Patient Goals segment of this outreach has been reviewed and updated.   Goals Addressed Today        Specialty Pharmacy General Goal      LDL Goal < 70 mg/dL    Lab Results   Component Value Date     (H) 08/16/2024     (H) 09/28/2023     (H) 04/24/2023     (H) 09/12/2022     (H) 03/07/2022                  Reassessment Plan & Follow-Up  1. Medication Therapy Changes: Begin Praluent 75mg SC weekly  2. Related Plans, Therapy Recommendations, or Therapy Problems to Be Addressed: None  3. Pharmacist to perform regular assessments no more than (6) months from the previous assessment.  4. Care Coordinator to set up future refill outreaches, coordinate prescription delivery, and escalate clinical questions to pharmacist.  5. Welcome information and patient satisfaction survey to be sent by specialty pharmacy team with patient's initial fill.    Attestation  Therapeutic appropriateness: Appropriate   I attest the patient was actively involved in and has agreed to the above plan of care. If the prescribed therapy is at any point deemed not appropriate based on the current or future assessments, a consultation will be initiated with the patient's specialty care provider to determine the best course of action. The revised plan of therapy will be documented along with any required assessments and/or additional patient education provided.     Meme Zavala, PharmD, [facility]  Clinical Specialty Pharmacist, Cardiology  3/4/2025  10:22 EST

## 2025-03-05 DIAGNOSIS — Z12.11 ENCOUNTER FOR SCREENING FOR MALIGNANT NEOPLASM OF COLON: Chronic | ICD-10-CM

## 2025-03-06 RX ORDER — POLYETHYLENE GLYCOL 3350, SODIUM SULFATE, SODIUM CHLORIDE, POTASSIUM CHLORIDE, ASCORBIC ACID, SODIUM ASCORBATE 140-9-5.2G
1 KIT ORAL TAKE AS DIRECTED
Qty: 1 EACH | Refills: 0 | Status: SHIPPED | OUTPATIENT
Start: 2025-03-06

## 2025-04-06 DIAGNOSIS — R56.9 PARTIAL SEIZURES: ICD-10-CM

## 2025-04-07 RX ORDER — LEVETIRACETAM 500 MG/1
1000 TABLET, FILM COATED, EXTENDED RELEASE ORAL NIGHTLY
Qty: 60 TABLET | Refills: 0 | Status: SHIPPED | OUTPATIENT
Start: 2025-04-07

## 2025-04-07 NOTE — TELEPHONE ENCOUNTER
Rx Refill Note  Requested Prescriptions     Pending Prescriptions Disp Refills    levETIRAcetam XR (KEPPRA XR) 500 MG tablet [Pharmacy Med Name: levETIRAcetam ER Oral Tablet Extended Release 24 Hour 500 MG] 60 tablet 0     Sig: TAKE 2 TABLETS BY MOUTH AT NIGHT      Last filled:2/28/25 0 ref  Last office visit with prescribing clinician: 8/22/2024      Next office visit with prescribing clinician: 4/29/2025     Tonja Benjamin MA  04/07/25, 14:32 EDT    Sent to pharmacy

## 2025-04-15 ENCOUNTER — ANESTHESIA EVENT (OUTPATIENT)
Dept: GASTROENTEROLOGY | Facility: HOSPITAL | Age: 70
End: 2025-04-15
Payer: MEDICARE

## 2025-04-16 ENCOUNTER — HOSPITAL ENCOUNTER (OUTPATIENT)
Facility: HOSPITAL | Age: 70
Setting detail: HOSPITAL OUTPATIENT SURGERY
Discharge: HOME | End: 2025-04-16
Attending: INTERNAL MEDICINE | Admitting: INTERNAL MEDICINE
Payer: MEDICARE

## 2025-04-16 ENCOUNTER — ANESTHESIA (OUTPATIENT)
Dept: GASTROENTEROLOGY | Facility: HOSPITAL | Age: 70
End: 2025-04-16
Payer: MEDICARE

## 2025-04-16 VITALS
OXYGEN SATURATION: 95 % | BODY MASS INDEX: 30.18 KG/M2 | DIASTOLIC BLOOD PRESSURE: 73 MMHG | HEART RATE: 57 BPM | TEMPERATURE: 97.4 F | RESPIRATION RATE: 20 BRPM | SYSTOLIC BLOOD PRESSURE: 110 MMHG | WEIGHT: 164 LBS | HEIGHT: 62 IN

## 2025-04-16 DIAGNOSIS — Z12.11 ENCOUNTER FOR SCREENING FOR MALIGNANT NEOPLASM OF COLON: ICD-10-CM

## 2025-04-16 PROCEDURE — 25010000002 FENTANYL CITRATE (PF) 50 MCG/ML SOLUTION PREFILLED SYRINGE: Performed by: NURSE ANESTHETIST, CERTIFIED REGISTERED

## 2025-04-16 PROCEDURE — 45385 COLONOSCOPY W/LESION REMOVAL: CPT | Performed by: INTERNAL MEDICINE

## 2025-04-16 PROCEDURE — 25010000002 PROPOFOL 10 MG/ML EMULSION: Performed by: NURSE ANESTHETIST, CERTIFIED REGISTERED

## 2025-04-16 PROCEDURE — 25010000002 LIDOCAINE (CARDIAC): Performed by: NURSE ANESTHETIST, CERTIFIED REGISTERED

## 2025-04-16 PROCEDURE — 25810000003 LACTATED RINGERS PER 1000 ML: Performed by: NURSE ANESTHETIST, CERTIFIED REGISTERED

## 2025-04-16 RX ORDER — FENTANYL CITRATE 50 UG/ML
INJECTION, SOLUTION INTRAMUSCULAR; INTRAVENOUS AS NEEDED
Status: DISCONTINUED | OUTPATIENT
Start: 2025-04-16 | End: 2025-04-16 | Stop reason: SURG

## 2025-04-16 RX ORDER — SIMETHICONE 40MG/0.6ML
SUSPENSION, DROPS(FINAL DOSAGE FORM)(ML) ORAL AS NEEDED
Status: DISCONTINUED | OUTPATIENT
Start: 2025-04-16 | End: 2025-04-16 | Stop reason: HOSPADM

## 2025-04-16 RX ORDER — SODIUM CHLORIDE, SODIUM LACTATE, POTASSIUM CHLORIDE, CALCIUM CHLORIDE 600; 310; 30; 20 MG/100ML; MG/100ML; MG/100ML; MG/100ML
INJECTION, SOLUTION INTRAVENOUS CONTINUOUS PRN
Status: DISCONTINUED | OUTPATIENT
Start: 2025-04-16 | End: 2025-04-16 | Stop reason: SURG

## 2025-04-16 RX ORDER — IPRATROPIUM BROMIDE AND ALBUTEROL SULFATE 2.5; .5 MG/3ML; MG/3ML
3 SOLUTION RESPIRATORY (INHALATION) ONCE AS NEEDED
Status: DISCONTINUED | OUTPATIENT
Start: 2025-04-16 | End: 2025-04-16 | Stop reason: HOSPADM

## 2025-04-16 RX ORDER — PROPOFOL 10 MG/ML
VIAL (ML) INTRAVENOUS AS NEEDED
Status: DISCONTINUED | OUTPATIENT
Start: 2025-04-16 | End: 2025-04-16 | Stop reason: SURG

## 2025-04-16 RX ADMIN — PROPOFOL 100 MCG/KG/MIN: 10 INJECTION, EMULSION INTRAVENOUS at 11:43

## 2025-04-16 RX ADMIN — FENTANYL CITRATE 25 MCG: 50 INJECTION, SOLUTION INTRAMUSCULAR; INTRAVENOUS at 11:46

## 2025-04-16 RX ADMIN — LIDOCAINE HYDROCHLORIDE 60 MG: 20 INJECTION, SOLUTION INTRAVENOUS at 11:46

## 2025-04-16 RX ADMIN — FENTANYL CITRATE 25 MCG: 50 INJECTION, SOLUTION INTRAMUSCULAR; INTRAVENOUS at 11:53

## 2025-04-16 RX ADMIN — SODIUM CHLORIDE, POTASSIUM CHLORIDE, SODIUM LACTATE AND CALCIUM CHLORIDE: 600; 310; 30; 20 INJECTION, SOLUTION INTRAVENOUS at 11:43

## 2025-04-16 RX ADMIN — PROPOFOL 100 MG: 10 INJECTION, EMULSION INTRAVENOUS at 11:46

## 2025-04-16 NOTE — ANESTHESIA POSTPROCEDURE EVALUATION
Patient: Krystina Henley    Procedure Summary       Date: 04/16/25 Room / Location: [facility] ENDOSCOPY 2 / [facility] ENDOSCOPY    Anesthesia Start: 1143 Anesthesia Stop: 1208    Procedure: Colonoscopy with polypectomy (Anus) Diagnosis:       Encounter for screening for malignant neoplasm of colon      (Encounter for screening for malignant neoplasm of colon [Z12.11])    Surgeons: Juan Coreas MD Provider: Saurabh Glynn CRNA    Anesthesia Type: MAC ASA Status: 3            Anesthesia Type: MAC    Vitals  No vitals data found for the desired time range.          Post Anesthesia Care and Evaluation    Patient location during evaluation: PACU  Patient participation: complete - patient participated  Level of consciousness: awake and alert  Pain management: satisfactory to patient    Airway patency: patent  Anesthetic complications: No anesthetic complications  PONV Status: none  Cardiovascular status: acceptable and stable  Respiratory status: acceptable  Hydration status: acceptable    Comments: Vitals signs as noted in nursing documentation as per protocol.

## 2025-04-16 NOTE — ANESTHESIA PREPROCEDURE EVALUATION
Anesthesia Evaluation     Patient summary reviewed and Nursing notes reviewed   history of anesthetic complications:  PONV  NPO Solid Status: > 8 hours  NPO Liquid Status: < 2 hours           Airway   Mallampati: II  TM distance: >3 FB  Neck ROM: full  No difficulty expected  Dental - normal exam     Pulmonary - normal exam   (+) a smoker (10 PACK YEARS) Former, Abstained day of surgery, cigarettes,  Cardiovascular - normal exam    ECG reviewed  PT is on anticoagulation therapy  Rhythm: regular  Rate: normal    (+) hypertension well controlled less than 2 medications, past MI (2022)  >12 months, CAD, CABG >6 Months, angina, hyperlipidemia,  carotid artery disease (LESS 50% STENOSIS BILATERALLY)    ROS comment: March 2021   Anai scan myocardial perfusion imaging  · Left ventricular ejection fraction is hyperdynamic (Calculated EF > 70%). .  · Myocardial perfusion imaging indicates a large-sized, severe area of ischemia located in the anterior wall, apex and septal wall.  · Impressions are consistent with a high risk study.  · Findings consistent with an abnormal ECG stress test.  Cardiac catheterization:  · 90 to 95% ostial LAD stenosis.  · Normal LVEDP  · No aortic stenosis  Bilateral carotid duplex  · Left internal carotid artery stenosis of 50-69%.  · Proximal right internal carotid artery plaque without significant stenosis.  · Right internal carotid artery stenosis of 0-49%.            2023 EKG SR PVC'S  Transthoracic echocardiogram    October 2022 TTE  · Left ventricular ejection fraction appears to be 61 - 65%. Left ventricular systolic function is normal.  · Left ventricular diastolic function is consistent with (grade I) impaired relaxation.    · Left ventricular ejection fraction appears to be 66 - 70%. Left ventricular systolic function is normal.  · Left ventricular diastolic function is consistent with (grade I) impaired relaxation.  · Estimated right ventricular systolic pressure from tricuspid  regurgitation is normal (<35 mmHg).     April 2021: MIDCAB STONE to LAD.     September 2022 carotid duplex  · Right internal carotid artery stenosis of 0-49%.  · Left internal carotid artery stenosis of 50-69%.      Neuro/Psych  (+) seizures (LAST ONE 2024) well controlled, headaches, psychiatric history Anxiety and Depression  GI/Hepatic/Renal/Endo    (+) obesity (BMI 30.41), GERD well controlled    Musculoskeletal (-) negative ROS    Abdominal   (+) obese   Substance History      OB/GYN negative ob/gyn ROS         Other   arthritis,                   Anesthesia Plan    ASA 3     MAC   total IV anesthesia  (Risks and benefits discussed including risk of aspiration, recall and dental damage. All patient questions answered.    Will continue with plan of care.)  intravenous induction     Anesthetic plan, risks, benefits, and alternatives have been provided, discussed and informed consent has been obtained with: patient.    Plan discussed with CRNA.    CODE STATUS:

## 2025-04-16 NOTE — DISCHARGE INSTRUCTIONS
- Discharge patient to home (ambulatory).   - High fiber diet.   - Continue present medications.   - Await pathology results.   - Repeat colonoscopy in 5 years for surveillance.   - Return to GI office in 8 weeks.     No pushing, pulling, tugging,  heavy lifting, or strenuous activity.  No major decision making, driving, or drinking alcoholic beverages for 24 hours. ( due to the medications you have  received)  Always use good hand hygiene/washing techniques.  NO driving while taking pain medications.    * if you have an incision:  Check your incision area every day for signs of infection.   Check for:  * more redness, swelling, or pain  *more fluid or blood  *warmth  *pus or bad smellRest today  No pushing,pulling,tugging,heavy lifting, or strenuous activity   No major decision making,driving,or drinking alcoholic beverages for 24 hours due to the sedation you received  Always use good hand hygiene/washing technique  No driving on pain medication.To assist you in voiding:  Drink plenty of fluids  Listen to running water while attempting to void.    If you are unable to urinate and you have an uncomfortable urge to void or it has been   6 hours since you were discharged, return to the Emergency Room

## 2025-04-16 NOTE — H&P
[facility]  HISTORY AND PHYSICAL    Patient Name: Krystina Henley  : 1955  MRN: [id number]    Chief Complaint:   For surveillance colonoscopy    History Of Presenting Illness:    Histoy of advanced polyp removed    Last colon     Past Medical History:   Diagnosis Date    Abnormal ECG [facility]    Anemia     Anxiety     Arrhythmia     Carotid artery occlusion     Colon polyp 2014    Coronary artery disease 2021    Depression     Diverticulosis     Dog bite     arms, face, legs    Ear piercing     Elevated cholesterol     Essential hypertension     patient states she does not have hypertension    Fibroid     uterine    GERD (gastroesophageal reflux disease)     Headache, tension-type     Heartburn     Hiatal hernia     History of nuclear stress test 2021    History of transfusion     no adverse reactions    HL (hearing loss)     Hx of echocardiogram 2021    Hx of exercise stress test     Hyperlipidemia     Myocardial infarction [place]    x -    Osteoarthritis     generalized    Osteomyelitis     PONV (postoperative nausea and vomiting)     Seasonal allergies     Seizures     last seizure was in     Tinnitus     Wears glasses     reading only       Past Surgical History:   Procedure Laterality Date    BLADDER SLING MODIFIED, ANTERIOR AND POSTERIOR VAGINAL REPAIR      BREAST BIOPSY Bilateral     Excisional biopsies - many years ago    CARDIAC CATHETERIZATION N/A 2021    Procedure: Left Heart Cath;  Surgeon: Ernesto Corona III, MD;  Location:  [facility];  Service: Cardiovascular;  Laterality: N/A;    CARDIAC CATHETERIZATION N/A 2021    Procedure: Percutaneous Coronary Intervention;  Surgeon: Fredrick Juares MD;  Location:  [facility];  Service: Cardiovascular;  Laterality: N/A;    CARDIAC SURGERY      COLONOSCOPY  2014    COLONOSCOPY N/A 2020    Procedure: COLONOSCOPY WITH BIOPSY AND HOT SNARE  POLYPECTOMY;  Surgeon: Juan Coreas MD;  Location: [facility] ENDOSCOPY;  Service: Gastroenterology;  Laterality: N/A;    COLONOSCOPY N/A 2021    Procedure: COLONOSCOPY WITH COLD BIOPSY POLYPECTOMY;  Surgeon: Juan Coreas MD;  Location: [facility] ENDOSCOPY;  Service: Gastroenterology;  Laterality: N/A;    COLONOSCOPY N/A 2022    Procedure: COLONOSCOPY ;  Surgeon: Juan Coreas MD;  Location: [facility] ENDOSCOPY;  Service: Gastroenterology;  Laterality: N/A;    CORONARY ARTERY BYPASS GRAFT  2021    ENDOSCOPY N/A 2020    Procedure: ESOPHAGOGASTRODUODENOSCOPY;  Surgeon: Marito Abdi MD;  Location: [facility] ENDOSCOPY;  Service: Gastroenterology;  Laterality: N/A;    ENDOSCOPY N/A 2020    Procedure: ESOPHAGOGASTRODUODENOSCOPY;  Surgeon: Marito Abdi MD;  Location: [facility] ENDOSCOPY;  Service: Gastroenterology;  Laterality: N/A;    ENDOSCOPY N/A 2022    Procedure: ESOPHAGOGASTRODUODENOSCOPY WITH BIOPSY;  Surgeon: Juan Coreas MD;  Location: [facility] ENDOSCOPY;  Service: Gastroenterology;  Laterality: N/A;    FINGER SURGERY Left     Index finger    HYSTERECTOMY      still has one ovary on left side;  Unknown year    UPPER GASTROINTESTINAL ENDOSCOPY  2014    VAGINAL DELIVERY      x3    WISDOM TOOTH EXTRACTION         Social History     Socioeconomic History    Marital status:    Tobacco Use    Smoking status: Former     Current packs/day: 0.00     Average packs/day: 1 pack/day for 10.0 years (10.0 ttl pk-yrs)     Types: Cigarettes     Start date: 2006     Quit date: 2016     Years since quittin.7     Passive exposure: Never    Smokeless tobacco: Never    Tobacco comments:     Intermittent   Vaping Use    Vaping status: Never Used   Substance and Sexual Activity    Alcohol use: Not Currently     Alcohol/week: 3.0 standard drinks of alcohol     Types: 3 Glasses of wine per week    Drug use: Never    Sexual activity: Defer       Family  History   Problem Relation Age of Onset    Lung cancer Mother     Anxiety disorder Mother     Lung cancer Father     Cancer Father     Prostate cancer Brother         x 3 brothers    Heart attack Brother         x 2 brothers     Heart disease Brother     Colon cancer Neg Hx     Breast cancer Neg Hx     Ovarian cancer Neg Hx        Prior to Admission Medications:  Medications Prior to Admission   Medication Sig Dispense Refill Last Dose/Taking    Alirocumab (Praluent) 75 MG/ML solution auto-injector Inject 1 mL under the skin into the appropriate area as directed Every 14 (Fourteen) Days. 6 mL 3 Past Month    atorvastatin (LIPITOR) 20 MG tablet TAKE 1 TABLET BY MOUTH EVERY DAY 30 tablet 5 4/15/2025    busPIRone (BUSPAR) 5 MG tablet Take 1 tablet by mouth Daily.   4/15/2025    Cholecalciferol (Vitamin D3) 1.25 MG (11509 UT) capsule Take 1 capsule by mouth Every 30 (Thirty) Days.   4/15/2025    Diclofenac Sodium (Voltaren Arthritis Pain) 1 % gel gel Apply 4 g topically to the appropriate area as directed 4 (Four) Times a Day As Needed (joint pain). 100 g 3 4/15/2025    levETIRAcetam XR (KEPPRA XR) 500 MG tablet TAKE 2 TABLETS BY MOUTH AT NIGHT 60 tablet 0 4/15/2025    omeprazole (priLOSEC) 40 MG capsule TAKE 1 CAPSULE BY MOUTH EVERY DAY 30 capsule 0 4/15/2025    ondansetron ODT (ZOFRAN-ODT) 4 MG disintegrating tablet Place 1 tablet on the tongue 4 (Four) Times a Day As Needed for Nausea. 20 tablet 0 4/15/2025    PEG-KCl-NaCl-NaSulf-Na Asc-C (Plenvu) 140 g reconstituted solution solution Take 140 g by mouth Take As Directed. Take 1 kit orally as directed for colonoscopy prep. 1 each 0 4/16/2025 Morning    sertraline (ZOLOFT) 100 MG tablet Take 2 tablets by mouth Every Night.   4/15/2025    vitamin D (ERGOCALCIFEROL) 1.25 MG (60176 UT) capsule capsule Take 1 capsule by mouth Every 30 (Thirty) Days.   Past Week    aspirin (aspirin) 81 MG EC tablet Take 1 tablet by mouth Daily. (Patient not taking: Reported on 1/7/2025) 30  tablet 1 4/13/2025    nitroglycerin (Nitrostat) 0.4 MG SL tablet Place 1 tablet under the tongue Every 5 (Five) Minutes As Needed for Chest Pain. Take no more than 3 doses in 15 minutes. 12 tablet 12 More than a month       Allergies:  No Known Allergies     Vitals: Temp:  [97.6 °F (36.4 °C)] 97.6 °F (36.4 °C)  Heart Rate:  [74] 74  Resp:  [14] 14  BP: (133)/(88) 133/88    Review Of Systems:  Constitutional:  Negative for chills, fever, and unexpected weight change.  Respiratory:  Negative for cough, chest tightness, shortness of breath, and wheezing.  Cardiovascular:  Negative for chest pain, palpitations, and leg swelling.  Gastrointestinal:  Negative for abdominal distention, abdominal pain, nausea, vomiting.  Neurological:  Negative for weakness, numbness, and headaches.     Physical Exam:    General Appearance:  Alert, cooperative, in no acute distress.   Lungs:   Clear to auscultation, respirations regular, even and                 unlabored.   Heart:  Regular rhythm and normal rate.   Abdomen:   Normal bowel sounds, no masses, no organomegaly. Soft, nontender, nondistended   Neurologic: Alert and oriented x 3. Moves all four limbs equally       Assessment & Plan     Assessment:  Principal Problem:    Encounter for screening for malignant neoplasm of colon      Plan: Colonoscopy with possible biopsy, polypectomy, ablation of arteriovenous malformations, or control of bleeding. (N/A)     Juan Coreas MD  4/16/2025

## 2025-04-16 NOTE — ADDENDUM NOTE
Addendum  created 04/16/25 1250 by Dolores Andrews, CRNA    Review and Sign - Ready for Procedure

## 2025-04-21 LAB — REF LAB TEST METHOD: NORMAL

## 2025-04-29 ENCOUNTER — OFFICE VISIT (OUTPATIENT)
Dept: NEUROLOGY | Facility: CLINIC | Age: 70
End: 2025-04-29
Payer: MEDICARE

## 2025-04-29 ENCOUNTER — LAB (OUTPATIENT)
Dept: LAB | Facility: HOSPITAL | Age: 70
End: 2025-04-29
Payer: MEDICARE

## 2025-04-29 VITALS — SYSTOLIC BLOOD PRESSURE: 126 MMHG | DIASTOLIC BLOOD PRESSURE: 74 MMHG | HEART RATE: 62 BPM | OXYGEN SATURATION: 98 %

## 2025-04-29 DIAGNOSIS — R56.9 PARTIAL SEIZURES: ICD-10-CM

## 2025-04-29 DIAGNOSIS — R56.9 PARTIAL SEIZURES: Primary | ICD-10-CM

## 2025-04-29 PROCEDURE — 36415 COLL VENOUS BLD VENIPUNCTURE: CPT

## 2025-04-29 PROCEDURE — 80053 COMPREHEN METABOLIC PANEL: CPT

## 2025-04-29 RX ORDER — LEVETIRACETAM 500 MG/1
1000 TABLET, FILM COATED, EXTENDED RELEASE ORAL NIGHTLY
Qty: 60 TABLET | Refills: 5 | Status: SHIPPED | OUTPATIENT
Start: 2025-04-29

## 2025-04-29 NOTE — PROGRESS NOTES
Neuro Office Visit      Encounter Date: 2025   Patient Name: Krystina Henley  : 1955   MRN: [id number]   PCP:  Salty Pardo MD     Chief Complaint:    Chief Complaint   Patient presents with    Seizures       History of Present Illness: Krystina Henley is a 69 y.o. female who is here today in Neurology for  Partial seizures    Krystina Henley is a 67 y.o. female who is here today in Neurology for episodes of amnesia.  Referred by Dr. Ernesto Corona. She has a past medical history of HTN, HLD, CAD, CABG, conductive hearing loss bilateral ears, tinnitus both ears, vitamin D deficiency, GERD, urinary incontinence, tubulovillous adenoma, anxiety, depression, osteomyelitis of finger of left hand.  She reports that she has had intermittent episodes of amnesia for approximately a year which are often associated with periods of high anxiety.  She tells me that her primary care has started her on Zoloft which significantly helped with the anxiety but then caused tremors so she had the dose decreased and that she is working with primary care to address antianxiety medications.  Her daughter is here with her today and states that when she has the 6 episodes of extreme fear she often will be amnestic surrounding the event.  They recall specific time in 2022 when she was swimming with [employer] and that she cannot remember this day at all, daughter reports about 2-3 of these episodes where she has complete amnesia surrounding an event.  They have also noticed that overall her memory is declining.   She has noted symptoms since at least  marked initially by forgetfulness . This has gradually worsened  over time. Additional symptoms have included impairments in short term memory and learning new things . There have been associated  symptoms of anxiety  and depression . She denies  impairments in ADL's. She manages her medications  and finances. She continues to drive.  . She is  currently residing at home with daughter.      Family History: None  Personal Neurological History: No history of stroke or seizure  Education & Work History: RN  Psychological History: Anxiety and Depression  Sleep Habits & History: Affected by sleep  Chronic Pain: None  Hearing or Vision Impairments: Mild hearing   Personal History of Cancer: None     She follows with Dr. Corona for carotid artery stenosis who per his most recent documentation plans on annual ultrasound evaluation along with aggressive risk factor modification  9/12/2022  CUS: Proximal right internal carotid artery plaque without significant stenosis.  Right internal carotid artery stenosis of 0-49%.  Left internal carotid artery stenosis of 50-69%.        Follow up  visit 9/28/2023:  Krystina Henley returns to neurology clinic for continued evaluation of MCI - MRI brain was performed on 6/13/2023 and showed parenchymal volume loss greatest in the temporal regions, probable chronic small vessel ischemic change.  EEG performed on 6/13/2023 impression stated left anterior temporal sharp waves, per impression sharp waves noted are abnormal but not entirely specific, may be seen in setting of partial seizures.  These findings were reviewed with Dr. Rosales and patient was started on Keppra 500 mg twice daily. TSH 2.580, CMP within normal limits with exception of CO2 29.4, additional lab work pending.  Prior MoCA 25 out of 30.     In clinic today she reports that she is no longer having loss of time episodes, denies any more amnestic events. She denies SE from Keppra 500 mg BID. She feels that her memory has improved. She is not losing time like she was before. She reports that if she misses a dose she notices change in her memory, she is now compliant with Keppra 500 mg BID. She reports minimal to  no alcohol use. No history of closed head injury. No family history of seizures. No history of being born prematurely or of issues achieving developmental  milestones growing up.         Follow up visit 2/19/2024:  Krystina Henley returns to neurology clinic for continued evaluation of partial seizures. EEG performed on 6/13/2023 impression stated left anterior temporal sharp waves, per impression sharp waves noted are abnormal but not entirely specific, may be seen in setting of partial seizures.  These findings were reviewed with Dr. Rosales and patient was started on Keppra 500 mg twice daily for partial seizures. She states that since she has been taking Keppra she has not had any more amnestic spells that she knows of. She also notes that overall her memory has improved some, she feels that she is able to process new information more easily. Vitamin B12 was 564, methylmalonic acid level was normal. RPR was negative. Folate was normal. MOCA previously was 26/30. She does note some daytime sleepiness as SE from Keppra but reports that this is manageable, she sleeps about 9 hours each night. She also reports some joint pain since starting Atorvastatin.     Follow up visit 8/22/2024:  Krystina Henley returns to neurology clinic for continued evaluation of partial seizures, she reports that she has been taking Keppra 500 mg BID without return of seizures. No missed doses of medication. She continues to work as RN. She denies losing periods of time and feels that compared to how she was doing prior to taking Keppra her memory has significantly improved. Feels that mood is stable on Buspar 5 mg daily plus Zoloft 100 mg BID. She is sleeping well at night. She does note persistent sleepiness/grogginess during the day. She continues to follow with Dr. Corona for carotid atherosclerosis with plan for yearly carotid ultrasound and aggressive risk factor modification - she is taking Lipitor 20 mg daily.     Current visit 4/29/2025:  Krystina returns for routine follow up of partial seizures (MRI brain was performed on 6/13/2023 showed parenchymal volume loss greatest in the temporal  regions, probable chronic small vessel ischemic change.  EEG performed on 6/13/2023 impression stated left anterior temporal sharp waves, per impression sharp waves noted are abnormal but not entirely specific, may be seen in setting of partial seizures). She is taking Keppra XR 1000 mg nightly. Memory has stayed stable. Denies breakthrough seizures. She retired in October 2024. She does note some mild daytime fatigue but is able to complete routine tasks. She questions if this may also be from Zoloft or Buspar. She denies any questions or concerns today.     Subjective      Review of Systems   Constitutional:  Positive for fatigue.   HENT: Negative.     Respiratory: Negative.     Gastrointestinal: Negative.    Genitourinary: Negative.    Skin: Negative.    Neurological:  Negative for seizures.   Psychiatric/Behavioral:  Negative for sleep disturbance.           Past Medical History:   Past Medical History:   Diagnosis Date    Abnormal ECG Ukn    Anemia     Anxiety     Arrhythmia     Carotid artery occlusion 2021    Colon polyp 01/16/2014    Coronary artery disease 04/2021    Depression     Diverticulosis     Dog bite     arms, face, legs    Ear piercing     Elevated cholesterol     Essential hypertension     patient states she does not have hypertension    Fibroid     uterine    GERD (gastroesophageal reflux disease)     Headache, tension-type     Heartburn     Hiatal hernia     History of nuclear stress test 03/05/2021    History of transfusion     no adverse reactions    HL (hearing loss)     Hx of echocardiogram 03/18/2021    Hx of exercise stress test     Hyperlipidemia     Myocardial infarction [place]    x 1-2022    Osteoarthritis     generalized    Osteomyelitis     PONV (postoperative nausea and vomiting)     Seasonal allergies     Seizures     last seizure was in 2024    Tinnitus     Ulcer     Wears glasses     reading only       Past Surgical History:   Past Surgical History:   Procedure Laterality Date     ABDOMINAL SURGERY      BLADDER SLING MODIFIED, ANTERIOR AND POSTERIOR VAGINAL REPAIR      BREAST BIOPSY Bilateral     Excisional biopsies - many years ago    CARDIAC CATHETERIZATION N/A 03/18/2021    Procedure: Left Heart Cath;  Surgeon: Ernesto Corona III, MD;  Location:  [facility];  Service: Cardiovascular;  Laterality: N/A;    CARDIAC CATHETERIZATION N/A 03/18/2021    Procedure: Percutaneous Coronary Intervention;  Surgeon: Fredrick Juares MD;  Location:  [facility];  Service: Cardiovascular;  Laterality: N/A;    CARDIAC SURGERY  2021    COLONOSCOPY  01/16/2014    COLONOSCOPY N/A 07/31/2020    Procedure: COLONOSCOPY WITH BIOPSY AND HOT SNARE POLYPECTOMY;  Surgeon: Jaun Coreas MD;  Location: [facility] ENDOSCOPY;  Service: Gastroenterology;  Laterality: N/A;    COLONOSCOPY N/A 01/25/2021    Procedure: COLONOSCOPY WITH COLD BIOPSY POLYPECTOMY;  Surgeon: Juan Coreas MD;  Location: [facility] ENDOSCOPY;  Service: Gastroenterology;  Laterality: N/A;    COLONOSCOPY N/A 01/21/2022    Procedure: COLONOSCOPY ;  Surgeon: Juan Coreas MD;  Location: [facility] ENDOSCOPY;  Service: Gastroenterology;  Laterality: N/A;    COLONOSCOPY N/A 04/16/2025    Procedure: Colonoscopy with polypectomy;  Surgeon: Juan Coreas MD;  Location: [facility] ENDOSCOPY;  Service: Gastroenterology;  Laterality: N/A;    CORONARY ARTERY BYPASS GRAFT  04/2021    ENDOSCOPY N/A 02/19/2020    Procedure: ESOPHAGOGASTRODUODENOSCOPY;  Surgeon: Marito Abdi MD;  Location: [facility] ENDOSCOPY;  Service: Gastroenterology;  Laterality: N/A;    ENDOSCOPY N/A 06/03/2020    Procedure: ESOPHAGOGASTRODUODENOSCOPY;  Surgeon: Marito Abdi MD;  Location: [facility] ENDOSCOPY;  Service: Gastroenterology;  Laterality: N/A;    ENDOSCOPY N/A 04/25/2022    Procedure: ESOPHAGOGASTRODUODENOSCOPY WITH BIOPSY;  Surgeon: Juan Coreas MD;  Location: [facility] ENDOSCOPY;  Service: Gastroenterology;  Laterality:  N/A;    FINGER SURGERY Left     Index finger    HERNIA REPAIR      HYSTERECTOMY      still has one ovary on left side;  Unknown year    TUBAL ABDOMINAL LIGATION      UPPER GASTROINTESTINAL ENDOSCOPY  2014    VAGINAL DELIVERY      x3    WISDOM TOOTH EXTRACTION         Family History:   Family History   Problem Relation Age of Onset    Lung cancer Mother     Anxiety disorder Mother     Lung cancer Father     Cancer Father     Prostate cancer Brother         x 3 brothers    Heart attack Brother         x 2 brothers     Heart disease Brother     Heart disease Brother     Colon cancer Neg Hx     Breast cancer Neg Hx     Ovarian cancer Neg Hx        Social History:   Social History     Socioeconomic History    Marital status:    Tobacco Use    Smoking status: Former     Current packs/day: 0.00     Average packs/day: 1 pack/day for 10.0 years (10.0 ttl pk-yrs)     Types: Cigarettes     Start date: 2006     Quit date: 2016     Years since quittin.7     Passive exposure: Never    Smokeless tobacco: Never    Tobacco comments:     Intermittent   Vaping Use    Vaping status: Never Used   Substance and Sexual Activity    Alcohol use: Yes     Alcohol/week: 3.0 standard drinks of alcohol     Types: 3 Glasses of wine per week    Drug use: Never    Sexual activity: Not Currently     Partners: Male     Birth control/protection: Post-menopausal       Medications:     Current Outpatient Medications:     Alirocumab (Praluent) 75 MG/ML solution auto-injector, Inject 1 mL under the skin into the appropriate area as directed Every 14 (Fourteen) Days., Disp: 6 mL, Rfl: 3    aspirin (aspirin) 81 MG EC tablet, Take 1 tablet by mouth Daily., Disp: 30 tablet, Rfl: 1    atorvastatin (LIPITOR) 20 MG tablet, TAKE 1 TABLET BY MOUTH EVERY DAY, Disp: 30 tablet, Rfl: 5    busPIRone (BUSPAR) 5 MG tablet, Take 1 tablet by mouth Daily., Disp: , Rfl:     Diclofenac Sodium (Voltaren Arthritis Pain) 1 % gel gel, Apply 4 g  topically to the appropriate area as directed 4 (Four) Times a Day As Needed (joint pain)., Disp: 100 g, Rfl: 3    levETIRAcetam XR (KEPPRA XR) 500 MG tablet, Take 2 tablets by mouth Every Night., Disp: 60 tablet, Rfl: 5    nitroglycerin (Nitrostat) 0.4 MG SL tablet, Place 1 tablet under the tongue Every 5 (Five) Minutes As Needed for Chest Pain. Take no more than 3 doses in 15 minutes., Disp: 12 tablet, Rfl: 12    omeprazole (priLOSEC) 40 MG capsule, TAKE 1 CAPSULE BY MOUTH EVERY DAY, Disp: 30 capsule, Rfl: 0    ondansetron ODT (ZOFRAN-ODT) 4 MG disintegrating tablet, Place 1 tablet on the tongue 4 (Four) Times a Day As Needed for Nausea., Disp: 20 tablet, Rfl: 0    sertraline (ZOLOFT) 100 MG tablet, Take 2 tablets by mouth Every Night., Disp: , Rfl:     vitamin D (ERGOCALCIFEROL) 1.25 MG (56467 UT) capsule capsule, Take 1 capsule by mouth Every 30 (Thirty) Days., Disp: , Rfl:     Allergies:   No Known Allergies      [facility] Fall Risk Assessment was completed, and patient is at HIGH risk for falls. Assessment completed on:4/29/2025    Objective     Objective:    /74   Pulse 62   LMP  (LMP Unknown)   SpO2 98%       Physical Exam  Vitals reviewed.   Constitutional:       Appearance: Normal appearance.   HENT:      Head: Normocephalic and atraumatic.      Mouth/Throat:      Mouth: Mucous membranes are moist.      Pharynx: Oropharynx is clear.   Pulmonary:      Effort: Pulmonary effort is normal. No respiratory distress.   Skin:     General: Skin is warm and dry.   Neurological:      Mental Status: She is alert.          Neurology Exam:    General apperance: NAD.     Mental status: Alert, awake and oriented to time place and person.    Fund of knowledge:  Normal.     Language and Speech: No aphasia or dysarthria.    Naming , Repetition and Comprehension:  Can name objects, repeat a sentence and follow commands. Speech is clear and fluent with good repetition, comprehension, and naming.    Cranial Nerves:   CN  II: Visual fields are full. Intact. Pupils - PERRLA  CN III, IV and VI: Extraocular movements are intact. Normal saccades.   CN V: Facial sensation is intact.   CN VII: Muscles of facial expression reveal no asymmetry. Intact.   CN VIII: Hearing is intact.   CN IX and X: Palate elevates symmetrically. Intact  CN XI: Shoulder shrug is intact.   CN XII: Tongue is midline without evidence of atrophy or fasciculation.     Motor:  Right UE muscle strength 5/5. Normal tone.     Left UE muscle strength 5/5. Normal tone.      Right LE muscle strength 5/5. Normal tone.     Left LE muscle strength 5/5. Normal tone.      Sensory: Normal light touch sensation bilaterally.    DTRs: 2+ bilaterally in upper and lower extremities.    Coordination: Normal finger-to-nose    Gait: Normal.          Results:   Imaging:   CT chest for pulmonary embolus    Result Date: 4/24/2024  No PE or dissection . Small hiatal hernia with wall thickening of the distal esophagus. 6 mm right upper lobe noncalcified nodule with vague nodular densities just above the minor fissure. A 3-6 month follow-up chest CT is recommended. Images reviewed, interpreted, and dictated by Dr. Rayray Barger. Transcribed by ELLIOT Kimble(TELLY).    XR Chest 1 View    Result Date: 4/24/2024  No acute cardiopulmonary process. Images reviewed, interpreted, and dictated by Dr. Rayray Baregr. Transcribed by Meme Bush PA-C.       Labs:   Lab Results   Component Value Date    GLUCOSE 87 09/28/2023    BUN 22 09/28/2023    CREATININE 0.92 09/28/2023     09/28/2023    K 4.1 09/28/2023     09/28/2023    CALCIUM 9.5 09/28/2023    PROTEINTOT 7.9 09/28/2023    ALBUMIN 4.7 09/28/2023    ALT 21 09/28/2023    AST 28 09/28/2023    ALKPHOS 100 09/28/2023    BILITOT 0.2 09/28/2023    GLOB 3.2 09/28/2023    AGRATIO 1.5 09/28/2023    BCR 23.9 09/28/2023    ANIONGAP 10.0 09/28/2023    EGFR 68.0 09/28/2023         Assessment / Plan      Assessment/Plan:   Diagnoses and  all orders for this visit:    1. Partial seizures (Primary)  -     Comprehensive Metabolic Panel; Future  -     levETIRAcetam XR (KEPPRA XR) 500 MG tablet; Take 2 tablets by mouth Every Night.  Dispense: 60 tablet; Refill: 5       Krystina returns for routine follow up. She has been doing well overall on Keppra XR 1000 mg nightly. Will spot check CMP today to ensure no renal dose adjustments need to be made, otherwise will continue current treatment regimen unchanged and plan to see back in 6 months.       Patient Education:       Reviewed medications, potential side effects and signs and symptoms to report. Discussed risk versus benefits of treatment plan with patient and/or family-including medications, labs and radiology that may be ordered. Addressed questions and concerns during visit. Patient and/or family verbalized understanding and agree with plan. Instructed to call the office with any questions.     Follow Up:   Return in about 6 months (around 10/29/2025).    I spent 30  minutes in the care of this patient. I personally spent 50 percent of this time counseling and discussing diagnosis, diagnostic testing, driving, treatment options, and management .       During this visit the following were done:  Labs Reviewed []    Labs Ordered [x]    Radiology Reports Reviewed []    Radiology Ordered []    PCP Records Reviewed []    Referring Provider Records Reviewed []    ER Records Reviewed []    Hospital Records Reviewed []    History Obtained From Family []    Radiology Images Reviewed []    Other Reviewed []    Records Requested []      PETTY Navas  [facility]  [address] [facility] [address]  [facility] [address] [phone]

## 2025-04-30 LAB
ALBUMIN SERPL-MCNC: 3.9 G/DL (ref 3.5–5.2)
ALBUMIN/GLOB SERPL: 1.2 G/DL
ALP SERPL-CCNC: 80 U/L (ref 39–117)
ALT SERPL W P-5'-P-CCNC: 21 U/L (ref 1–33)
ANION GAP SERPL CALCULATED.3IONS-SCNC: 10.8 MMOL/L (ref 5–15)
AST SERPL-CCNC: 26 U/L (ref 1–32)
BILIRUB SERPL-MCNC: <0.2 MG/DL (ref 0–1.2)
BUN SERPL-MCNC: 19 MG/DL (ref 8–23)
BUN/CREAT SERPL: 20.4 (ref 7–25)
CALCIUM SPEC-SCNC: 9 MG/DL (ref 8.6–10.5)
CHLORIDE SERPL-SCNC: 105 MMOL/L (ref 98–107)
CO2 SERPL-SCNC: 26.2 MMOL/L (ref 22–29)
CREAT SERPL-MCNC: 0.93 MG/DL (ref 0.57–1)
EGFRCR SERPLBLD CKD-EPI 2021: 66.3 ML/MIN/1.73
GLOBULIN UR ELPH-MCNC: 3.3 GM/DL
GLUCOSE SERPL-MCNC: 77 MG/DL (ref 65–99)
POTASSIUM SERPL-SCNC: 4.3 MMOL/L (ref 3.5–5.2)
PROT SERPL-MCNC: 7.2 G/DL (ref 6–8.5)
SODIUM SERPL-SCNC: 142 MMOL/L (ref 136–145)

## 2025-05-27 ENCOUNTER — SPECIALTY PHARMACY (OUTPATIENT)
Dept: CARDIOLOGY | Facility: CLINIC | Age: 70
End: 2025-05-27
Payer: MEDICARE

## 2025-05-27 NOTE — PROGRESS NOTES
Specialty Pharmacy Patient Management Program  Cardiology Specialty Pharmacy Refill Outreach      Krystina is a 69 y.o. female contacted today regarding refills of her medication(s).    Specialty medication(s) and dose(s) confirmed: Praluent 75mg  Other medications being refilled: n/a    Refill Questions      Flowsheet Row Most Recent Value   Changes to allergies? No   Changes to medications? No   New conditions or infections since last clinic visit No   Unplanned office visit, urgent care, ED, or hospital admission in the last 4 weeks  No   How does patient/caregiver feel medication is working? Very good   Financial problems or insurance changes  No   Since the previous refill, were any specialty medication doses or scheduled injections missed or delayed?  No   Does this patient require a clinical escalation to a pharmacist? No            Delivery Questions      Flowsheet Row Most Recent Value   Delivery method [employer]   Delivery address verified with patient/caregiver? Yes   Delivery address Home   Number of medications in delivery 1   Medication(s) being filled and delivered Alirocumab (Praluent)   Doses left of specialty medications 0 - next dose due on 5/29   Copay verified? Yes   Copay amount $0   Copay form of payment No copayment ($0)   Delivery Date Selection 05/28/25   Signature Required No   Do you consent to receive electronic handouts?  No                   Follow-up: 84 days     Justin Casas CPhT-Adv  Pharmacy Care Coordinator  5/27/2025  09:32 EDT

## 2025-05-29 ENCOUNTER — TRANSCRIBE ORDERS (OUTPATIENT)
Dept: ADMINISTRATIVE | Facility: HOSPITAL | Age: 70
End: 2025-05-29
Payer: MEDICARE

## 2025-05-29 DIAGNOSIS — Z12.31 VISIT FOR SCREENING MAMMOGRAM: Primary | ICD-10-CM

## 2025-08-12 ENCOUNTER — OFFICE VISIT (OUTPATIENT)
Dept: GASTROENTEROLOGY | Facility: CLINIC | Age: 70
End: 2025-08-12
Payer: MEDICARE

## 2025-08-12 VITALS
TEMPERATURE: 97.8 F | BODY MASS INDEX: 31.04 KG/M2 | HEIGHT: 61 IN | WEIGHT: 164.4 LBS | DIASTOLIC BLOOD PRESSURE: 80 MMHG | OXYGEN SATURATION: 96 % | SYSTOLIC BLOOD PRESSURE: 116 MMHG | HEART RATE: 69 BPM

## 2025-08-12 DIAGNOSIS — Z86.0100 PERSONAL HISTORY OF COLON POLYPS, UNSPECIFIED: ICD-10-CM

## 2025-08-12 DIAGNOSIS — R11.0 NAUSEA: Chronic | ICD-10-CM

## 2025-08-12 DIAGNOSIS — R13.10 DYSPHAGIA, UNSPECIFIED TYPE: Chronic | ICD-10-CM

## 2025-08-12 DIAGNOSIS — Z12.11 ENCOUNTER FOR SCREENING FOR MALIGNANT NEOPLASM OF COLON: ICD-10-CM

## 2025-08-12 DIAGNOSIS — K21.9 GASTROESOPHAGEAL REFLUX DISEASE, UNSPECIFIED WHETHER ESOPHAGITIS PRESENT: Chronic | ICD-10-CM

## 2025-08-12 DIAGNOSIS — R19.7 DIARRHEA, UNSPECIFIED TYPE: Primary | Chronic | ICD-10-CM

## 2025-08-14 ENCOUNTER — SPECIALTY PHARMACY (OUTPATIENT)
Dept: CARDIOLOGY | Facility: CLINIC | Age: 70
End: 2025-08-14
Payer: MEDICARE

## 2025-08-29 ENCOUNTER — SPECIALTY PHARMACY (OUTPATIENT)
Dept: GENERAL RADIOLOGY | Facility: HOSPITAL | Age: 70
End: 2025-08-29
Payer: MEDICARE

## (undated) DEVICE — Device

## (undated) DEVICE — ESOPHAGEAL BALLOON DILATATION CATHETER: Brand: CRE FIXED WIRE

## (undated) DEVICE — SUCTION CANISTER, 1500CC, RIGID: Brand: DEROYAL

## (undated) DEVICE — CATH DIAG EXPO .045 FL3  5F 100CM

## (undated) DEVICE — VLV SXN AIR/H2O ORCAPOD3 1P/U STRL

## (undated) DEVICE — DEV INFL ALLIANCE2 SYS

## (undated) DEVICE — FRCP BIOP COLD ENDOJAW ALLGTR W/NDL 2.8X2300MM BLU

## (undated) DEVICE — QUICK CATCH IN-LINE SUCTION POLYP TRAP IS USED FOR SUCTION RETRIEVAL OF ENDOSCOPICALLY REMOVED POLYPS.

## (undated) DEVICE — CANISTER, RIGID, 2000CC: Brand: MEDLINE INDUSTRIES, INC.

## (undated) DEVICE — LUBE JELLY PK/2.75GM STRL BX/144

## (undated) DEVICE — ENDOSCOPY PORT CONNECTOR FOR OLYMPUS® SCOPES: Brand: ERBE

## (undated) DEVICE — GW PERIPH GUIDERIGHT STD/EXCHNG/J/TIP SS 0.035IN 5X260CM

## (undated) DEVICE — HYBRID TUBING/CAP SET FOR OLYMPUS® SCOPES: Brand: ERBE

## (undated) DEVICE — SINGLE-USE POLYPECTOMY SNARE: Brand: CAPTIVATOR II

## (undated) DEVICE — Device: Brand: DEFENDO AIR/WATER/SUCTION AND BIOPSY VALVE

## (undated) DEVICE — MODEL AT P65, P/N 701554-001KIT CONTENTS: HAND CONTROLLER, 3-WAY HIGH-PRESSURE STOPCOCK WITH ROTATING END AND PREMIUM HIGH-PRESSURE TUBING: Brand: ANGIOTOUCH® KIT

## (undated) DEVICE — HYBRID CO2 TUBING/CAP SET FOR OLYMPUS® SCOPES & CO2 SOURCE: Brand: ERBE

## (undated) DEVICE — CONMED SCOPE SAVER BITE BLOCK, 20X27 MM: Brand: SCOPE SAVER

## (undated) DEVICE — CATH DIAG EXPO M/ PK 5F FL4/FR4 PIG

## (undated) DEVICE — SKIN PREP TRAY W/CHG: Brand: MEDLINE INDUSTRIES, INC.

## (undated) DEVICE — PK CATH CARD 10

## (undated) DEVICE — KT VLV HEMO MAP ACC PLS LG/BORE MTL/INTRO W/TORQ/DEV

## (undated) DEVICE — GUIDE CATHETER: Brand: MACH1™

## (undated) DEVICE — INTRO SHEATH PRELUDE EASE HC .025 6F 11CM

## (undated) DEVICE — PAD GRND REM POLYHESIVE A/ DISP

## (undated) DEVICE — MODEL BT2000 P/N 700287-012KIT CONTENTS: MANIFOLD WITH SALINE AND CONTRAST PORTS, SALINE TUBING WITH SPIKE AND HAND SYRINGE, TRANSDUCER: Brand: BT2000 AUTOMATED MANIFOLD KIT

## (undated) DEVICE — DEV COMP RAD PRELUDESYNC 24CM

## (undated) DEVICE — FRCP BX RADJAW4 NDL 2.8 240 STD OG

## (undated) DEVICE — SYR LUER SLPTP 50ML

## (undated) DEVICE — SNAR POLYP SENSATION STDOVL 27 240 BX40